# Patient Record
Sex: MALE | Race: WHITE | NOT HISPANIC OR LATINO | Employment: OTHER | URBAN - METROPOLITAN AREA
[De-identification: names, ages, dates, MRNs, and addresses within clinical notes are randomized per-mention and may not be internally consistent; named-entity substitution may affect disease eponyms.]

---

## 2017-01-18 ENCOUNTER — OFFICE VISIT (OUTPATIENT)
Dept: NEUROLOGY | Facility: CLINIC | Age: 42
End: 2017-01-18
Payer: MEDICARE

## 2017-01-18 VITALS
WEIGHT: 234.56 LBS | HEART RATE: 97 BPM | HEIGHT: 72 IN | DIASTOLIC BLOOD PRESSURE: 82 MMHG | SYSTOLIC BLOOD PRESSURE: 142 MMHG | BODY MASS INDEX: 31.77 KG/M2

## 2017-01-18 DIAGNOSIS — F95.2 TOURETTE'S SYNDROME: ICD-10-CM

## 2017-01-18 DIAGNOSIS — F42.2 MIXED OBSESSIONAL THOUGHTS AND ACTS: ICD-10-CM

## 2017-01-18 PROCEDURE — 99213 OFFICE O/P EST LOW 20 MIN: CPT | Mod: PBBFAC | Performed by: PSYCHIATRY & NEUROLOGY

## 2017-01-18 PROCEDURE — 99999 PR PBB SHADOW E&M-EST. PATIENT-LVL III: CPT | Mod: PBBFAC,,, | Performed by: PSYCHIATRY & NEUROLOGY

## 2017-01-18 PROCEDURE — 99204 OFFICE O/P NEW MOD 45 MIN: CPT | Mod: S$GLB,,, | Performed by: PSYCHIATRY & NEUROLOGY

## 2017-01-18 RX ORDER — CHLORDIAZEPOXIDE HYDROCHLORIDE 10 MG/1
10 CAPSULE, GELATIN COATED ORAL 3 TIMES DAILY PRN
Qty: 90 CAPSULE | Refills: 3 | Status: SHIPPED | OUTPATIENT
Start: 2017-01-18 | End: 2017-01-24 | Stop reason: SDUPTHER

## 2017-01-18 RX ORDER — BUPRENORPHINE HYDROCHLORIDE AND NALOXONE HYDROCHLORIDE DIHYDRATE 2; .5 MG/1; MG/1
8 TABLET SUBLINGUAL EVERY 6 HOURS PRN
COMMUNITY

## 2017-01-18 RX ORDER — LEVETIRACETAM 500 MG/1
500 TABLET ORAL 2 TIMES DAILY
COMMUNITY

## 2017-01-18 RX ORDER — TESTOSTERONE CYPIONATE 1000 MG/10ML
200 INJECTION, SOLUTION INTRAMUSCULAR
COMMUNITY

## 2017-01-18 RX ORDER — CLONAZEPAM 1 MG/1
1 TABLET ORAL 2 TIMES DAILY PRN
COMMUNITY

## 2017-01-18 RX ORDER — CLOMIPHENE CITRATE 50 MG/1
50 TABLET ORAL DAILY
COMMUNITY

## 2017-01-18 NOTE — PROGRESS NOTES
Name: Carol Ladd Jr.  MRN: [id number]   CSN: [id number]      Date: 01/18/2017    Referring physician:  Self Referral  No address on file    Chief Complaint / Interval History: Consult      History of Present Illness (HPI):    40 yo male presents for evaluation of tics. Accompanied today by his mother. Onset at 11 yoa, got really bad in 6th grade to the point of cussing. He has OCD and addiction problems. Drinks every morning. He says he found it  EtOH helped tics when he was in his 20s. Went to behavioral modification in 8th grade and was there for three years. Developed program to help his insert more appropriate words or cuss words. He no longer cusses but still has vocal tics. Unfortunately, tics seem to have continued to increase. Started experiencing drugs (marijuana, steroids briefly, pain pills, cocaine occasionally, opiates and alcohol).     He will beat himself from tics. If excited- good or bad, he will have increase tics. His whole body tenses when he has tic. One of his most common tic is when he slams legs (scissor fashion) together and beating punching his upper legs and grabs lower leg muscles and will whole body strain. States it is like he wants to feel pain. He has to feel the certain feeling before he can stop it and then will feel the release but repeats it.     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 Am I ever going to get another girlfriend? Am I ever going to stop this? Am I gong to die?   Is my mom going to die?     Nonmotor/Premotor ROS:  Hyposmia (HENT)?{YES NO:67889}  RBD/sleep issues (Constitutional)?{YES NO:13054}  Depression/anxiety (Psychiatric)?{YES NO:27000}  Fatigue (Constitutional)?{YES NO:11804}  Constipation (GI)?{YES NO:88955}  Urinary issues ()?{YES NO:06853}  Sexual dysfunction ()?{YES NO:24491}  Orthostasis (Cardiovascular)?{YES NO:64296}  Leg swelling (Cardiovascular)? {YES NO:02253}  Falls (Musculoskeletal)?{YES NO:92596}  Cognitive impairment (Neurologic)?{YES  NO:90151}  Psychoses (Psychiatric)?{YES NO:38435}  Pain/Paresthesia (Neurologic)?{YES NO:30749}  Visual changes (Eyes)?{YES NO:03834}  Moles / skin changes (Skin)?{YES NO:38168}  Stridor / SOB (Pulm)?{YES NO:95266}  Bruising (Heme)?{YES NO:18216}    Past Medical History: The patient  has a past medical history of Tourette syndrome.    Social History: The patient  reports that he has never smoked. He does not have any smokeless tobacco history on file.    Family History: Their family history is not on file.    Allergies: Review of patient's allergies indicates no known allergies.     Meds:   Current Outpatient Prescriptions on File Prior to Visit   Medication Sig Dispense Refill    dextroamphetamine-amphetamine 30 mg Tab Take by mouth.      guanfacine (TENEX) 2 MG tablet Take 2 mg by mouth every evening.      [DISCONTINUED] fluoxetine (PROZAC) 40 MG capsule Take 40 mg by mouth once daily.       No current facility-administered medications on file prior to visit.        Exam:  Visit Vitals    BP (!) 142/82    Pulse 97    Ht 6' (1.829 m)    Wt 106.4 kg (234 lb 9.1 oz)    BMI 31.81 kg/m2       Constitutional  Well-developed, well-nourished, appears stated age   Ophthalmoscopic  No papilledema with no hemorrhages or exudates bilaterally   Cardiovascular  Radial pulses 2+ and symmetric, no LE edema bilaterally   Neurological    * Mental status  MOCA =      - Orientation  Oriented to person, place, time, and situation     - Memory   Intact recent and remote     - Attention/concentration  Attentive, vigilant during exam     - Language  Naming & repetition intact, +2-step commands     - Fund of knowledge  Aware of current events     - Executive  Well-organized thoughts     - Other     * Cranial nerves       - CN II  PERRL, visual fields full to confrontation     - CN III, IV, VI  Extraocular movements full, normal pursuits and saccades     - CN V  Sensation V1 - V3 intact     - CN VII  Face strong and symmetric  bilaterally     - CN VIII  Hearing intact bilaterally     - CN IX, X  Palate raises midline and symmetric     - CN XI  SCM and trapezius 5/5 bilaterally     - CN XII  Tongue midline   * Motor  Muscle bulk normal, strength 5/5 throughout   * Sensory   Intact to temperature and vibration throughout   * Coordination  No dysmetria with finger-to-nose or heel-to-shin   * Gait  See below.   * Deep tendon reflexes  2+ and symmetric throughout   Babinski downgoing bilaterally   * Specialized movement exam  No hypophonic speech.    No facial masking.   No cogwheel rigidity.     No bradykinesia.   No tremor with rest, posture, kinesis, or intention.    No other dystonia, chorea, athetosis, myoclonus, or tics.   No motor impersistence.   Normal-based gait.   No shortened stride length.   No abnormal arm swing.     No postural instability.      Laboratory/Radiological:  - Results:No results found for any previous visit.    - Independent review of images:    Diagnoses:          No diagnosis found.    Medical Decision Making:    No Follow-up on file.    I spent *** minutes face-to-face with the patient with >50% of the time spent with counseling and education regarding:  - results of data, diagnosis, and recommendations stated above  - the prognosis of a ***  - risks and benefits of ***  - importance of diet and exercise    Charley Bull, FLOYD, NP-C  Division of Movement and Memory Disorders  [facility]  [phone]

## 2017-01-18 NOTE — MR AVS SNAPSHOT
[facility]  [address]  [facility] [address]  Phone: [phone]  Fax: [phone]                  Carol Ladd Jr.   2017 3:40 PM   Office Visit    Description:  Male : 1975   Provider:  Tarah Hawley MD   Department:  [facility]           Reason for Visit     Consult                To Do List           Goals (5 Years of Data)     None       These Medications        Disp Refills Start End    chlordiazepoxide (LIBRIUM) 10 MG capsule 90 capsule 3 2017     Take 1 capsule (10 mg total) by mouth 3 (three) times daily as needed (tics). - Oral    Pharmacy: [facility] [address] [place] [address]  #: [phone]         [facility] On Call     [facility] On Call Nurse Care Line -  Assistance  Registered nurses in the [facility] On Call Center provide clinical advisement, health education, appointment booking, and other advisory services.  Call for this free service at [phone].             Medications           Message regarding Medications     Verify the changes and/or additions to your medication regime listed below are the same as discussed with your clinician today.  If any of these changes or additions are incorrect, please notify your healthcare provider.        START taking these NEW medications        Refills    chlordiazepoxide (LIBRIUM) 10 MG capsule 3    Sig: Take 1 capsule (10 mg total) by mouth 3 (three) times daily as needed (tics).    Class: Print    Route: Oral      STOP taking these medications     fluoxetine (PROZAC) 40 MG capsule Take 40 mg by mouth once daily.           Verify that the below list of medications is an accurate representation of the medications you are currently taking.  If none reported, the list may be blank. If incorrect, please contact your healthcare provider. Carry this list with you in case of emergency.           Current Medications     buprenorphine-naloxone 2-0.5 mg (SUBOXONE) 2-0.5 mg Subl  Place 8 mg under the tongue every 6 (six) hours as needed.    clomiPHENE (CLOMID) 50 mg tablet Take 50 mg by mouth once daily.    clonazePAM (KLONOPIN) 1 MG tablet Take 1 mg by mouth 2 (two) times daily as needed for Anxiety.    dextroamphetamine-amphetamine 30 mg Tab Take by mouth.    guanfacine (TENEX) 2 MG tablet Take 2 mg by mouth every evening.    levetiracetam (KEPPRA) 500 MG Tab Take 500 mg by mouth 2 (two) times daily.    testosterone cypionate (DEPOTESTOTERONE CYPIONATE) 100 mg/mL injection Inject 200 mg into the muscle every 14 (fourteen) days.    chlordiazepoxide (LIBRIUM) 10 MG capsule Take 1 capsule (10 mg total) by mouth 3 (three) times daily as needed (tics).           Clinical Reference Information           Vital Signs - Last Recorded  Most recent update: 1/18/2017  4:42 PM by Mary Butt MA    BP Pulse Ht Wt BMI    (!) 142/82 97 6' (1.829 m) 106.4 kg (234 lb 9.1 oz) 31.81 kg/m2      Blood Pressure          Most Recent Value    BP  (!)  142/82      Allergies as of 1/18/2017     No Known Allergies      Immunizations Administered on Date of Encounter - 1/18/2017     None      [facility] Sign-Up     Activating your [facility] account is as easy as 1-2-3!     1) Visit [email or web address], select Sign Up Now, enter this activation code and your date of birth, then select Next.  [email or web address]  Expires: 3/4/2017  3:33 PM      2) Create a username and password to use when you visit [facility] in the future and select a security question in case you lose your password and select Next.    3) Enter your e-mail address and click Sign Up!    Additional Information  If you have questions, please e-mail [email or web address] or call [phone] to talk to our [facility] staff. Remember, [facility] is NOT to be used for urgent needs. For medical emergencies, dial 911.

## 2017-01-18 NOTE — PROGRESS NOTES
Carol CLEMENTE Chief Complaints during this visit:  New Patient visit for  tourette's    Self Referral  No address on file    Primary Care Physician  Raymundo Cabrera MD  [address]  [facility] [place] [address]          History of present illness:   40 yo male presents for evaluation of tics. Accompanied today by his mother. Main reason why he sought us out was for possible DBS.    Onset at 11 yoa, got really bad in 6th grade to the point of cussing. He has OCD and addiction problems. Drinks every morning. He says he found it  EtOH helped tics when he was in his 20s. Went to behavioral modification in 8th grade and was there for three years. Developed program to help his insert more appropriate words or cuss words. He no longer cusses but still has vocal tics. Unfortunately, tics seem to have continued to increase. Started experiencing drugs (marijuana, steroids briefly, pain pills, cocaine occasionally, opiates and alcohol).     His tics wax/wane and he has gone months doing well.  H recently broke up with girlfriend and this may have retriggered a flare.  He will beat himself from tics. If excited- good or bad, he will have increase tics. His whole body tenses when he has tic. One of his most common tic is when he slams legs (scissor fashion) together and beating punching his upper legs and grabs lower leg muscles and will whole body strain. States it is like he wants to feel pain. He has to feel the certain feeling before he can stop it and then will feel the release but repeats it.     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 Am I ever going to get another girlfriend? Am I ever going to stop this? Am I gong to die?   Is my mom going to die?     Off all the meds he has tried, etoh is only thing that works.  2 shots of whiskey will calm him down.  Klonopin had some effect, but seems to acclimates.    OCD is severe, washes hands 100 times per day.  Perseverating thoughts.    Lives with mother now, since  "recent break-up.    Was admitted at [facility] in [place] for detox.  During that week stay, he had NO tics.  He had severe OCD, but no tics.  He think it was because of the "detox" pill.    On a program for the suboxone.  Dr. Emi Brown ([place])  Psychiatrist:  Oh doe MD ([facility])      II.  Review of systems  As in HPI,  otherwise, balance 10 systems reviewed and are negative.    III.  Past Medical History   Diagnosis Date    Tourette syndrome      Family History   Problem Relation Age of Onset    OCD Mother     Tics Father     OCD Sister     Tics Paternal Uncle     Tics Cousin     OCD Daughter      Social History     Social History    Marital status: Single     Spouse name: N/A    Number of children: N/A    Years of education: N/A     Social History Main Topics    Smoking status: Never Smoker    Smokeless tobacco: None    Alcohol use None    Drug use: None    Sexual activity: Not Asked     Other Topics Concern    None     Social History Narrative         Current Outpatient Prescriptions on File Prior to Visit   Medication Sig Dispense Refill    dextroamphetamine-amphetamine 30 mg Tab Take by mouth.      guanfacine (TENEX) 2 MG tablet Take 2 mg by mouth every evening.      [DISCONTINUED] fluoxetine (PROZAC) 40 MG capsule Take 40 mg by mouth once daily.       No current facility-administered medications on file prior to visit.        PRIOR problem-specific medications tried:  Haldol (fever/chills); prozac; orap; celexa; pristique; lexapro; effexor; adderall; klonopin; valium; risperdal; ativan;     Review of patient's allergies indicates:  No Known Allergies    IV. Physical Exam    Vitals:    01/18/17 1641   BP: (!) 142/82   Pulse: 97   Weight: 106.4 kg (234 lb 9.1 oz)   Height: 6' (1.829 m)     General appearance: Well nourished, well developed, no acute distress.         Cardiovascular:  pedal pulses 2, no edema or cyanosis, heart regular rate and rhythym, no carotid bruits. "         -------------------------------------------------------------  Facial Expression: normal       Affect: full       Orientation to time & place:  Oriented to time, place, person and situation       Attention & concentration:  Normal attention span and concentration       Memory:  Recent and remote memory intact  Language: Spontaneous, fluent; able to repeat and name objects        Fund of knowledge:  Aware of current events        Speech:  normal (not dysarthric)  -------------------------------------------------------  Cranial nerves: normal visual acuity, visual fields full, optic discs not visualized, pupils equal round and reactive, extraocular movements intact,       facial sensation intact, face symmetrical, hearing intact to whisper, palate raises midline, shoulder shrug strength normal, tongue protrudes midline.        -------------------------------------------------------  Musculoskeletal  Muscle tone: all 4 extremities normal        Muscle Bulk: all 4 extremities normal        Muscle strength:  5/5 in all 4 extremities        --------------------------------------------------------------  Cerebellar and Coordination  Gait:  normal        Finger-nose: no dysmetria       Rapid Alternating Movements (pronation/supination):  R normal; L normal  --------------------------------------------------------------  MOVEMENT DISORDERS FOCUSED EXAM  Abnormality of movement (bradykinesia, hyperkinesia) present? Yes, intermittent large amplitude movements of limbs, persisting and generalized hypertonicity throughout exam.    Tremor present?   No   Posture:  normal  Postural stability:  no Rhomberg    V.  Laboratory/ Radiological Data:   No results found for: ALT, AST, GGT, ALKPHOS, BILITOT   No results found for: TSH            VI. Medical Decision Making  Diagnosis:   Tourette's syndrome                   Assessment:    1.  tourettes with malignant tics        2.  DBS candidate       3.        4.            Treatment plan:  1.  Trial of librium          2.  Obtain outside records from [place]           3.  Did not discuss DBS today fully, but will at next visit.           4.             Tests ordered during this visit:   No orders of the defined types were placed in this encounter.              I appreciate the opportunity to participate in the care of this patient and will communicate my assessment and plan back to the referring physician via copy of this note.                             Return in about 3 months (around 4/18/2017), or TS.

## 2017-01-23 ENCOUNTER — PATIENT MESSAGE (OUTPATIENT)
Dept: NEUROLOGY | Facility: CLINIC | Age: 42
End: 2017-01-23

## 2017-01-24 ENCOUNTER — PATIENT MESSAGE (OUTPATIENT)
Dept: NEUROLOGY | Facility: CLINIC | Age: 42
End: 2017-01-24

## 2017-01-24 PROBLEM — F95.2 TOURETTE'S SYNDROME: Status: ACTIVE | Noted: 2017-01-24

## 2017-01-24 PROBLEM — F42.2 MIXED OBSESSIONAL THOUGHTS AND ACTS: Status: ACTIVE | Noted: 2017-01-24

## 2017-01-24 RX ORDER — CHLORDIAZEPOXIDE HYDROCHLORIDE 10 MG/1
20 CAPSULE, GELATIN COATED ORAL 3 TIMES DAILY PRN
Qty: 90 CAPSULE | Refills: 3
Start: 2017-01-24

## 2017-01-26 ENCOUNTER — PATIENT MESSAGE (OUTPATIENT)
Dept: NEUROLOGY | Facility: CLINIC | Age: 42
End: 2017-01-26

## 2017-01-27 ENCOUNTER — PATIENT MESSAGE (OUTPATIENT)
Dept: NEUROLOGY | Facility: CLINIC | Age: 42
End: 2017-01-27

## 2017-01-28 ENCOUNTER — PATIENT MESSAGE (OUTPATIENT)
Dept: NEUROLOGY | Facility: CLINIC | Age: 42
End: 2017-01-28

## 2017-01-30 ENCOUNTER — PATIENT MESSAGE (OUTPATIENT)
Dept: NEUROLOGY | Facility: CLINIC | Age: 42
End: 2017-01-30

## 2017-01-31 NOTE — TELEPHONE ENCOUNTER
i'm unable to access [facility] now that the encounter is closed, but will fax them a request.    [facility]. THAT FAX NUMBER IS [phone] ATTENTION: MRS CRANE

## 2017-02-01 ENCOUNTER — PATIENT MESSAGE (OUTPATIENT)
Dept: NEUROLOGY | Facility: CLINIC | Age: 42
End: 2017-02-01

## 2017-02-02 ENCOUNTER — PATIENT MESSAGE (OUTPATIENT)
Dept: NEUROLOGY | Facility: CLINIC | Age: 42
End: 2017-02-02

## 2017-02-02 ENCOUNTER — TELEPHONE (OUTPATIENT)
Dept: NEUROLOGY | Facility: CLINIC | Age: 42
End: 2017-02-02

## 2017-02-02 NOTE — TELEPHONE ENCOUNTER
Called yesterday and today and left a message for Julee letting him know that we did get his records from [facility].

## 2017-02-08 ENCOUNTER — PATIENT MESSAGE (OUTPATIENT)
Dept: NEUROLOGY | Facility: CLINIC | Age: 42
End: 2017-02-08

## 2017-02-10 ENCOUNTER — PATIENT MESSAGE (OUTPATIENT)
Dept: NEUROLOGY | Facility: CLINIC | Age: 42
End: 2017-02-10

## 2017-02-10 DIAGNOSIS — F95.2 TOURETTE'S SYNDROME: Primary | ICD-10-CM

## 2017-02-13 ENCOUNTER — PATIENT MESSAGE (OUTPATIENT)
Dept: NEUROLOGY | Facility: CLINIC | Age: 42
End: 2017-02-13

## 2017-02-13 RX ORDER — CLORAZEPATE DIPOTASSIUM 15 MG/1
15 TABLET ORAL 3 TIMES DAILY
Qty: 90 TABLET | Refills: 4 | Status: SHIPPED | OUTPATIENT
Start: 2017-02-13 | End: 2017-02-16 | Stop reason: SDUPTHER

## 2017-02-16 ENCOUNTER — PATIENT MESSAGE (OUTPATIENT)
Dept: NEUROLOGY | Facility: CLINIC | Age: 42
End: 2017-02-16

## 2017-02-16 DIAGNOSIS — F95.2 TOURETTE'S SYNDROME: ICD-10-CM

## 2017-02-17 RX ORDER — CLORAZEPATE DIPOTASSIUM 15 MG/1
15 TABLET ORAL 3 TIMES DAILY
Qty: 90 TABLET | Refills: 4 | Status: SHIPPED | OUTPATIENT
Start: 2017-02-17 | End: 2023-03-21

## 2017-02-18 ENCOUNTER — PATIENT MESSAGE (OUTPATIENT)
Dept: NEUROLOGY | Facility: CLINIC | Age: 42
End: 2017-02-18

## 2017-02-20 ENCOUNTER — PATIENT MESSAGE (OUTPATIENT)
Dept: NEUROLOGY | Facility: CLINIC | Age: 42
End: 2017-02-20

## 2017-02-26 ENCOUNTER — PATIENT MESSAGE (OUTPATIENT)
Dept: NEUROLOGY | Facility: CLINIC | Age: 42
End: 2017-02-26

## 2017-02-27 ENCOUNTER — TELEPHONE (OUTPATIENT)
Dept: NEUROLOGY | Facility: CLINIC | Age: 42
End: 2017-02-27

## 2017-02-27 NOTE — TELEPHONE ENCOUNTER
----- Message from Nicole Eli sent at 2/27/2017  8:33 AM CST -----  Contact: Vaibhav Bonilla-   Need to speak with someone regarding his Dx. She need the actual letter, regarding is Dx. She states she need the letter mailed to her or attached in a letter via e-mail.     She states she may take a full letter with an attachment, with physician signature. Or you may mail it she states.     E-mail: [email or web address]  Phone: [phone] [address] [place]  [address]

## 2017-04-08 ENCOUNTER — PATIENT MESSAGE (OUTPATIENT)
Dept: NEUROLOGY | Facility: CLINIC | Age: 42
End: 2017-04-08

## 2017-04-14 ENCOUNTER — PATIENT MESSAGE (OUTPATIENT)
Dept: NEUROLOGY | Facility: CLINIC | Age: 42
End: 2017-04-14

## 2017-04-18 ENCOUNTER — PATIENT MESSAGE (OUTPATIENT)
Dept: NEUROLOGY | Facility: CLINIC | Age: 42
End: 2017-04-18

## 2020-08-26 ENCOUNTER — TELEPHONE (OUTPATIENT)
Dept: NEUROLOGY | Facility: CLINIC | Age: 45
End: 2020-08-26

## 2020-08-26 NOTE — TELEPHONE ENCOUNTER
----- Message from Efra Amaral sent at 8/26/2020 12:04 PM CDT -----  Contact: Pt @[phone]  Patient calling to schedule a f/u appt, pls call

## 2020-08-31 ENCOUNTER — TELEPHONE (OUTPATIENT)
Dept: NEUROLOGY | Facility: CLINIC | Age: 45
End: 2020-08-31

## 2020-08-31 NOTE — TELEPHONE ENCOUNTER
Called and left a message for  regarding an appt with . I stated that  is out of the office ill and her return is unknown

## 2020-08-31 NOTE — TELEPHONE ENCOUNTER
----- Message from Efra Amaral sent at 8/31/2020 10:55 AM CDT -----  Contact: Pt @[phone]  Patient calling to discuss his options to f/u care ,mandie call

## 2020-08-31 NOTE — TELEPHONE ENCOUNTER
----- Message from Edith Fuentes MA sent at 8/28/2020  4:03 PM CDT -----  Contact: Pt @[phone]    ----- Message -----  From: Efra Amaral  Sent: 8/28/2020  11:05 AM CDT  To: Marleen COLLADO Staff    Patient calling to schedule a f/u appt at the [facility], [facility] call

## 2020-08-31 NOTE — TELEPHONE ENCOUNTER
----- Message from Bebeto Jones sent at 8/31/2020 11:42 AM CDT -----  Contact: pt @ [phone] and portal  Pt calling to schedule w/ movement for tourettes. Last seen by Dr. Hawley in 2017. Pls call and send mssg on portal.

## 2023-03-21 ENCOUNTER — HOSPITAL ENCOUNTER (INPATIENT)
Facility: HOSPITAL | Age: 48
LOS: 20 days | Discharge: HOME | DRG: 896 | End: 2023-04-10
Attending: EMERGENCY MEDICINE | Admitting: INTERNAL MEDICINE
Payer: MEDICARE

## 2023-03-21 DIAGNOSIS — F95.2 TOURETTE SYNDROME: ICD-10-CM

## 2023-03-21 DIAGNOSIS — I21.4 NSTEMI (NON-ST ELEVATED MYOCARDIAL INFARCTION): ICD-10-CM

## 2023-03-21 DIAGNOSIS — G92.9 ENCEPHALOPATHY, TOXIC: ICD-10-CM

## 2023-03-21 DIAGNOSIS — N17.9 ACUTE KIDNEY INJURY: ICD-10-CM

## 2023-03-21 DIAGNOSIS — R79.89 ELEVATED TROPONIN: ICD-10-CM

## 2023-03-21 DIAGNOSIS — R79.89 ELEVATED TROPONIN I LEVEL: ICD-10-CM

## 2023-03-21 DIAGNOSIS — F10.939 ALCOHOL WITHDRAWAL SYNDROME WITH COMPLICATION: Primary | ICD-10-CM

## 2023-03-21 PROBLEM — E87.20 METABOLIC ACIDOSIS: Status: ACTIVE | Noted: 2023-03-21

## 2023-03-21 PROBLEM — M62.82 RHABDOMYOLYSIS: Status: ACTIVE | Noted: 2023-03-21

## 2023-03-21 PROBLEM — R41.82 AMS (ALTERED MENTAL STATUS): Status: ACTIVE | Noted: 2023-03-21

## 2023-03-21 PROBLEM — I10 HTN (HYPERTENSION): Status: ACTIVE | Noted: 2023-03-21

## 2023-03-21 PROBLEM — F32.A DEPRESSION: Status: ACTIVE | Noted: 2023-03-21

## 2023-03-21 LAB
ALBUMIN SERPL BCP-MCNC: 3.9 G/DL (ref 3.5–5)
ALBUMIN/GLOB SERPL: 1.3 {RATIO}
ALP SERPL-CCNC: 60 U/L (ref 45–115)
ALT SERPL W P-5'-P-CCNC: 68 U/L (ref 16–61)
AMMONIA PLAS-SCNC: 29 ΜMOL/L (ref 11–32)
ANION GAP SERPL CALCULATED.3IONS-SCNC: 30 MMOL/L (ref 7–16)
ANISOCYTOSIS BLD QL SMEAR: NORMAL
APTT PPP: 26.3 SECONDS (ref 25.2–37.3)
AST SERPL W P-5'-P-CCNC: 109 U/L (ref 15–37)
BACTERIA #/AREA URNS HPF: ABNORMAL /HPF
BASOPHILS # BLD AUTO: 0.04 K/UL (ref 0–0.2)
BASOPHILS # BLD AUTO: 0.09 K/UL (ref 0–0.2)
BASOPHILS NFR BLD AUTO: 0.4 % (ref 0–1)
BASOPHILS NFR BLD AUTO: 0.6 % (ref 0–1)
BILIRUB SERPL-MCNC: 0.8 MG/DL (ref ?–1.2)
BILIRUB UR QL STRIP: NEGATIVE
BUN SERPL-MCNC: 38 MG/DL (ref 7–18)
BUN/CREAT SERPL: 9 (ref 6–20)
CALCIUM SERPL-MCNC: 8.8 MG/DL (ref 8.5–10.1)
CHLORIDE SERPL-SCNC: 98 MMOL/L (ref 98–107)
CK SERPL-CCNC: 3333 U/L (ref 39–308)
CLARITY UR: CLEAR
CO2 SERPL-SCNC: 18 MMOL/L (ref 21–32)
COLOR UR: YELLOW
CREAT SERPL-MCNC: 4.37 MG/DL (ref 0.7–1.3)
DIFFERENTIAL METHOD BLD: ABNORMAL
DIFFERENTIAL METHOD BLD: ABNORMAL
EGFR (NO RACE VARIABLE) (RUSH/TITUS): 16 ML/MIN/1.73M²
EOSINOPHIL # BLD AUTO: 0.03 K/UL (ref 0–0.5)
EOSINOPHIL # BLD AUTO: 0.1 K/UL (ref 0–0.5)
EOSINOPHIL NFR BLD AUTO: 0.3 % (ref 1–4)
EOSINOPHIL NFR BLD AUTO: 0.6 % (ref 1–4)
ERYTHROCYTE [DISTWIDTH] IN BLOOD BY AUTOMATED COUNT: 15 % (ref 11.5–14.5)
ERYTHROCYTE [DISTWIDTH] IN BLOOD BY AUTOMATED COUNT: 15 % (ref 11.5–14.5)
FINE GRAN CASTS #/AREA URNS LPF: ABNORMAL /LPF
GLOBULIN SER-MCNC: 3.1 G/DL (ref 2–4)
GLUCOSE SERPL-MCNC: 137 MG/DL (ref 74–106)
GLUCOSE UR STRIP-MCNC: 300 MG/DL
HCO3 UR-SCNC: 22.1 MMOL/L (ref 21–28)
HCT VFR BLD AUTO: 29.9 % (ref 40–54)
HCT VFR BLD AUTO: 36.6 % (ref 40–54)
HCT VFR BLD CALC: 33 % (ref 35–51)
HGB BLD-MCNC: 10.2 G/DL (ref 13.5–18)
HGB BLD-MCNC: 12.1 G/DL (ref 13.5–18)
IMM GRANULOCYTES # BLD AUTO: 0.05 K/UL (ref 0–0.04)
IMM GRANULOCYTES # BLD AUTO: 0.09 K/UL (ref 0–0.04)
IMM GRANULOCYTES NFR BLD: 0.5 % (ref 0–0.4)
IMM GRANULOCYTES NFR BLD: 0.6 % (ref 0–0.4)
INR BLD: 1.09
KETONES UR STRIP-SCNC: ABNORMAL MG/DL
LACTATE SERPL-SCNC: 0.8 MMOL/L (ref 0.4–2)
LACTATE SERPL-SCNC: 7.8 MMOL/L (ref 0.4–2)
LDH SERPL L TO P-CCNC: 10 MMOL/L (ref 0.3–1.2)
LEUKOCYTE ESTERASE UR QL STRIP: NEGATIVE
LYMPHOCYTES # BLD AUTO: 0.98 K/UL (ref 1–4.8)
LYMPHOCYTES # BLD AUTO: 2.29 K/UL (ref 1–4.8)
LYMPHOCYTES NFR BLD AUTO: 14.3 % (ref 27–41)
LYMPHOCYTES NFR BLD AUTO: 9.6 % (ref 27–41)
MACROCYTES BLD QL SMEAR: NORMAL
MAGNESIUM SERPL-MCNC: 1.3 MG/DL (ref 1.7–2.3)
MCH RBC QN AUTO: 35.3 PG (ref 27–31)
MCH RBC QN AUTO: 35.3 PG (ref 27–31)
MCHC RBC AUTO-ENTMCNC: 33.1 G/DL (ref 32–36)
MCHC RBC AUTO-ENTMCNC: 34.1 G/DL (ref 32–36)
MCV RBC AUTO: 103.5 FL (ref 80–96)
MCV RBC AUTO: 106.7 FL (ref 80–96)
MONOCYTES # BLD AUTO: 0.68 K/UL (ref 0–0.8)
MONOCYTES # BLD AUTO: 1.04 K/UL (ref 0–0.8)
MONOCYTES NFR BLD AUTO: 6.5 % (ref 2–6)
MONOCYTES NFR BLD AUTO: 6.6 % (ref 2–6)
MPC BLD CALC-MCNC: 9.6 FL (ref 9.4–12.4)
MPC BLD CALC-MCNC: 9.7 FL (ref 9.4–12.4)
MYOGLOBIN SERPL-MCNC: 6355 NG/ML (ref 16–116)
NEUTROPHILS # BLD AUTO: 12.45 K/UL (ref 1.8–7.7)
NEUTROPHILS # BLD AUTO: 8.47 K/UL (ref 1.8–7.7)
NEUTROPHILS NFR BLD AUTO: 77.4 % (ref 53–65)
NEUTROPHILS NFR BLD AUTO: 82.6 % (ref 53–65)
NITRITE UR QL STRIP: NEGATIVE
NRBC # BLD AUTO: 0 X10E3/UL
NRBC # BLD AUTO: 0.02 X10E3/UL
NRBC, AUTO (.00): 0 %
NRBC, AUTO (.00): 0.1 %
PAPPENHEIMER BOD BLD QL SMEAR: NORMAL
PCO2 BLDA: 47 MMHG (ref 35–48)
PH SMN: 7.28 [PH] (ref 7.35–7.45)
PH UR STRIP: 5.5 PH UNITS
PLATELET # BLD AUTO: 140 K/UL (ref 150–400)
PLATELET # BLD AUTO: 97 K/UL (ref 150–400)
PLATELET MORPHOLOGY: NORMAL
PO2 BLDA: 136 MMHG (ref 83–108)
POC BASE EXCESS: -4.6 MMOL/L (ref -2–3)
POC CO2: 23.5 MMOL/L
POC IONIZED CALCIUM: 0.97 MMOL/L (ref 1.15–1.35)
POC SATURATED O2: 99 % (ref 95–98)
POCT GLUCOSE: 106 MG/DL (ref 60–95)
POLYCHROMASIA BLD QL SMEAR: NORMAL
POTASSIUM BLD-SCNC: 3.8 MMOL/L (ref 3.4–4.5)
POTASSIUM SERPL-SCNC: 3.9 MMOL/L (ref 3.5–5.1)
PROT SERPL-MCNC: 7 G/DL (ref 6.4–8.2)
PROT UR QL STRIP: 70
PROTHROMBIN TIME: 13.7 SECONDS (ref 11.7–14.7)
RBC # BLD AUTO: 2.89 M/UL (ref 4.6–6.2)
RBC # BLD AUTO: 3.43 M/UL (ref 4.6–6.2)
RBC # UR STRIP: ABNORMAL /UL
RBC #/AREA URNS HPF: ABNORMAL /HPF
SARS-COV-2 RDRP RESP QL NAA+PROBE: NEGATIVE
SODIUM BLD-SCNC: 137 MMOL/L (ref 136–145)
SODIUM SERPL-SCNC: 142 MMOL/L (ref 136–145)
SP GR UR STRIP: 1.02
SQUAMOUS #/AREA URNS LPF: ABNORMAL /LPF
TROPONIN I SERPL HS-MCNC: 573.5 PG/ML
TROPONIN I SERPL HS-MCNC: 808.8 PG/ML
UROBILINOGEN UR STRIP-ACNC: NORMAL MG/DL
WBC # BLD AUTO: 10.25 K/UL (ref 4.5–11)
WBC # BLD AUTO: 16.06 K/UL (ref 4.5–11)
WBC #/AREA URNS HPF: ABNORMAL /HPF
YEAST #/AREA URNS HPF: ABNORMAL /HPF

## 2023-03-21 PROCEDURE — 99285 EMERGENCY DEPT VISIT HI MDM: CPT | Mod: 25

## 2023-03-21 PROCEDURE — 80061 LIPID PANEL: CPT | Performed by: HOSPITALIST

## 2023-03-21 PROCEDURE — 27000190 HC CPAP FULL FACE MASK W/VALVE

## 2023-03-21 PROCEDURE — 84132 ASSAY OF SERUM POTASSIUM: CPT

## 2023-03-21 PROCEDURE — 27000221 HC OXYGEN, UP TO 24 HOURS

## 2023-03-21 PROCEDURE — 85610 PROTHROMBIN TIME: CPT | Performed by: HOSPITALIST

## 2023-03-21 PROCEDURE — 99223 1ST HOSP IP/OBS HIGH 75: CPT | Mod: ,,, | Performed by: NURSE PRACTITIONER

## 2023-03-21 PROCEDURE — 80074 ACUTE HEPATITIS PANEL: CPT | Performed by: HOSPITALIST

## 2023-03-21 PROCEDURE — 84484 ASSAY OF TROPONIN QUANT: CPT | Performed by: HOSPITALIST

## 2023-03-21 PROCEDURE — 99285 EMERGENCY DEPT VISIT HI MDM: CPT | Mod: CS,,, | Performed by: EMERGENCY MEDICINE

## 2023-03-21 PROCEDURE — 63600175 PHARM REV CODE 636 W HCPCS: Performed by: INTERNAL MEDICINE

## 2023-03-21 PROCEDURE — 20000000 HC ICU ROOM

## 2023-03-21 PROCEDURE — 83874 ASSAY OF MYOGLOBIN: CPT | Performed by: EMERGENCY MEDICINE

## 2023-03-21 PROCEDURE — 83605 ASSAY OF LACTIC ACID: CPT

## 2023-03-21 PROCEDURE — 82550 ASSAY OF CK (CPK): CPT | Performed by: EMERGENCY MEDICINE

## 2023-03-21 PROCEDURE — 96375 TX/PRO/DX INJ NEW DRUG ADDON: CPT

## 2023-03-21 PROCEDURE — 82803 BLOOD GASES ANY COMBINATION: CPT

## 2023-03-21 PROCEDURE — 83735 ASSAY OF MAGNESIUM: CPT | Performed by: HOSPITALIST

## 2023-03-21 PROCEDURE — 25000003 PHARM REV CODE 250: Performed by: NURSE PRACTITIONER

## 2023-03-21 PROCEDURE — 85730 THROMBOPLASTIN TIME PARTIAL: CPT | Performed by: HOSPITALIST

## 2023-03-21 PROCEDURE — 87635 SARS-COV-2 COVID-19 AMP PRB: CPT | Performed by: NURSE PRACTITIONER

## 2023-03-21 PROCEDURE — 94761 N-INVAS EAR/PLS OXIMETRY MLT: CPT

## 2023-03-21 PROCEDURE — 96376 TX/PRO/DX INJ SAME DRUG ADON: CPT

## 2023-03-21 PROCEDURE — 81001 URINALYSIS AUTO W/SCOPE: CPT | Performed by: EMERGENCY MEDICINE

## 2023-03-21 PROCEDURE — 63600175 PHARM REV CODE 636 W HCPCS: Performed by: NURSE PRACTITIONER

## 2023-03-21 PROCEDURE — 96361 HYDRATE IV INFUSION ADD-ON: CPT

## 2023-03-21 PROCEDURE — 80053 COMPREHEN METABOLIC PANEL: CPT | Performed by: EMERGENCY MEDICINE

## 2023-03-21 PROCEDURE — 25000003 PHARM REV CODE 250: Performed by: HOSPITALIST

## 2023-03-21 PROCEDURE — 84295 ASSAY OF SERUM SODIUM: CPT

## 2023-03-21 PROCEDURE — 25000003 PHARM REV CODE 250: Performed by: INTERNAL MEDICINE

## 2023-03-21 PROCEDURE — 25000003 PHARM REV CODE 250

## 2023-03-21 PROCEDURE — 85025 COMPLETE CBC W/AUTO DIFF WBC: CPT | Performed by: HOSPITALIST

## 2023-03-21 PROCEDURE — 87040 BLOOD CULTURE FOR BACTERIA: CPT | Performed by: INTERNAL MEDICINE

## 2023-03-21 PROCEDURE — 85025 COMPLETE CBC W/AUTO DIFF WBC: CPT | Performed by: EMERGENCY MEDICINE

## 2023-03-21 PROCEDURE — 99285 PR EMERGENCY DEPT VISIT,LEVEL V: ICD-10-PCS | Mod: CS,,, | Performed by: EMERGENCY MEDICINE

## 2023-03-21 PROCEDURE — 82140 ASSAY OF AMMONIA: CPT | Performed by: HOSPITALIST

## 2023-03-21 PROCEDURE — 63600175 PHARM REV CODE 636 W HCPCS: Performed by: EMERGENCY MEDICINE

## 2023-03-21 PROCEDURE — 85014 HEMATOCRIT: CPT

## 2023-03-21 PROCEDURE — 82330 ASSAY OF CALCIUM: CPT

## 2023-03-21 PROCEDURE — 83605 ASSAY OF LACTIC ACID: CPT | Performed by: EMERGENCY MEDICINE

## 2023-03-21 PROCEDURE — 84484 ASSAY OF TROPONIN QUANT: CPT | Performed by: NURSE PRACTITIONER

## 2023-03-21 PROCEDURE — 82947 ASSAY GLUCOSE BLOOD QUANT: CPT

## 2023-03-21 PROCEDURE — 99900035 HC TECH TIME PER 15 MIN (STAT)

## 2023-03-21 PROCEDURE — 25000003 PHARM REV CODE 250: Performed by: EMERGENCY MEDICINE

## 2023-03-21 PROCEDURE — 99223 PR INITIAL HOSPITAL CARE,LEVL III: ICD-10-PCS | Mod: ,,, | Performed by: NURSE PRACTITIONER

## 2023-03-21 PROCEDURE — 82746 ASSAY OF FOLIC ACID SERUM: CPT | Performed by: HOSPITALIST

## 2023-03-21 PROCEDURE — 87086 URINE CULTURE/COLONY COUNT: CPT | Performed by: EMERGENCY MEDICINE

## 2023-03-21 PROCEDURE — 63600175 PHARM REV CODE 636 W HCPCS: Performed by: HOSPITALIST

## 2023-03-21 PROCEDURE — 94660 CPAP INITIATION&MGMT: CPT

## 2023-03-21 PROCEDURE — 96365 THER/PROPH/DIAG IV INF INIT: CPT

## 2023-03-21 RX ORDER — HYDROCHLOROTHIAZIDE 25 MG/1
25 TABLET ORAL DAILY
Status: ON HOLD | COMMUNITY
End: 2023-04-07 | Stop reason: HOSPADM

## 2023-03-21 RX ORDER — DOXEPIN HYDROCHLORIDE 25 MG/1
25 CAPSULE ORAL NIGHTLY
COMMUNITY

## 2023-03-21 RX ORDER — ASPIRIN 81 MG/1
81 TABLET ORAL DAILY
Status: DISCONTINUED | OUTPATIENT
Start: 2023-03-21 | End: 2023-04-10 | Stop reason: HOSPADM

## 2023-03-21 RX ORDER — NOREPINEPHRINE BITARTRATE/D5W 4MG/250ML
0-3 PLASTIC BAG, INJECTION (ML) INTRAVENOUS CONTINUOUS
Status: DISCONTINUED | OUTPATIENT
Start: 2023-03-21 | End: 2023-03-23

## 2023-03-21 RX ORDER — LORAZEPAM 2 MG/ML
2 INJECTION INTRAMUSCULAR
Status: DISCONTINUED | OUTPATIENT
Start: 2023-03-21 | End: 2023-04-01

## 2023-03-21 RX ORDER — MULTIVITAMIN
1 TABLET ORAL DAILY
COMMUNITY

## 2023-03-21 RX ORDER — LISINOPRIL 20 MG/1
20 TABLET ORAL DAILY
Status: ON HOLD | COMMUNITY
End: 2023-04-07 | Stop reason: HOSPADM

## 2023-03-21 RX ORDER — SODIUM CHLORIDE 9 MG/ML
INJECTION, SOLUTION INTRAVENOUS
Status: COMPLETED
Start: 2023-03-21 | End: 2023-03-21

## 2023-03-21 RX ORDER — MUPIROCIN 20 MG/G
OINTMENT TOPICAL 2 TIMES DAILY
Status: COMPLETED | OUTPATIENT
Start: 2023-03-21 | End: 2023-03-26

## 2023-03-21 RX ORDER — FLUOXETINE HYDROCHLORIDE 20 MG/1
20 CAPSULE ORAL DAILY
COMMUNITY

## 2023-03-21 RX ORDER — METOPROLOL TARTRATE 1 MG/ML
5 INJECTION, SOLUTION INTRAVENOUS
Status: COMPLETED | OUTPATIENT
Start: 2023-03-21 | End: 2023-03-21

## 2023-03-21 RX ORDER — LOSARTAN POTASSIUM 100 MG/1
100 TABLET ORAL DAILY
Status: ON HOLD | COMMUNITY
End: 2023-04-07 | Stop reason: HOSPADM

## 2023-03-21 RX ORDER — NOREPINEPHRINE BITARTRATE/D5W 4MG/250ML
0-3 PLASTIC BAG, INJECTION (ML) INTRAVENOUS CONTINUOUS
Status: DISCONTINUED | OUTPATIENT
Start: 2023-03-21 | End: 2023-03-21

## 2023-03-21 RX ORDER — PIMOZIDE 1 MG/1
1 TABLET ORAL 2 TIMES DAILY
COMMUNITY

## 2023-03-21 RX ORDER — OMEPRAZOLE 40 MG/1
40 CAPSULE, DELAYED RELEASE ORAL DAILY
COMMUNITY

## 2023-03-21 RX ORDER — HEPARIN SODIUM 5000 [USP'U]/ML
5000 INJECTION, SOLUTION INTRAVENOUS; SUBCUTANEOUS EVERY 8 HOURS
Status: DISCONTINUED | OUTPATIENT
Start: 2023-03-21 | End: 2023-03-21

## 2023-03-21 RX ORDER — DIPHENHYDRAMINE HYDROCHLORIDE 50 MG/ML
50 INJECTION INTRAMUSCULAR; INTRAVENOUS ONCE
Status: COMPLETED | OUTPATIENT
Start: 2023-03-22 | End: 2023-03-21

## 2023-03-21 RX ORDER — LANOLIN ALCOHOL/MO/W.PET/CERES
100 CREAM (GRAM) TOPICAL DAILY
COMMUNITY

## 2023-03-21 RX ORDER — LORAZEPAM 2 MG/ML
2 INJECTION INTRAMUSCULAR
Status: DISCONTINUED | OUTPATIENT
Start: 2023-03-21 | End: 2023-03-21

## 2023-03-21 RX ORDER — DOXEPIN HYDROCHLORIDE 25 MG/1
25 CAPSULE ORAL NIGHTLY
Status: DISCONTINUED | OUTPATIENT
Start: 2023-03-21 | End: 2023-03-24

## 2023-03-21 RX ORDER — HEPARIN SODIUM,PORCINE/D5W 25000/250
0-40 INTRAVENOUS SOLUTION INTRAVENOUS CONTINUOUS
Status: DISCONTINUED | OUTPATIENT
Start: 2023-03-21 | End: 2023-03-24

## 2023-03-21 RX ORDER — LEVETIRACETAM 500 MG/1
500 TABLET ORAL 2 TIMES DAILY
Status: DISCONTINUED | OUTPATIENT
Start: 2023-03-21 | End: 2023-03-29

## 2023-03-21 RX ADMIN — CEFTRIAXONE SODIUM 1 G: 1 INJECTION, POWDER, FOR SOLUTION INTRAMUSCULAR; INTRAVENOUS at 03:03

## 2023-03-21 RX ADMIN — MUPIROCIN: 20 OINTMENT TOPICAL at 08:03

## 2023-03-21 RX ADMIN — METOPROLOL TARTRATE 5 MG: 1 INJECTION, SOLUTION INTRAVENOUS at 01:03

## 2023-03-21 RX ADMIN — LORAZEPAM 2 MG: 2 INJECTION INTRAMUSCULAR; INTRAVENOUS at 11:03

## 2023-03-21 RX ADMIN — LEVETIRACETAM 500 MG: 500 TABLET, FILM COATED ORAL at 08:03

## 2023-03-21 RX ADMIN — LORAZEPAM 2 MG: 2 INJECTION INTRAMUSCULAR; INTRAVENOUS at 01:03

## 2023-03-21 RX ADMIN — SODIUM BICARBONATE: 84 INJECTION, SOLUTION INTRAVENOUS at 02:03

## 2023-03-21 RX ADMIN — LORAZEPAM 2 MG: 2 INJECTION INTRAMUSCULAR; INTRAVENOUS at 12:03

## 2023-03-21 RX ADMIN — LORAZEPAM 2 MG: 2 INJECTION INTRAMUSCULAR; INTRAVENOUS at 09:03

## 2023-03-21 RX ADMIN — HEPARIN SODIUM 12 UNITS/KG/HR: 10000 INJECTION, SOLUTION INTRAVENOUS at 08:03

## 2023-03-21 RX ADMIN — DIPHENHYDRAMINE HYDROCHLORIDE 50 MG: 50 INJECTION, SOLUTION INTRAMUSCULAR; INTRAVENOUS at 11:03

## 2023-03-21 RX ADMIN — DOXEPIN HYDROCHLORIDE 25 MG: 25 CAPSULE ORAL at 09:03

## 2023-03-21 RX ADMIN — NOREPINEPHRINE BITARTRATE 0.02 MCG/KG/MIN: 4 INJECTION, SOLUTION INTRAVENOUS at 02:03

## 2023-03-21 RX ADMIN — ASPIRIN 81 MG: 81 TABLET, DELAYED RELEASE ORAL at 08:03

## 2023-03-21 RX ADMIN — LORAZEPAM 2 MG: 2 INJECTION INTRAMUSCULAR; INTRAVENOUS at 03:03

## 2023-03-21 RX ADMIN — SODIUM BICARBONATE: 84 INJECTION, SOLUTION INTRAVENOUS at 10:03

## 2023-03-21 RX ADMIN — SODIUM CHLORIDE 1000 ML: 9 INJECTION, SOLUTION INTRAVENOUS at 01:03

## 2023-03-21 RX ADMIN — FOLIC ACID: 5 INJECTION, SOLUTION INTRAMUSCULAR; INTRAVENOUS; SUBCUTANEOUS at 01:03

## 2023-03-21 NOTE — HPI
46 y/o male who was transferred to [facility] for increased agitation from [place]. He was admitted there 48 hours ago for ETOH detox. He drinks over 1/5 of alcohol daily and has for several years to help with his tourette's.  Family states that he helps better than medication.  He has also been on Subaxone for several years as well.  His last drink for 03/19. ETOH level was over 400 at [facility].  On arrival to the ER his HR was 160 and he was diaphoretic.  Lactic acid was 10 on ABG and CK 3,333 and Cr 4.38. He was treated with 3 liters NS. He then became hypotensive and was started on Levophed.   On exam he is lethargic due to receiving 6 mg IV ativan but he states that his muscle spasm/contractions are similar to his tics but much worse.  He was started on several psych medications at [facility] but was not taking any at home.     PMH - HTN, ODD, tourette's, opoid abuse and ETOH abuse.

## 2023-03-21 NOTE — SUBJECTIVE & OBJECTIVE
Past Medical History:   Diagnosis Date    Hypertension     Mixed obsessional thoughts and acts 01/24/2017    Tourette syndrome     Tourette's syndrome 01/24/2017       History reviewed. No pertinent surgical history.    Review of patient's allergies indicates:   Allergen Reactions    Haldol [haloperidol lactate]        Family History       Problem Relation (Age of Onset)    OCD Mother, Sister, Daughter    Tics Father, Paternal Uncle, Cousin          Tobacco Use    Smoking status: Never    Smokeless tobacco: Not on file   Substance and Sexual Activity    Alcohol use: Yes     Comment: reports drinking a 5th of alcohol/day    Drug use: Not Currently    Sexual activity: Not Currently         Review of Systems   Unable to perform ROS: Acuity of condition   Objective:     Vital Signs (Most Recent):  Temp: 98.4 °F (36.9 °C) (03/21/23 1226)  Pulse: (!) 127 (03/21/23 1426)  Resp: (!) 23 (03/21/23 1426)  BP: (!) 87/40 (03/21/23 1426)  SpO2: 96 % (03/21/23 1426)   Vital Signs (24h Range):  Temp:  [98.4 °F (36.9 °C)] 98.4 °F (36.9 °C)  Pulse:  [119-159] 127  Resp:  [15-26] 23  SpO2:  [93 %-99 %] 96 %  BP: ()/() 87/40     Weight: 113.4 kg (250 lb)  Body mass index is 33.91 kg/m².      Intake/Output Summary (Last 24 hours) at 3/21/2023 1605  Last data filed at 3/21/2023 1422  Gross per 24 hour   Intake 2000 ml   Output --   Net 2000 ml       Physical Exam  Vitals reviewed.   Constitutional:       Appearance: He is ill-appearing.   HENT:      Right Ear: External ear normal.      Left Ear: External ear normal.      Mouth/Throat:      Mouth: Mucous membranes are dry.      Pharynx: Oropharynx is clear.   Eyes:      Extraocular Movements: Extraocular movements intact.      Conjunctiva/sclera: Conjunctivae normal.   Cardiovascular:      Rate and Rhythm: Tachycardia present.   Pulmonary:      Effort: Pulmonary effort is normal.      Breath sounds: Normal breath sounds.   Abdominal:      General: Bowel sounds are normal.       Palpations: Abdomen is soft.      Tenderness: There is no abdominal tenderness.   Musculoskeletal:      Cervical back: Normal range of motion and neck supple.   Skin:     General: Skin is warm and dry.      Capillary Refill: Capillary refill takes less than 2 seconds.   Neurological:      Mental Status: He is lethargic.       Vents:       Lines/Drains/Airways       Peripheral Intravenous Line  Duration                  Peripheral IV - Single Lumen 03/21/23 1203 18 G Distal;Posterior;Right Forearm <1 day         Peripheral IV - Single Lumen 03/21/23 1206 Posterior;Right Hand <1 day                    Significant Labs:    CBC/Anemia Profile:  Recent Labs   Lab 03/21/23  1237 03/21/23  1345   WBC 16.06*  --    HGB 12.1*  --    HCT 36.6* 33*   *  --    .7*  --    RDW 15.0*  --         Chemistries:  Recent Labs   Lab 03/21/23  1237      K 3.9   CL 98   CO2 18*   BUN 38*   CREATININE 4.37*   CALCIUM 8.8   ALBUMIN 3.9   PROT 7.0   BILITOT 0.8   ALKPHOS 60   ALT 68*   *       All pertinent labs within the past 24 hours have been reviewed.    Significant Imaging:   I have reviewed all pertinent imaging results/findings within the past 24 hours.

## 2023-03-21 NOTE — ED PROVIDER NOTES
Encounter Date: 3/21/2023    SCRIBE #1 NOTE: I, Esha Bangura, am scribing for, and in the presence of,  David Ram MD. I have scribed the entire note.     History     Chief Complaint   Patient presents with    Tremors    Tachycardia     This is a 48 y/o white male,who presents to the ED via EMS for evaluation. He was sent from [place] due to alcohol withdrawals. He states he normally drinks a 5th day and his last drink was yesterday before entering [place]. EMS states [place] staff told them the pt was diaphoretic. He was having tremors and was tachycardiac as well. He denies any CP or SOB while in the ED. There are no other complaints/pain in the ED at this time. Pt has a known Hx of Tourette's syndrome and takes Klonopin      The history is provided by the EMS personnel and the patient. No  was used.   Review of patient's allergies indicates:   Allergen Reactions    Haldol [haloperidol lactate]      Past Medical History:   Diagnosis Date    Hypertension     Mixed obsessional thoughts and acts 01/24/2017    Tourette syndrome     Tourette's syndrome 01/24/2017     History reviewed. No pertinent surgical history.  Family History   Problem Relation Age of Onset    OCD Mother     Tics Father     OCD Sister     Tics Paternal Uncle     Tics Cousin     OCD Daughter      Social History     Tobacco Use    Smoking status: Never   Substance Use Topics    Alcohol use: Yes     Comment: reports drinking a 5th of alcohol/day    Drug use: Not Currently     Review of Systems   Constitutional:  Positive for diaphoresis.   Respiratory:  Negative for shortness of breath.    Cardiovascular:  Negative for chest pain.        Heart racing.    Neurological:  Positive for tremors.   All other systems reviewed and are negative.    Physical Exam     Initial Vitals   BP Pulse Resp Temp SpO2   03/21/23 1226 03/21/23 1226 03/21/23 1226 03/21/23 1226 03/21/23 1227   (!) 99/28 (!) 153 19 98.4 °F (36.9 °C) 96 %       MAP       --                Physical Exam    Nursing note and vitals reviewed.  Constitutional: He appears well-developed and well-nourished.   HENT:   Head: Normocephalic and atraumatic.   Eyes: Conjunctivae and EOM are normal. Pupils are equal, round, and reactive to light.   Neck: Neck supple.   Normal range of motion.  Cardiovascular:  Regular rhythm, normal heart sounds and intact distal pulses.           Tachycardia.    Pulmonary/Chest: Breath sounds normal.   Abdominal: Abdomen is soft. Bowel sounds are normal.   Musculoskeletal:         General: Normal range of motion.      Cervical back: Normal range of motion and neck supple.     Neurological: He is alert and oriented to person, place, and time. He has normal strength.   Skin: Skin is warm and dry. Capillary refill takes less than 2 seconds.   Pt appears flushed.    Psychiatric: He has a normal mood and affect. Thought content normal.       ED Course   Procedures  Labs Reviewed   COMPREHENSIVE METABOLIC PANEL - Abnormal; Notable for the following components:       Result Value    CO2 18 (*)     Anion Gap 30 (*)     Glucose 137 (*)     BUN 38 (*)     Creatinine 4.37 (*)     ALT 68 (*)      (*)     eGFR 16 (*)     All other components within normal limits   URINALYSIS, REFLEX TO URINE CULTURE - Abnormal; Notable for the following components:    Protein, UA 70 (*)     Glucose,  (*)     Blood, UA Large (*)     All other components within normal limits   CK - Abnormal; Notable for the following components:    CK 3,333 (*)     All other components within normal limits   MYOGLOBIN, SERUM - Abnormal; Notable for the following components:    Myoglobin 6,355 (*)     All other components within normal limits   CBC WITH DIFFERENTIAL - Abnormal; Notable for the following components:    WBC 16.06 (*)     RBC 3.43 (*)     Hemoglobin 12.1 (*)     Hematocrit 36.6 (*)     .7 (*)     MCH 35.3 (*)     RDW 15.0 (*)     Platelet Count 140 (*)      Neutrophils % 77.4 (*)     Lymphocytes % 14.3 (*)     Monocytes % 6.5 (*)     Eosinophils % 0.6 (*)     Immature Granulocytes % 0.6 (*)     nRBC, Auto 0.1 (*)     Neutrophils, Abs 12.45 (*)     Monocytes, Absolute 1.04 (*)     Immature Granulocytes, Absolute 0.09 (*)     nRBC, Absolute 0.02 (*)     All other components within normal limits   LACTIC ACID, PLASMA - Abnormal; Notable for the following components:    Lactic Acid 7.8 (*)     All other components within normal limits   URINALYSIS, MICROSCOPIC - Abnormal; Notable for the following components:    Bacteria, UA Moderate (*)     Fine Granular Casts, UA 0-2 (*)     All other components within normal limits   CBC W/ AUTO DIFFERENTIAL    Narrative:     The following orders were created for panel order CBC auto differential.  Procedure                               Abnormality         Status                     ---------                               -----------         ------                     CBC with Differential[[id number]]        Abnormal            Final result                 Please view results for these tests on the individual orders.   CBC MORPHOLOGY        ECG Results    None       Imaging Results              X-Ray Chest 1 View (Final result)  Result time 03/21/23 19:56:52      Final result by Emmanuel Scott MD (03/21/23 19:56:52)                   Impression:      No acute cardiopulmonary process.  Probable minimal right basilar atelectatic changes as detailed above.    Place of service: [facility]      Electronically signed by: Emmanuel Scott  Date:    03/21/2023  Time:    19:56               Narrative:    EXAMINATION:  XR CHEST 1 VIEW    CLINICAL HISTORY:  sirs;    COMPARISON:  None available    FINDINGS:  The cardiomediastinal silhouette is within normal limits. Lungs are predominantly clear.  There is moderate elevation of the right hemidiaphragm with minimal right basilar opacities suggestive of passive atelectatic changes.   There is no pneumothorax or pleural effusion.    There is no acute osseous or soft tissue abnormality.                                       CT Head Without Contrast (Final result)  Result time 03/21/23 15:13:32      Final result by Konstantin Roman DO (03/21/23 15:13:32)                   Impression:      No convincing imaging evidence of acute intracranial abnormality.    The CT exam was performed using one or more of the following dose    reduction techniques- Automated exposure control, adjustment of the mA    and/or kV according to patient size, and/or use of iterative    reconstructed technique.    Point of Service: [facility]      Electronically signed by: Konstantin Roman  Date:    03/21/2023  Time:    15:13               Narrative:    EXAMINATION:  CT HEAD WITHOUT CONTRAST    CLINICAL HISTORY:  Mental status change, unknown cause;    COMPARISON:  None    TECHNIQUE:  Multiple axial tomographic images of the brain were obtained without the use of intravenous contrast.    FINDINGS:  Midline structures are nondisplaced.  No convincing evidence of acute intracranial hemorrhage.  No convincing evidence of hydrocephalus.  Visualized paranasal sinuses and mastoid air cells are predominantly clear.                                       Medications   etomidate (AMIDATE) 2 mg/mL injection (has no administration in time range)   rocuronium 10 mg/mL injection (has no administration in time range)   0.9%  NaCl infusion ( Intravenous New Bag 4/6/23 0528)   sodium chloride 0.9% 1,000 mL with mvi, (ADULT) no.4 with vit K 3,300 unit- 150 mcg/10 mL 10 mL, thiamine 100 mg, folic acid 1 mg infusion ( Intravenous Rate/Dose Change 3/21/23 1422)   metoprolol injection 5 mg (5 mg Intravenous Given 3/21/23 1311)   sodium chloride 0.9% bolus 1,000 mL 1,000 mL (0 mLs Intravenous Stopped 3/21/23 1419)   sodium chloride 0.9% bolus 1,000 mL 1,000 mL (0 mLs Intravenous Stopped 3/21/23 1422)   sodium chloride 0.9% 1,000 mL with  mvi, (ADULT) no.4 with vit K 3,300 unit- 150 mcg/10 mL 10 mL, thiamine 100 mg, folic acid 1 mg infusion ( Intravenous Stopped 3/24/23 1700)   mupirocin 2 % ointment ( Nasal Given 3/26/23 0929)   heparin 25,000 units in dextrose 5% (100 units/ml) IV bolus from bag INITIAL BOLUS (max bolus 4000 units) (4,000 Units Intravenous Bolus from Bag 3/21/23 2040)   diphenhydrAMINE injection 50 mg (50 mg Intravenous Given 3/21/23 2344)   perflutren lipid microspheres injection 1.5 mL (1.5 mLs Intravenous Given 3/22/23 0828)   LORazepam injection 2 mg (2 mg Intravenous Given 3/22/23 1209)   LORazepam injection 2 mg (2 mg Intravenous Given 3/22/23 1517)   LORazepam injection 2 mg (2 mg Intravenous Given 3/22/23 1915)   etomidate injection (20 mg Intravenous Given 3/22/23 2357)   rocuronium injection (100 mg Intravenous Given 3/22/23 2357)   piperacillin-tazobactam (ZOSYN) 4.5 g in dextrose 5 % in water (D5W) 5 % 100 mL IVPB (MB+) (0 g Intravenous Stopped 4/2/23 1957)   furosemide injection 40 mg (40 mg Intravenous Given 3/28/23 2100)   ziprasidone injection 20 mg (20 mg Intramuscular Given 3/28/23 1910)   ziprasidone injection 20 mg (20 mg Intramuscular Given 3/29/23 0405)   furosemide injection 80 mg (80 mg Intravenous Given 3/29/23 0612)   LORazepam injection 3 mg (3 mg Intravenous Given 3/29/23 0810)   OLANZapine injection 10 mg (10 mg Intramuscular Given 3/29/23 0932)   morphine injection 2 mg (2 mg Intravenous Given 3/29/23 1455)   potassium chloride 10 mEq in 100 mL IVPB (0 mEq Intravenous Stopped 3/30/23 1354)   furosemide injection 80 mg (80 mg Intravenous Given 3/31/23 0654)   potassium bicarbonate disintegrating tablet 25 mEq (25 mEq Oral Given 4/1/23 0838)   aluminum-magnesium hydroxide-simethicone 200-200-20 mg/5 mL suspension 30 mL (30 mLs Oral Given 4/3/23 0408)     And   LIDOcaine HCl 2% oral solution 15 mL (15 mLs Oral Given 4/3/23 0407)   potassium bicarbonate disintegrating tablet 20 mEq (20 mEq Oral Given  4/3/23 1326)   loperamide capsule 2 mg (2 mg Oral Given 4/5/23 2102)   potassium chloride SA CR tablet 40 mEq (40 mEq Oral Given 4/6/23 0852)   influenza (QUADRIVALENT PF) vaccine 0.5 mL (0.5 mLs Intramuscular Given 4/7/23 1743)   ALPRAZolam tablet 0.5 mg (0.5 mg Oral Given 4/7/23 1700)   morphine injection 8 mg (8 mg Intramuscular Given by Other 4/9/23 1924)   diphenhydrAMINE injection 50 mg (50 mg Intravenous Given by Other 4/9/23 1923)     Medical Decision Making:   Initial Assessment:   SENT FROM REHAB CENTER WHERE PATIENT WAS ADMITTED FOR ALCOHOL DEPENDENCE.    Differential Diagnosis:   DDX:  ALCOHOL WITHDRAWAL +/- EXACERBATION OF TOURETTE'S VS OTHER WITHDRAWAL VS METABOLIC ILLNESS  Clinical Tests:   Lab Tests: Ordered and Reviewed  Radiological Study: Ordered and Reviewed  Medical Tests: Ordered and Reviewed  ED Management:  DX:  ALCOHOL WITHDRAWAL SYNDROME + TOURETTE'S EXACERBATION AS A RESULT.  ADMIT.  DETOX.    Other:   I have discussed this case with another health care provider.       <> Summary of the Discussion: 1344: Dr. Ram calls and speaks with Dr. Givens, the on call hospitalist, to discuss the pt's case and possible admissions. Dr. Givens would like for Dr. Navarro to asset the pt.           Attending Attestation:           Physician Attestation for Scribe:  Physician Attestation Statement for Scribe #1: I, David Ram MD, reviewed documentation, as scribed by Esha Bangura in my presence, and it is both accurate and complete.                        Clinical Impression:   Final diagnoses:  [F10.939] Alcohol withdrawal syndrome with complication (Primary)  [N17.9] Acute kidney injury  [F95.2] Tourette syndrome  [I21.4] NSTEMI (non-ST elevated myocardial infarction)  [R77.8] Elevated troponin I level        ED Disposition Condition    Admit Stable                David Ram MD  05/05/23 7125

## 2023-03-21 NOTE — ASSESSMENT & PLAN NOTE
Now hypotensive, started on Levophed after 3 liter NS   - continue to wean as tolerated   - hold all HTN medications

## 2023-03-21 NOTE — ASSESSMENT & PLAN NOTE
Prozac and Doxepin on home medication list but states he was not taking these..   Was also started on those medications along  with tranxene, trazodone and Zyprexa at [place] over the last 2 days  -- Patient is likely starting to have withdraws from alcohol and subaxone; however, the combination of prozac and doxepin and trazadone all increase the risk of serotonin syndrome .. given this concern serotonin syndrome should be in the differential as well     - will continue IVF hydration, hold SSRIs for now and continue benzos - will discuss further with Dr. Navarro

## 2023-03-21 NOTE — ASSESSMENT & PLAN NOTE
Baseline Cr 1.23... Cr 2.81 on 03/15   - likely from rhabdomyolysis  -  Will continue to hydrate and repeat lab in AM   - hold ace/HCTZ

## 2023-03-21 NOTE — ASSESSMENT & PLAN NOTE
CK 3,333   ? Pt has severe muscle contractions/spasms - states it is a little worse than his regular tics   Continue IVF hydration - trend labs

## 2023-03-21 NOTE — ED TRIAGE NOTES
Presents to ED via EMS from [place] for staff c/o tachycardia, tremor and diaphoresis that began today. Patient admitted to [place] last night for alcohol detox, normally drinks a 5th of alcohol daily, last drink last night.

## 2023-03-21 NOTE — PLAN OF CARE
Problem: Infection  Goal: Absence of Infection Signs and Symptoms  Outcome: Ongoing, Progressing     Problem: Adult Inpatient Plan of Care  Goal: Plan of Care Review  Outcome: Ongoing, Progressing  Goal: Patient-Specific Goal (Individualized)  Outcome: Ongoing, Progressing  Goal: Absence of Hospital-Acquired Illness or Injury  Outcome: Ongoing, Progressing  Goal: Optimal Comfort and Wellbeing  Outcome: Ongoing, Progressing  Goal: Readiness for Transition of Care  Outcome: Ongoing, Progressing     Problem: Fluid and Electrolyte Imbalance (Acute Kidney Injury/Impairment)  Goal: Fluid and Electrolyte Balance  Outcome: Ongoing, Progressing     Problem: Oral Intake Inadequate (Acute Kidney Injury/Impairment)  Goal: Optimal Nutrition Intake  Outcome: Ongoing, Progressing     Problem: Renal Function Impairment (Acute Kidney Injury/Impairment)  Goal: Effective Renal Function  Outcome: Ongoing, Progressing     Problem: Skin Injury Risk Increased  Goal: Skin Health and Integrity  Outcome: Ongoing, Progressing

## 2023-03-21 NOTE — ASSESSMENT & PLAN NOTE
Long history of ETOH use  - pt tachycardia, diaphoretic and agitated on exam    At [place] since for detox since 3/19  -- last drink 48 hours ago   [facility]

## 2023-03-21 NOTE — H&P
[facility] - Emergency Department  Pulmonology  H&P    Patient Name: Carol Ladd Jr.  MRN: [id number]  Admission Date: 3/21/2023  Code Status: No Order  Primary Care Provider: Raymundo Cabrera MD   Principal Problem: Alcohol withdrawal syndrome with complication    Subjective:     HPI:  46 y/o male who was transferred to [facility] ER for increased agitation from [place]. He was admitted there 48 hours ago for ETOH detox. He drinks over 1/5 of alcohol daily and has for several years to help with his tourette's.  Family states that he helps better than medication.  He has also been on Subaxone for several years as well.  His last drink for 03/19. ETOH level was over 400 at [place].  On arrival to the ER his HR was 160 and he was diaphoretic.  Lactic acid was 10 on ABG and CK 3,333 and Cr 4.38. He was treated with 3 liters NS. He then became hypotensive and was started on Levophed.   On exam he is lethargic due to receiving 6 mg IV ativan but he states that his muscle spasm/contractions are similar to his tics but much worse.  He was started on several psych medications at [place] but was not taking any at home.     PMH - HTN, ODD, tourette's, opoid abuse and ETOH abuse.         Past Medical History:   Diagnosis Date    Hypertension     Mixed obsessional thoughts and acts 01/24/2017    Tourette syndrome     Tourette's syndrome 01/24/2017       History reviewed. No pertinent surgical history.    Review of patient's allergies indicates:   Allergen Reactions    Haldol [haloperidol lactate]        Family History       Problem Relation (Age of Onset)    OCD Mother, Sister, Daughter    Tics Father, Paternal Uncle, Cousin          Tobacco Use    Smoking status: Never    Smokeless tobacco: Not on file   Substance and Sexual Activity    Alcohol use: Yes     Comment: reports drinking a 5th of alcohol/day    Drug use: Not Currently    Sexual activity: Not Currently         Review of Systems   Unable  to perform ROS: Acuity of condition   Objective:     Vital Signs (Most Recent):  Temp: 98.4 °F (36.9 °C) (03/21/23 1226)  Pulse: (!) 127 (03/21/23 1426)  Resp: (!) 23 (03/21/23 1426)  BP: (!) 87/40 (03/21/23 1426)  SpO2: 96 % (03/21/23 1426)   Vital Signs (24h Range):  Temp:  [98.4 °F (36.9 °C)] 98.4 °F (36.9 °C)  Pulse:  [119-159] 127  Resp:  [15-26] 23  SpO2:  [93 %-99 %] 96 %  BP: ()/() 87/40     Weight: 113.4 kg (250 lb)  Body mass index is 33.91 kg/m².      Intake/Output Summary (Last 24 hours) at 3/21/2023 1605  Last data filed at 3/21/2023 1422  Gross per 24 hour   Intake 2000 ml   Output --   Net 2000 ml       Physical Exam  Vitals reviewed.   Constitutional:       Appearance: He is ill-appearing.   HENT:      Right Ear: External ear normal.      Left Ear: External ear normal.      Mouth/Throat:      Mouth: Mucous membranes are dry.      Pharynx: Oropharynx is clear.   Eyes:      Extraocular Movements: Extraocular movements intact.      Conjunctiva/sclera: Conjunctivae normal.   Cardiovascular:      Rate and Rhythm: Tachycardia present.   Pulmonary:      Effort: Pulmonary effort is normal.      Breath sounds: Normal breath sounds.   Abdominal:      General: Bowel sounds are normal.      Palpations: Abdomen is soft.      Tenderness: There is no abdominal tenderness.   Musculoskeletal:      Cervical back: Normal range of motion and neck supple.   Skin:     General: Skin is warm and dry.      Capillary Refill: Capillary refill takes less than 2 seconds.   Neurological:      Mental Status: He is lethargic.       Vents:       Lines/Drains/Airways       Peripheral Intravenous Line  Duration                  Peripheral IV - Single Lumen 03/21/23 1203 18 G Distal;Posterior;Right Forearm <1 day         Peripheral IV - Single Lumen 03/21/23 1206 Posterior;Right Hand <1 day                    Significant Labs:    CBC/Anemia Profile:  Recent Labs   Lab 03/21/23  1237 03/21/23  1345   WBC 16.06*  --    HGB  12.1*  --    HCT 36.6* 33*   *  --    .7*  --    RDW 15.0*  --         Chemistries:  Recent Labs   Lab 03/21/23  1237      K 3.9   CL 98   CO2 18*   BUN 38*   CREATININE 4.37*   CALCIUM 8.8   ALBUMIN 3.9   PROT 7.0   BILITOT 0.8   ALKPHOS 60   ALT 68*   *       All pertinent labs within the past 24 hours have been reviewed.    Significant Imaging:   I have reviewed all pertinent imaging results/findings within the past 24 hours.    Assessment/Plan:     Neuro  AMS (altered mental status)  Lethargic - treated with  6 mg IV ativan in ER -- CT head negative   - neurochecks     Tourette syndrome  States this is the reason for his drinking - to help control his Tics    Psychiatric  * Alcohol withdrawal syndrome with complication  Long history of ETOH use  - pt tachycardia, diaphoretic and agitated on exam    At [place] since for detox since 3/19  -- last drink 48 hours ago   -- prn ativan for DTs     Depression  Prozac and Doxepin on home medication list but states he was not taking these..   Was also started on those medications along  with tranxene, trazodone and Zyprexa at [place] over the last 2 days  -- Patient is likely starting to have withdraws from alcohol and subaxone; however, the combination of prozac and doxepin and trazadone all increase the risk of serotonin syndrome .. given this concern serotonin syndrome should be in the differential as well     - will continue IVF hydration, hold SSRIs for now and continue benzos - will discuss further with Dr. Navarro          Cardiac/Vascular  HTN (hypertension)  Now hypotensive, started on Levophed after 3 liter NS   - continue to wean as tolerated   - hold all HTN medications     Renal/  Metabolic acidosis  Lactic acid 10- reflex 7.8   - repeat pending   - hydrate - BC x 2 , chest xray, urine culture   - start rocephin and follow cultures     Acute kidney injury  Baseline Cr 1.23... Cr 2.81 on 03/15   - likely from rhabdomyolysis  -   Will continue to hydrate and repeat lab in AM   - hold ace/HCTZ       Orthopedic  Rhabdomyolysis  CK 3,333   ? Pt has severe muscle contractions/spasms - states it is a little worse than his regular tics   Continue IVF hydration - trend labs              Manuela Bhatia, AG-ACNP  Pulmonology  [facility] - Emergency Department

## 2023-03-22 PROBLEM — G47.33 OBSTRUCTIVE SLEEP APNEA: Status: ACTIVE | Noted: 2023-03-22

## 2023-03-22 LAB
ALBUMIN SERPL BCP-MCNC: 3.4 G/DL (ref 3.5–5)
ALBUMIN/GLOB SERPL: 1.1 {RATIO}
ALP SERPL-CCNC: 49 U/L (ref 45–115)
ALT SERPL W P-5'-P-CCNC: 66 U/L (ref 16–61)
ANION GAP SERPL CALCULATED.3IONS-SCNC: 12 MMOL/L (ref 7–16)
AORTIC ROOT ANNULUS: 2.6 CM
AORTIC VALVE CUSP SEPERATION: 1.84 CM
APTT PPP: 39.9 SECONDS (ref 25.2–37.3)
APTT PPP: 45.8 SECONDS (ref 25.2–37.3)
APTT PPP: 52.7 SECONDS (ref 25.2–37.3)
AST SERPL W P-5'-P-CCNC: 182 U/L (ref 15–37)
AV INDEX (PROSTH): 1
AV MEAN GRADIENT: 6 MMHG
AV PEAK GRADIENT: 10 MMHG
AV VALVE AREA: 2.54 CM2
AV VELOCITY RATIO: 0.81
BASOPHILS # BLD AUTO: 0.04 K/UL (ref 0–0.2)
BASOPHILS NFR BLD AUTO: 0.4 % (ref 0–1)
BILIRUB SERPL-MCNC: 1 MG/DL (ref ?–1.2)
BSA FOR ECHO PROCEDURE: 2.41 M2
BUN SERPL-MCNC: 38 MG/DL (ref 7–18)
BUN/CREAT SERPL: 12 (ref 6–20)
CALCIUM SERPL-MCNC: 7.9 MG/DL (ref 8.5–10.1)
CHLORIDE SERPL-SCNC: 101 MMOL/L (ref 98–107)
CHOLEST SERPL-MCNC: 188 MG/DL (ref 0–200)
CHOLEST/HDLC SERPL: 3.9 {RATIO}
CK SERPL-CCNC: 9946 U/L (ref 39–308)
CO2 SERPL-SCNC: 35 MMOL/L (ref 21–32)
CREAT SERPL-MCNC: 3.24 MG/DL (ref 0.7–1.3)
CV ECHO LV RWT: 0.47 CM
DIFFERENTIAL METHOD BLD: ABNORMAL
DOP CALC AO PEAK VEL: 1.6 M/S
DOP CALC AO VTI: 19 CM
DOP CALC LVOT AREA: 2.5 CM2
DOP CALC LVOT DIAMETER: 1.8 CM
DOP CALC LVOT PEAK VEL: 1.3 M/S
DOP CALC LVOT STROKE VOLUME: 48.32 CM3
DOP CALCLVOT PEAK VEL VTI: 19 CM
E WAVE DECELERATION TIME: 121 MSEC
ECHO EF ESTIMATED: 65 %
ECHO LV POSTERIOR WALL: 1.08 CM (ref 0.6–1.1)
EGFR (NO RACE VARIABLE) (RUSH/TITUS): 23 ML/MIN/1.73M²
EJECTION FRACTION: 75 %
EOSINOPHIL # BLD AUTO: 0.08 K/UL (ref 0–0.5)
EOSINOPHIL NFR BLD AUTO: 0.7 % (ref 1–4)
ERYTHROCYTE [DISTWIDTH] IN BLOOD BY AUTOMATED COUNT: 15 % (ref 11.5–14.5)
FOLATE SERPL-MCNC: >20 NG/ML (ref 3.1–17.5)
FRACTIONAL SHORTENING: 41 % (ref 28–44)
GLOBULIN SER-MCNC: 3.1 G/DL (ref 2–4)
GLUCOSE SERPL-MCNC: 104 MG/DL (ref 74–106)
HAV IGM SER QL: NORMAL
HBV CORE IGM SER QL: NORMAL
HBV SURFACE AG SERPL QL IA: NORMAL
HCT VFR BLD AUTO: 30.1 % (ref 40–54)
HCV AB SER QL: NORMAL
HDLC SERPL-MCNC: 48 MG/DL (ref 40–60)
HGB BLD-MCNC: 10.2 G/DL (ref 13.5–18)
IMM GRANULOCYTES # BLD AUTO: 0.05 K/UL (ref 0–0.04)
IMM GRANULOCYTES NFR BLD: 0.4 % (ref 0–0.4)
INTERVENTRICULAR SEPTUM: 1.07 CM (ref 0.6–1.1)
IVC OSTIUM: 1.8 CM
LACTATE SERPL-SCNC: 1.3 MMOL/L (ref 0.4–2)
LACTATE SERPL-SCNC: 2.8 MMOL/L (ref 0.4–2)
LACTATE SERPL-SCNC: 3 MMOL/L (ref 0.4–2)
LDLC SERPL CALC-MCNC: 111 MG/DL
LDLC/HDLC SERPL: 2.3 {RATIO}
LEFT ATRIUM SIZE: 3.3 CM
LEFT INTERNAL DIMENSION IN SYSTOLE: 2.73 CM (ref 2.1–4)
LEFT VENTRICLE DIASTOLIC VOLUME INDEX: 42.26 ML/M2
LEFT VENTRICLE DIASTOLIC VOLUME: 99.3 ML
LEFT VENTRICLE MASS INDEX: 76 G/M2
LEFT VENTRICLE SYSTOLIC VOLUME INDEX: 11.8 ML/M2
LEFT VENTRICLE SYSTOLIC VOLUME: 27.8 ML
LEFT VENTRICULAR INTERNAL DIMENSION IN DIASTOLE: 4.64 CM (ref 3.5–6)
LEFT VENTRICULAR MASS: 177.94 G
LVOT MG: 4 MMHG
LYMPHOCYTES # BLD AUTO: 1.34 K/UL (ref 1–4.8)
LYMPHOCYTES NFR BLD AUTO: 12.1 % (ref 27–41)
MCH RBC QN AUTO: 35.1 PG (ref 27–31)
MCHC RBC AUTO-ENTMCNC: 33.9 G/DL (ref 32–36)
MCV RBC AUTO: 103.4 FL (ref 80–96)
MONOCYTES # BLD AUTO: 0.81 K/UL (ref 0–0.8)
MONOCYTES NFR BLD AUTO: 7.3 % (ref 2–6)
MPC BLD CALC-MCNC: 9.6 FL (ref 9.4–12.4)
MV PEAK E VEL: 0.93 M/S
NEUTROPHILS # BLD AUTO: 8.8 K/UL (ref 1.8–7.7)
NEUTROPHILS NFR BLD AUTO: 79.1 % (ref 53–65)
NONHDLC SERPL-MCNC: 140 MG/DL
NRBC # BLD AUTO: 0 X10E3/UL
NRBC, AUTO (.00): 0 %
PISA TR MAX VEL: 2.8 M/S
PLATELET # BLD AUTO: 94 K/UL (ref 150–400)
POTASSIUM SERPL-SCNC: 3.8 MMOL/L (ref 3.5–5.1)
PROT SERPL-MCNC: 6.5 G/DL (ref 6.4–8.2)
RA MAJOR: 4.1 CM
RA PRESSURE: 15 MMHG
RBC # BLD AUTO: 2.91 M/UL (ref 4.6–6.2)
RIGHT VENTRICULAR END-DIASTOLIC DIMENSION: 4.1 CM
SODIUM SERPL-SCNC: 144 MMOL/L (ref 136–145)
TR MAX PG: 31 MMHG
TRICUSPID ANNULAR PLANE SYSTOLIC EXCURSION: 2 CM
TRIGL SERPL-MCNC: 145 MG/DL (ref 35–150)
TROPONIN I SERPL HS-MCNC: 1229.7 PG/ML
TV REST PULMONARY ARTERY PRESSURE: 46 MMHG
VIT B12 SERPL-MCNC: 232 PG/ML (ref 193–986)
VLDLC SERPL-MCNC: 29 MG/DL
WBC # BLD AUTO: 11.12 K/UL (ref 4.5–11)

## 2023-03-22 PROCEDURE — 25500020 PHARM REV CODE 255: Performed by: INTERNAL MEDICINE

## 2023-03-22 PROCEDURE — 25000003 PHARM REV CODE 250: Performed by: NURSE PRACTITIONER

## 2023-03-22 PROCEDURE — 93010 ELECTROCARDIOGRAM REPORT: CPT | Mod: ,,, | Performed by: INTERNAL MEDICINE

## 2023-03-22 PROCEDURE — 85730 THROMBOPLASTIN TIME PARTIAL: CPT | Performed by: NURSE PRACTITIONER

## 2023-03-22 PROCEDURE — 20000000 HC ICU ROOM

## 2023-03-22 PROCEDURE — 63600175 PHARM REV CODE 636 W HCPCS: Performed by: NURSE PRACTITIONER

## 2023-03-22 PROCEDURE — 99233 SBSQ HOSP IP/OBS HIGH 50: CPT | Mod: ,,, | Performed by: INTERNAL MEDICINE

## 2023-03-22 PROCEDURE — 80053 COMPREHEN METABOLIC PANEL: CPT | Performed by: NURSE PRACTITIONER

## 2023-03-22 PROCEDURE — 99900035 HC TECH TIME PER 15 MIN (STAT)

## 2023-03-22 PROCEDURE — 25000003 PHARM REV CODE 250: Performed by: HOSPITALIST

## 2023-03-22 PROCEDURE — 27000221 HC OXYGEN, UP TO 24 HOURS

## 2023-03-22 PROCEDURE — 25000003 PHARM REV CODE 250: Performed by: EMERGENCY MEDICINE

## 2023-03-22 PROCEDURE — 63600175 PHARM REV CODE 636 W HCPCS: Performed by: HOSPITALIST

## 2023-03-22 PROCEDURE — 93010 EKG 12-LEAD: ICD-10-PCS | Mod: ,,, | Performed by: INTERNAL MEDICINE

## 2023-03-22 PROCEDURE — 25000003 PHARM REV CODE 250: Performed by: INTERNAL MEDICINE

## 2023-03-22 PROCEDURE — 83605 ASSAY OF LACTIC ACID: CPT | Performed by: NURSE PRACTITIONER

## 2023-03-22 PROCEDURE — 85730 THROMBOPLASTIN TIME PARTIAL: CPT | Performed by: HOSPITALIST

## 2023-03-22 PROCEDURE — 82550 ASSAY OF CK (CPK): CPT | Performed by: NURSE PRACTITIONER

## 2023-03-22 PROCEDURE — 85025 COMPLETE CBC W/AUTO DIFF WBC: CPT | Performed by: NURSE PRACTITIONER

## 2023-03-22 PROCEDURE — 94761 N-INVAS EAR/PLS OXIMETRY MLT: CPT

## 2023-03-22 PROCEDURE — 94660 CPAP INITIATION&MGMT: CPT

## 2023-03-22 PROCEDURE — 99233 PR SUBSEQUENT HOSPITAL CARE,LEVL III: ICD-10-PCS | Mod: ,,, | Performed by: INTERNAL MEDICINE

## 2023-03-22 PROCEDURE — 93005 ELECTROCARDIOGRAM TRACING: CPT

## 2023-03-22 PROCEDURE — 84484 ASSAY OF TROPONIN QUANT: CPT | Performed by: NURSE PRACTITIONER

## 2023-03-22 RX ORDER — LORAZEPAM 2 MG/ML
2 INJECTION INTRAMUSCULAR ONCE
Status: COMPLETED | OUTPATIENT
Start: 2023-03-22 | End: 2023-03-22

## 2023-03-22 RX ORDER — LORAZEPAM 2 MG/ML
2 INJECTION INTRAMUSCULAR ONCE
Status: DISCONTINUED | OUTPATIENT
Start: 2023-03-22 | End: 2023-03-23

## 2023-03-22 RX ORDER — CLORAZEPATE DIPOTASSIUM 3.75 MG/1
3.75 TABLET ORAL 3 TIMES DAILY
Status: DISCONTINUED | OUTPATIENT
Start: 2023-03-22 | End: 2023-03-23

## 2023-03-22 RX ORDER — ROCURONIUM BROMIDE 10 MG/ML
INJECTION, SOLUTION INTRAVENOUS
Status: DISPENSED
Start: 2023-03-22 | End: 2023-03-23

## 2023-03-22 RX ORDER — QUETIAPINE FUMARATE 100 MG/1
100 TABLET, FILM COATED ORAL NIGHTLY
Status: DISCONTINUED | OUTPATIENT
Start: 2023-03-22 | End: 2023-03-24

## 2023-03-22 RX ORDER — PROPOFOL 10 MG/ML
INJECTION, EMULSION INTRAVENOUS
Status: DISPENSED
Start: 2023-03-22 | End: 2023-03-23

## 2023-03-22 RX ORDER — ETOMIDATE 2 MG/ML
INJECTION INTRAVENOUS
Status: DISPENSED
Start: 2023-03-22 | End: 2023-03-23

## 2023-03-22 RX ORDER — DIPHENHYDRAMINE HYDROCHLORIDE 50 MG/ML
50 INJECTION INTRAMUSCULAR; INTRAVENOUS EVERY 6 HOURS PRN
Status: DISCONTINUED | OUTPATIENT
Start: 2023-03-22 | End: 2023-04-01

## 2023-03-22 RX ADMIN — LEVETIRACETAM 500 MG: 500 TABLET, FILM COATED ORAL at 08:03

## 2023-03-22 RX ADMIN — DEXMEDETOMIDINE HYDROCHLORIDE 0.3 MCG/KG/HR: 100 INJECTION, SOLUTION, CONCENTRATE INTRAVENOUS at 07:03

## 2023-03-22 RX ADMIN — LORAZEPAM 2 MG: 2 INJECTION INTRAMUSCULAR; INTRAVENOUS at 03:03

## 2023-03-22 RX ADMIN — LORAZEPAM 2 MG: 2 INJECTION INTRAMUSCULAR; INTRAVENOUS at 12:03

## 2023-03-22 RX ADMIN — CLORAZEPATE DIPOTASSIUM 3.75 MG: 3.75 TABLET ORAL at 08:03

## 2023-03-22 RX ADMIN — LORAZEPAM 2 MG: 2 INJECTION INTRAMUSCULAR; INTRAVENOUS at 01:03

## 2023-03-22 RX ADMIN — MUPIROCIN: 20 OINTMENT TOPICAL at 08:03

## 2023-03-22 RX ADMIN — LORAZEPAM 2 MG: 2 INJECTION INTRAMUSCULAR; INTRAVENOUS at 06:03

## 2023-03-22 RX ADMIN — LORAZEPAM 2 MG: 2 INJECTION INTRAMUSCULAR; INTRAVENOUS at 07:03

## 2023-03-22 RX ADMIN — FOLIC ACID: 5 INJECTION, SOLUTION INTRAMUSCULAR; INTRAVENOUS; SUBCUTANEOUS at 08:03

## 2023-03-22 RX ADMIN — DIPHENHYDRAMINE HYDROCHLORIDE 50 MG: 50 INJECTION, SOLUTION INTRAMUSCULAR; INTRAVENOUS at 10:03

## 2023-03-22 RX ADMIN — LORAZEPAM 2 MG: 2 INJECTION INTRAMUSCULAR; INTRAVENOUS at 08:03

## 2023-03-22 RX ADMIN — LORAZEPAM 2 MG: 2 INJECTION INTRAMUSCULAR; INTRAVENOUS at 02:03

## 2023-03-22 RX ADMIN — ROCURONIUM BROMIDE 100 MG: 10 INJECTION, SOLUTION INTRAVENOUS at 11:03

## 2023-03-22 RX ADMIN — SODIUM BICARBONATE: 84 INJECTION, SOLUTION INTRAVENOUS at 08:03

## 2023-03-22 RX ADMIN — QUETIAPINE FUMARATE 100 MG: 100 TABLET ORAL at 08:03

## 2023-03-22 RX ADMIN — DEXMEDETOMIDINE HYDROCHLORIDE 0.2 MCG/KG/HR: 100 INJECTION, SOLUTION, CONCENTRATE INTRAVENOUS at 07:03

## 2023-03-22 RX ADMIN — HEPARIN SODIUM 20 UNITS/KG/HR: 10000 INJECTION, SOLUTION INTRAVENOUS at 10:03

## 2023-03-22 RX ADMIN — PERFLUTREN 1.5 ML: 6.52 INJECTION, SUSPENSION INTRAVENOUS at 08:03

## 2023-03-22 RX ADMIN — LORAZEPAM 2 MG: 2 INJECTION INTRAMUSCULAR; INTRAVENOUS at 05:03

## 2023-03-22 RX ADMIN — CEFTRIAXONE SODIUM 1 G: 1 INJECTION, POWDER, FOR SOLUTION INTRAMUSCULAR; INTRAVENOUS at 02:03

## 2023-03-22 RX ADMIN — DOXEPIN HYDROCHLORIDE 25 MG: 25 CAPSULE ORAL at 08:03

## 2023-03-22 RX ADMIN — CLORAZEPATE DIPOTASSIUM 3.75 MG: 3.75 TABLET ORAL at 02:03

## 2023-03-22 RX ADMIN — ETOMIDATE 20 MG: 2 INJECTION, SOLUTION INTRAVENOUS at 11:03

## 2023-03-22 RX ADMIN — ASPIRIN 81 MG: 81 TABLET, DELAYED RELEASE ORAL at 08:03

## 2023-03-22 RX ADMIN — LORAZEPAM 2 MG: 2 INJECTION INTRAMUSCULAR; INTRAVENOUS at 09:03

## 2023-03-22 RX ADMIN — HEPARIN SODIUM 15 UNITS/KG/HR: 10000 INJECTION, SOLUTION INTRAVENOUS at 11:03

## 2023-03-22 RX ADMIN — LORAZEPAM 2 MG: 2 INJECTION INTRAMUSCULAR; INTRAVENOUS at 10:03

## 2023-03-22 RX ADMIN — HEPARIN SODIUM 15 UNITS/KG/HR: 10000 INJECTION, SOLUTION INTRAVENOUS at 05:03

## 2023-03-22 NOTE — SUBJECTIVE & OBJECTIVE
Interval History:  Patient without complaints      Objective:     Vital Signs (Most Recent):  Temp: 99.5 °F (37.5 °C) (03/22/23 0400)  Pulse: (!) 126 (03/22/23 0400)  Resp: 13 (03/22/23 0400)  BP: (!) 146/68 (03/22/23 0330)  SpO2: 99 % (03/22/23 0400)   Vital Signs (24h Range):  Temp:  [98.4 °F (36.9 °C)-99.5 °F (37.5 °C)] 99.5 °F (37.5 °C)  Pulse:  [] 126  Resp:  [11-26] 13  SpO2:  [86 %-99 %] 99 %  BP: ()/() 146/68     Weight: 114.7 kg (252 lb 13.9 oz)  Body mass index is 34.29 kg/m².      Intake/Output Summary (Last 24 hours) at 3/22/2023 0553  Last data filed at 3/22/2023 0401  Gross per 24 hour   Intake 4799.39 ml   Output 1100 ml   Net 3699.39 ml       Physical Exam  Vitals reviewed.   Constitutional:       Appearance: Normal appearance.      Interventions: He is not intubated.  HENT:      Head: Normocephalic and atraumatic.      Nose: Nose normal.      Mouth/Throat:      Mouth: Mucous membranes are dry.      Pharynx: Oropharynx is clear.   Eyes:      Extraocular Movements: Extraocular movements intact.      Conjunctiva/sclera: Conjunctivae normal.      Pupils: Pupils are equal, round, and reactive to light.   Cardiovascular:      Rate and Rhythm: Normal rate.      Heart sounds: Normal heart sounds. No murmur heard.  Pulmonary:      Effort: Pulmonary effort is normal. He is not intubated.      Breath sounds: Normal breath sounds.   Abdominal:      General: Abdomen is flat. Bowel sounds are normal.      Palpations: Abdomen is soft.   Musculoskeletal:         General: Normal range of motion.      Cervical back: Normal range of motion and neck supple.      Right lower leg: No edema.      Left lower leg: No edema.   Skin:     General: Skin is warm and dry.      Capillary Refill: Capillary refill takes less than 2 seconds.   Neurological:      General: No focal deficit present.      Mental Status: He is alert and oriented to person, place, and time.   Psychiatric:         Mood and Affect: Mood  normal.         Behavior: Behavior normal.     Review of Systems    Vents:  Oxygen Concentration (%): 40 (03/22/23 0420)    Lines/Drains/Airways       Drain  Duration                  Urethral Catheter 03/21/23 1901 <1 day              Peripheral Intravenous Line  Duration                  Peripheral IV - Single Lumen 03/21/23 1206 Posterior;Right Hand <1 day         Peripheral IV - Single Lumen 03/21/23 1901 18 G Anterior;Left Forearm <1 day                    Significant Labs:    CBC/Anemia Profile:  Recent Labs   Lab 03/21/23  1237 03/21/23 1345 03/21/23 2056 03/21/23 2139 03/22/23 0423   WBC 16.06*  --  10.25  --  11.12*   HGB 12.1*  --  10.2*  --  10.2*   HCT 36.6* 33* 29.9*  --  30.1*   *  --  97*  --  94*   .7*  --  103.5*  --  103.4*   RDW 15.0*  --  15.0*  --  15.0*   FOLATE  --   --   --  >20.0*  --    [id number]  --   --   --  232  --         Chemistries:  Recent Labs   Lab 03/21/23 1237 03/21/23 2056 03/22/23 0423     --  144   K 3.9  --  3.8   CL 98  --  101   CO2 18*  --  35*   BUN 38*  --  38*   CREATININE 4.37*  --  3.24*   CALCIUM 8.8  --  7.9*   ALBUMIN 3.9  --  3.4*   PROT 7.0  --  6.5   BILITOT 0.8  --  1.0   ALKPHOS 60  --  49   ALT 68*  --  66*   *  --  182*   MG  --  1.3*  --        Recent Lab Results  (Last 5 results in the past 24 hours)        03/22/23 0423 03/21/23 2139 03/21/23 2056 03/21/23 2055 03/21/23 2049        Albumin/Globulin Ratio 1.1               Albumin 3.4               Alkaline Phosphatase 49               ALT 66               Ammonia   29             Anion Gap 12               aPTT 39.9         26.3                      Baso # 0.04     0.04           Basophil % 0.4     0.4           BILIRUBIN TOTAL 1.0               BUN 38               BUN/CREAT RATIO 12               Calcium 7.9               Chloride 101               CHOL/HDLC Ratio   3.9             Cholesterol   188  Comment:   <200 mg/dL:  Desirable  200-240  mg/dL: Borderline High  >240 mg/dL:  High             CO2 35               CPK 9,946               Creatinine 3.24               Differential Type Auto     Auto           eGFR 23               Eos # 0.08     0.03           Eosinophil % 0.7     0.3           Folate   >20.0             Globulin, Total 3.1               Glucose 104               HDL   48  Comment:   <40 mg/dL: Low HDL  40-60 mg/dL: Normal  >60 mg/dL: Desirable             Hematocrit 30.1     29.9           Hemoglobin 10.2     10.2           Hep A IgM   Non-Reactive             Hep B C IgM   Non-Reactive             Hepatitis B Surface Ag   Non-Reactive             Hepatitis C Ab   Non-Reactive             Immature Grans (Abs) 0.05     0.05           Immature Granulocytes 0.4     0.5           INR         1.09       Lactate, Cedrick 2.8  Comment: A repeat order for Lactic Acid has been placed for collection in 2 hours.               LDL Calculated   111             LDL/HDL Ratio   2.3             Lymph # 1.34     0.98           Lymph % 12.1     9.6           Magnesium     1.3           MCH 35.1     35.3           MCHC 33.9     34.1           .4     103.5           Mono # 0.81     0.68           Mono % 7.3     6.6           MPV 9.6     9.7           Neutrophils, Abs 8.80     8.47           Neutrophils Relative 79.1     82.6           Non-HDL Cholesterol   140             nRBC 0.0     0.0           NUCLEATED RBC ABSOLUTE 0.00     0.00           Platelets 94     97           Potassium 3.8               PROTEIN TOTAL 6.5               Protime         13.7       RBC 2.91     2.89           RDW 15.0     15.0           Sodium 144               Triglycerides   145  Comment:   Normal:  <150 mg/dL  Borderline High: 150-199 mg/dL  High:   200-499 mg/dL  Very High:  >=500             Troponin I High Sensitivity       808.8         Vitamin B-12   232             VLDL Cholesterol Camilo   29             WBC 11.12     10.25                                  Significant  Imaging:  I have reviewed all pertinent imaging results/findings within the past 24 hours.

## 2023-03-22 NOTE — PLAN OF CARE
[facility] ICU  Initial Discharge Assessment       Primary Care Provider: Raymundo Cabrera MD    Admission Diagnosis: Tourette syndrome [F95.2]  Acute kidney injury [N17.9]  Alcohol withdrawal syndrome with complication [F10.939]    Admission Date: 3/21/2023  Expected Discharge Date:     Discharge Barriers Identified: None    Payor: MEDICARE / Plan: MEDICARE PART A & B / Product Type: Government /     Extended Emergency Contact Information  Primary Emergency Contact: Kayleen Ladd   [place]  Mobile Phone: [phone]  Relation: Mother  Preferred language: English   needed? No    Discharge Plan A: Rehab  Discharge Plan B: Rehab      [facility] ([place]) - [place], [place] - [address]  [address]  [place] [address]  Phone: [phone] Fax: [phone]      Initial Assessment (most recent)       Adult Discharge Assessment - 03/22/23 1329          Discharge Assessment    Assessment Type Discharge Planning Assessment     Confirmed/corrected address, phone number and insurance Yes     Source of Information family     Communicated JEZ with patient/caregiver Date not available/Unable to determine     People in Home parent(s)     Do you expect to return to your current living situation? Yes     Do you have help at home or someone to help you manage your care at home? Yes     Prior to hospitilization cognitive status: Unable to Assess     Equipment Currently Used at Home none     Patient currently being followed by outpatient case management? No     Do you currently have service(s) that help you manage your care at home? No     Do you take prescription medications? Yes     Do you have prescription coverage? Yes     Coverage medicare     Do you have any problems affording any of your prescribed medications? No     How do you get to doctors appointments? car, drives self     Are you on dialysis? No     Discharge Plan A Rehab     Discharge Plan B Rehab     DME Needed Upon  Discharge  none     Discharge Plan discussed with: Parent(s)     Discharge Barriers Identified None        Financial Resource Strain    How hard is it for you to pay for the very basics like food, housing, medical care, and heating? Not hard at all        Housing Stability    In the last 12 months, was there a time when you were not able to pay the mortgage or rent on time? No     In the last 12 months, how many places have you lived? 1     In the last 12 months, was there a time when you did not have a steady place to sleep or slept in a shelter (including now)? No        Transportation Needs    In the past 12 months, has lack of transportation kept you from medical appointments or from getting medications? No     In the past 12 months, has lack of transportation kept you from meetings, work, or from getting things needed for daily living? No        Social Connections    In a typical week, how many times do you talk on the phone with family, friends, or neighbors? Three times a week     How often do you get together with friends or relatives? Three times a week     How often do you attend Jewish or Holiness services? Never     Do you belong to any clubs or organizations such as Jewish groups, unions, fraternal or athletic groups, or school groups? No     How often do you attend meetings of the clubs or organizations you belong to? Never        Alcohol Use    Q1: How often do you have a drink containing alcohol? 4 or more times a week                        Ss spoke with pt's mother isaak and [facility] and plans to return at d/c. Ss to fax referral to [facility] closer to d/c. Prior to [facility] going to [facility] he was living with mother at home. Ss following.

## 2023-03-22 NOTE — PLAN OF CARE
Pt having hallucinations and unable to answer questions at this time. Ss attempted to contact pt's mother isaak with no answer. Ss following.

## 2023-03-22 NOTE — PROGRESS NOTES
[facility] ICU  Pulmonology  Progress Note    Patient Name: Carol Ladd Jr.  MRN: [id number]  Admission Date: 3/21/2023  Hospital Length of Stay: 1 days  Code Status: No Order  Attending Provider: Des Navarro MD  Primary Care Provider: Raymundo Cabrera MD   Principal Problem: Alcohol withdrawal syndrome with complication    Subjective:     Interval History:  Patient without complaints      Objective:     Vital Signs (Most Recent):  Temp: 99.5 °F (37.5 °C) (03/22/23 0400)  Pulse: (!) 126 (03/22/23 0400)  Resp: 13 (03/22/23 0400)  BP: (!) 146/68 (03/22/23 0330)  SpO2: 99 % (03/22/23 0400)   Vital Signs (24h Range):  Temp:  [98.4 °F (36.9 °C)-99.5 °F (37.5 °C)] 99.5 °F (37.5 °C)  Pulse:  [] 126  Resp:  [11-26] 13  SpO2:  [86 %-99 %] 99 %  BP: ()/() 146/68     Weight: 114.7 kg (252 lb 13.9 oz)  Body mass index is 34.29 kg/m².      Intake/Output Summary (Last 24 hours) at 3/22/2023 0553  Last data filed at 3/22/2023 0401  Gross per 24 hour   Intake 4799.39 ml   Output 1100 ml   Net 3699.39 ml       Physical Exam  Vitals reviewed.   Constitutional:       Appearance: Normal appearance.      Interventions: He is not intubated.  HENT:      Head: Normocephalic and atraumatic.      Nose: Nose normal.      Mouth/Throat:      Mouth: Mucous membranes are dry.      Pharynx: Oropharynx is clear.   Eyes:      Extraocular Movements: Extraocular movements intact.      Conjunctiva/sclera: Conjunctivae normal.      Pupils: Pupils are equal, round, and reactive to light.   Cardiovascular:      Rate and Rhythm: Normal rate.      Heart sounds: Normal heart sounds. No murmur heard.  Pulmonary:      Effort: Pulmonary effort is normal. He is not intubated.      Breath sounds: Normal breath sounds.   Abdominal:      General: Abdomen is flat. Bowel sounds are normal.      Palpations: Abdomen is soft.   Musculoskeletal:         General: Normal range of motion.      Cervical back: Normal range of  motion and neck supple.      Right lower leg: No edema.      Left lower leg: No edema.   Skin:     General: Skin is warm and dry.      Capillary Refill: Capillary refill takes less than 2 seconds.   Neurological:      General: No focal deficit present.      Mental Status: He is alert and oriented to person, place, and time.   Psychiatric:         Mood and Affect: Mood normal.         Behavior: Behavior normal.     Review of Systems    Vents:  Oxygen Concentration (%): 40 (03/22/23 0420)    Lines/Drains/Airways       Drain  Duration                  Urethral Catheter 03/21/23 1901 <1 day              Peripheral Intravenous Line  Duration                  Peripheral IV - Single Lumen 03/21/23 1206 Posterior;Right Hand <1 day         Peripheral IV - Single Lumen 03/21/23 1901 18 G Anterior;Left Forearm <1 day                    Significant Labs:    CBC/Anemia Profile:  Recent Labs   Lab 03/21/23 1237 03/21/23 1345 03/21/23 2056 03/21/23 2139 03/22/23 0423   WBC 16.06*  --  10.25  --  11.12*   HGB 12.1*  --  10.2*  --  10.2*   HCT 36.6* 33* 29.9*  --  30.1*   *  --  97*  --  94*   .7*  --  103.5*  --  103.4*   RDW 15.0*  --  15.0*  --  15.0*   FOLATE  --   --   --  >20.0*  --    [id number]  --   --   --  232  --         Chemistries:  Recent Labs   Lab 03/21/23 1237 03/21/23 2056 03/22/23 0423     --  144   K 3.9  --  3.8   CL 98  --  101   CO2 18*  --  35*   BUN 38*  --  38*   CREATININE 4.37*  --  3.24*   CALCIUM 8.8  --  7.9*   ALBUMIN 3.9  --  3.4*   PROT 7.0  --  6.5   BILITOT 0.8  --  1.0   ALKPHOS 60  --  49   ALT 68*  --  66*   *  --  182*   MG  --  1.3*  --        Recent Lab Results  (Last 5 results in the past 24 hours)        03/22/23  0423   03/21/23 2139 03/21/23 2056 03/21/23 2055 03/21/23 2049        Albumin/Globulin Ratio 1.1               Albumin 3.4               Alkaline Phosphatase 49               ALT 66               Ammonia   29             Anion Gap  12               aPTT 39.9         26.3                      Baso # 0.04     0.04           Basophil % 0.4     0.4           BILIRUBIN TOTAL 1.0               BUN 38               BUN/CREAT RATIO 12               Calcium 7.9               Chloride 101               CHOL/HDLC Ratio   3.9             Cholesterol   188  Comment:   <200 mg/dL:  Desirable  200-240 mg/dL: Borderline High  >240 mg/dL:  High             CO2 35               CPK 9,946               Creatinine 3.24               Differential Type Auto     Auto           eGFR 23               Eos # 0.08     0.03           Eosinophil % 0.7     0.3           Folate   >20.0             Globulin, Total 3.1               Glucose 104               HDL   48  Comment:   <40 mg/dL: Low HDL  40-60 mg/dL: Normal  >60 mg/dL: Desirable             Hematocrit 30.1     29.9           Hemoglobin 10.2     10.2           Hep A IgM   Non-Reactive             Hep B C IgM   Non-Reactive             Hepatitis B Surface Ag   Non-Reactive             Hepatitis C Ab   Non-Reactive             Immature Grans (Abs) 0.05     0.05           Immature Granulocytes 0.4     0.5           INR         1.09       Lactate, Cedrick 2.8  Comment: A repeat order for Lactic Acid has been placed for collection in 2 hours.               LDL Calculated   111             LDL/HDL Ratio   2.3             Lymph # 1.34     0.98           Lymph % 12.1     9.6           Magnesium     1.3           MCH 35.1     35.3           MCHC 33.9     34.1           .4     103.5           Mono # 0.81     0.68           Mono % 7.3     6.6           MPV 9.6     9.7           Neutrophils, Abs 8.80     8.47           Neutrophils Relative 79.1     82.6           Non-HDL Cholesterol   140             nRBC 0.0     0.0           NUCLEATED RBC ABSOLUTE 0.00     0.00           Platelets 94     97           Potassium 3.8               PROTEIN TOTAL 6.5               Protime         13.7       RBC 2.91     2.89           RDW  15.0     15.0           Sodium 144               Triglycerides   145  Comment:   Normal:  <150 mg/dL  Borderline High: 150-199 mg/dL  High:   200-499 mg/dL  Very High:  >=500             Troponin I High Sensitivity       808.8         Vitamin B-12   232             VLDL Cholesterol Camilo   29             WBC 11.12     10.25                                  Significant Imaging:  I have reviewed all pertinent imaging results/findings within the past 24 hours.    Assessment/Plan:     Neuro  AMS (altered mental status)  Seems better now he is on BiPAP for obstructive sleep apnea with no issues    Tourette syndrome  States this is the reason for his drinking - to help control his Tics    Psychiatric  * Alcohol withdrawal syndrome with complication  Long history of ETOH use  - pt tachycardia, diaphoretic and agitated on exam    At [place] since for detox since 3/19  Patient less agitated this morning still tachycardic use Ativan as needed    Cardiac/Vascular  HTN (hypertension)  Blood pressure is better this morning will continue to watch carefully    Renal/  Metabolic acidosis  Last lactic acid 2 point a cultures taken acidosis has resolved    Acute kidney injury  Baseline Cr 1.23... Cr 2.81 on 03/15   Urine output  is improving, CPK is increasing      Orthopedic  Rhabdomyolysis  CPK 1000 today continue bicarb infusion    Other  Obstructive sleep apnea  Noted use BiPAP at night                 Des Navarro MD  Pulmonology  [facility]

## 2023-03-22 NOTE — ASSESSMENT & PLAN NOTE
Long history of ETOH use  - pt tachycardia, diaphoretic and agitated on exam    At [place] since for detox since 3/19  Patient less agitated this morning still tachycardic use Ativan as needed

## 2023-03-22 NOTE — PLAN OF CARE
Problem: Skin Injury Risk Increased  Goal: Skin Health and Integrity  Outcome: Ongoing, Progressing  Intervention: Optimize Skin Protection  Flowsheets (Taken 3/22/2023 0441)  Pressure Reduction Techniques:   frequent weight shift encouraged   pressure points protected   weight shift assistance provided  Pressure Reduction Devices:   alternating pressure pump (ADD)   foam padding utilized  Skin Protection:   adhesive use limited   incontinence pads utilized   skin-to-skin areas padded   transparent dressing maintained  Head of Bed (HOB) Positioning: HOB at 20-30 degrees  Intervention: Promote and Optimize Oral Intake  Flowsheets (Taken 3/22/2023 0441)  Oral Nutrition Promotion: rest periods promoted   Care plan updated

## 2023-03-23 PROBLEM — R79.89 ELEVATED TROPONIN LEVEL NOT DUE MYOCARDIAL INFARCTION: Status: ACTIVE | Noted: 2023-03-23

## 2023-03-23 PROBLEM — G92.9 ENCEPHALOPATHY, TOXIC: Status: ACTIVE | Noted: 2023-03-23

## 2023-03-23 LAB
ALBUMIN SERPL BCP-MCNC: 3.1 G/DL (ref 3.5–5)
ALBUMIN/GLOB SERPL: 1 {RATIO}
ALP SERPL-CCNC: 46 U/L (ref 45–115)
ALT SERPL W P-5'-P-CCNC: 73 U/L (ref 16–61)
ANION GAP SERPL CALCULATED.3IONS-SCNC: 15 MMOL/L (ref 7–16)
ANISOCYTOSIS BLD QL SMEAR: ABNORMAL
APTT PPP: 122.9 SECONDS (ref 25.2–37.3)
APTT PPP: 44.4 SECONDS (ref 25.2–37.3)
AST SERPL W P-5'-P-CCNC: 236 U/L (ref 15–37)
BASOPHILS # BLD AUTO: 0.05 K/UL (ref 0–0.2)
BASOPHILS NFR BLD AUTO: 0.5 % (ref 0–1)
BILIRUB SERPL-MCNC: 0.9 MG/DL (ref ?–1.2)
BUN SERPL-MCNC: 33 MG/DL (ref 7–18)
BUN/CREAT SERPL: 14 (ref 6–20)
CALCIUM SERPL-MCNC: 7.6 MG/DL (ref 8.5–10.1)
CHLORIDE SERPL-SCNC: 96 MMOL/L (ref 98–107)
CK SERPL-CCNC: ABNORMAL U/L (ref 39–308)
CO2 SERPL-SCNC: 35 MMOL/L (ref 21–32)
CREAT SERPL-MCNC: 2.4 MG/DL (ref 0.7–1.3)
EGFR (NO RACE VARIABLE) (RUSH/TITUS): 33 ML/MIN/1.73M²
EOSINOPHIL # BLD AUTO: 0.13 K/UL (ref 0–0.5)
EOSINOPHIL NFR BLD AUTO: 1.4 % (ref 1–4)
ERYTHROCYTE [DISTWIDTH] IN BLOOD BY AUTOMATED COUNT: 15.1 % (ref 11.5–14.5)
GLOBULIN SER-MCNC: 3.2 G/DL (ref 2–4)
GLUCOSE SERPL-MCNC: 129 MG/DL (ref 74–106)
GLUCOSE SERPL-MCNC: 142 MG/DL (ref 70–105)
HCO3 UR-SCNC: 38.6 MMOL/L (ref 21–28)
HCT VFR BLD AUTO: 27.2 % (ref 40–54)
HGB BLD-MCNC: 9 G/DL (ref 13.5–18)
IMM GRANULOCYTES # BLD AUTO: 0.06 K/UL (ref 0–0.04)
IMM GRANULOCYTES NFR BLD: 0.6 % (ref 0–0.4)
LYMPHOCYTES # BLD AUTO: 1.34 K/UL (ref 1–4.8)
LYMPHOCYTES NFR BLD AUTO: 14.1 % (ref 27–41)
MACROCYTES BLD QL SMEAR: ABNORMAL
MCH RBC QN AUTO: 35.4 PG (ref 27–31)
MCHC RBC AUTO-ENTMCNC: 33.1 G/DL (ref 32–36)
MCV RBC AUTO: 107.1 FL (ref 80–96)
MONOCYTES # BLD AUTO: 0.68 K/UL (ref 0–0.8)
MONOCYTES NFR BLD AUTO: 7.2 % (ref 2–6)
MPC BLD CALC-MCNC: 10.4 FL (ref 9.4–12.4)
NEUTROPHILS # BLD AUTO: 7.23 K/UL (ref 1.8–7.7)
NEUTROPHILS NFR BLD AUTO: 76.2 % (ref 53–65)
NRBC # BLD AUTO: 0 X10E3/UL
NRBC, AUTO (.00): 0 %
PCO2 BLDA: 53 MMHG (ref 35–48)
PH SMN: 7.47 [PH] (ref 7.35–7.45)
PLATELET # BLD AUTO: 88 K/UL (ref 150–400)
PLATELET MORPHOLOGY: ABNORMAL
PO2 BLDA: 85 MMHG (ref 83–108)
POC BASE EXCESS: 12.9 MMOL/L (ref -2–3)
POC SATURATED O2: 97 % (ref 95–98)
POLYCHROMASIA BLD QL SMEAR: ABNORMAL
POTASSIUM SERPL-SCNC: 3.9 MMOL/L (ref 3.5–5.1)
PROT SERPL-MCNC: 6.3 G/DL (ref 6.4–8.2)
RBC # BLD AUTO: 2.54 M/UL (ref 4.6–6.2)
SODIUM SERPL-SCNC: 142 MMOL/L (ref 136–145)
STOMATOCYTES BLD QL SMEAR: ABNORMAL
TROPONIN I SERPL HS-MCNC: 679.4 PG/ML
UA COMPLETE W REFLEX CULTURE PNL UR: NO GROWTH
WBC # BLD AUTO: 9.49 K/UL (ref 4.5–11)

## 2023-03-23 PROCEDURE — 31500 INSERT EMERGENCY AIRWAY: CPT | Mod: ,,, | Performed by: HOSPITALIST

## 2023-03-23 PROCEDURE — 85730 THROMBOPLASTIN TIME PARTIAL: CPT | Performed by: INTERNAL MEDICINE

## 2023-03-23 PROCEDURE — 63600175 PHARM REV CODE 636 W HCPCS: Performed by: HOSPITALIST

## 2023-03-23 PROCEDURE — 99233 PR SUBSEQUENT HOSPITAL CARE,LEVL III: ICD-10-PCS | Mod: ,,, | Performed by: INTERNAL MEDICINE

## 2023-03-23 PROCEDURE — 82962 GLUCOSE BLOOD TEST: CPT

## 2023-03-23 PROCEDURE — 99900035 HC TECH TIME PER 15 MIN (STAT)

## 2023-03-23 PROCEDURE — 80053 COMPREHEN METABOLIC PANEL: CPT | Performed by: NURSE PRACTITIONER

## 2023-03-23 PROCEDURE — 84484 ASSAY OF TROPONIN QUANT: CPT | Performed by: NURSE PRACTITIONER

## 2023-03-23 PROCEDURE — 63600175 PHARM REV CODE 636 W HCPCS: Performed by: NURSE PRACTITIONER

## 2023-03-23 PROCEDURE — 82550 ASSAY OF CK (CPK): CPT | Performed by: NURSE PRACTITIONER

## 2023-03-23 PROCEDURE — 20000000 HC ICU ROOM

## 2023-03-23 PROCEDURE — 31500 PR INSERT, EMERGENCY ENDOTRACH AIRWAY: ICD-10-PCS | Mod: ,,, | Performed by: HOSPITALIST

## 2023-03-23 PROCEDURE — 82803 BLOOD GASES ANY COMBINATION: CPT

## 2023-03-23 PROCEDURE — 94761 N-INVAS EAR/PLS OXIMETRY MLT: CPT

## 2023-03-23 PROCEDURE — 51702 INSERT TEMP BLADDER CATH: CPT

## 2023-03-23 PROCEDURE — 36600 WITHDRAWAL OF ARTERIAL BLOOD: CPT

## 2023-03-23 PROCEDURE — 27000221 HC OXYGEN, UP TO 24 HOURS

## 2023-03-23 PROCEDURE — 85025 COMPLETE CBC W/AUTO DIFF WBC: CPT | Performed by: NURSE PRACTITIONER

## 2023-03-23 PROCEDURE — 25000003 PHARM REV CODE 250: Performed by: NURSE PRACTITIONER

## 2023-03-23 PROCEDURE — 94002 VENT MGMT INPAT INIT DAY: CPT

## 2023-03-23 PROCEDURE — 99900026 HC AIRWAY MAINTENANCE (STAT)

## 2023-03-23 PROCEDURE — 25000003 PHARM REV CODE 250: Performed by: EMERGENCY MEDICINE

## 2023-03-23 PROCEDURE — 25000003 PHARM REV CODE 250: Performed by: INTERNAL MEDICINE

## 2023-03-23 PROCEDURE — 25000003 PHARM REV CODE 250: Performed by: HOSPITALIST

## 2023-03-23 PROCEDURE — 31500 INSERT EMERGENCY AIRWAY: CPT

## 2023-03-23 PROCEDURE — 99233 SBSQ HOSP IP/OBS HIGH 50: CPT | Mod: ,,, | Performed by: INTERNAL MEDICINE

## 2023-03-23 RX ORDER — ROCURONIUM BROMIDE 10 MG/ML
100 INJECTION, SOLUTION INTRAVENOUS ONCE
Status: DISCONTINUED | OUTPATIENT
Start: 2023-03-23 | End: 2023-03-23

## 2023-03-23 RX ORDER — PROPOFOL 10 MG/ML
0-50 INJECTION, EMULSION INTRAVENOUS CONTINUOUS
Status: DISCONTINUED | OUTPATIENT
Start: 2023-03-23 | End: 2023-03-29

## 2023-03-23 RX ORDER — FENTANYL CITRAT/DEXTROSE 5%/PF 100 MCG/10
0-250 PATIENT CONTROLLED ANALGESIA SYRINGE INTRAVENOUS CONTINUOUS
Status: DISCONTINUED | OUTPATIENT
Start: 2023-03-23 | End: 2023-03-28

## 2023-03-23 RX ORDER — ETOMIDATE 2 MG/ML
INJECTION INTRAVENOUS CODE/TRAUMA/SEDATION MEDICATION
Status: COMPLETED | OUTPATIENT
Start: 2023-03-22 | End: 2023-03-22

## 2023-03-23 RX ORDER — NOREPINEPHRINE BITARTRATE/D5W 4MG/250ML
0-3 PLASTIC BAG, INJECTION (ML) INTRAVENOUS CONTINUOUS
Status: DISCONTINUED | OUTPATIENT
Start: 2023-03-23 | End: 2023-03-29

## 2023-03-23 RX ORDER — ROCURONIUM BROMIDE 10 MG/ML
INJECTION, SOLUTION INTRAVENOUS CODE/TRAUMA/SEDATION MEDICATION
Status: COMPLETED | OUTPATIENT
Start: 2023-03-22 | End: 2023-03-22

## 2023-03-23 RX ORDER — ETOMIDATE 2 MG/ML
20 INJECTION INTRAVENOUS ONCE
Status: DISCONTINUED | OUTPATIENT
Start: 2023-03-23 | End: 2023-03-23

## 2023-03-23 RX ADMIN — PROPOFOL 45 MCG/KG/MIN: 10 INJECTION, EMULSION INTRAVENOUS at 10:03

## 2023-03-23 RX ADMIN — PROPOFOL 20 MCG/KG/MIN: 10 INJECTION, EMULSION INTRAVENOUS at 05:03

## 2023-03-23 RX ADMIN — MUPIROCIN: 20 OINTMENT TOPICAL at 09:03

## 2023-03-23 RX ADMIN — PROPOFOL 45 MCG/KG/MIN: 10 INJECTION, EMULSION INTRAVENOUS at 07:03

## 2023-03-23 RX ADMIN — QUETIAPINE FUMARATE 100 MG: 100 TABLET ORAL at 08:03

## 2023-03-23 RX ADMIN — DEXMEDETOMIDINE HYDROCHLORIDE 1.4 MCG/KG/HR: 100 INJECTION, SOLUTION, CONCENTRATE INTRAVENOUS at 02:03

## 2023-03-23 RX ADMIN — LEVETIRACETAM 500 MG: 500 TABLET, FILM COATED ORAL at 08:03

## 2023-03-23 RX ADMIN — PROPOFOL 45 MCG/KG/MIN: 10 INJECTION, EMULSION INTRAVENOUS at 12:03

## 2023-03-23 RX ADMIN — DEXMEDETOMIDINE HYDROCHLORIDE 1.4 MCG/KG/HR: 100 INJECTION, SOLUTION, CONCENTRATE INTRAVENOUS at 05:03

## 2023-03-23 RX ADMIN — ASPIRIN 81 MG: 81 TABLET, DELAYED RELEASE ORAL at 10:03

## 2023-03-23 RX ADMIN — PROPOFOL 5 MCG/KG/MIN: 10 INJECTION, EMULSION INTRAVENOUS at 12:03

## 2023-03-23 RX ADMIN — PROPOFOL 45 MCG/KG/MIN: 10 INJECTION, EMULSION INTRAVENOUS at 09:03

## 2023-03-23 RX ADMIN — MUPIROCIN: 20 OINTMENT TOPICAL at 10:03

## 2023-03-23 RX ADMIN — DOXEPIN HYDROCHLORIDE 25 MG: 25 CAPSULE ORAL at 08:03

## 2023-03-23 RX ADMIN — DEXMEDETOMIDINE HYDROCHLORIDE 1.4 MCG/KG/HR: 100 INJECTION, SOLUTION, CONCENTRATE INTRAVENOUS at 08:03

## 2023-03-23 RX ADMIN — NOREPINEPHRINE BITARTRATE 0.02 MCG/KG/MIN: 4 INJECTION, SOLUTION INTRAVENOUS at 06:03

## 2023-03-23 RX ADMIN — SODIUM BICARBONATE: 84 INJECTION, SOLUTION INTRAVENOUS at 05:03

## 2023-03-23 RX ADMIN — LEVETIRACETAM 500 MG: 500 TABLET, FILM COATED ORAL at 10:03

## 2023-03-23 RX ADMIN — FENTANYL CITRATE 25 MCG/HR: 50 INJECTION, SOLUTION INTRAMUSCULAR; INTRAVENOUS at 12:03

## 2023-03-23 RX ADMIN — DEXMEDETOMIDINE HYDROCHLORIDE 1.4 MCG/KG/HR: 100 INJECTION, SOLUTION, CONCENTRATE INTRAVENOUS at 10:03

## 2023-03-23 RX ADMIN — DEXMEDETOMIDINE HYDROCHLORIDE 1.4 MCG/KG/HR: 100 INJECTION, SOLUTION, CONCENTRATE INTRAVENOUS at 11:03

## 2023-03-23 RX ADMIN — CEFTRIAXONE SODIUM 1 G: 1 INJECTION, POWDER, FOR SOLUTION INTRAMUSCULAR; INTRAVENOUS at 02:03

## 2023-03-23 RX ADMIN — DEXMEDETOMIDINE HYDROCHLORIDE 1.4 MCG/KG/HR: 100 INJECTION, SOLUTION, CONCENTRATE INTRAVENOUS at 07:03

## 2023-03-23 RX ADMIN — NOREPINEPHRINE BITARTRATE 0.04 MCG/KG/MIN: 4 INJECTION, SOLUTION INTRAVENOUS at 07:03

## 2023-03-23 RX ADMIN — SODIUM BICARBONATE: 84 INJECTION, SOLUTION INTRAVENOUS at 10:03

## 2023-03-23 RX ADMIN — SODIUM BICARBONATE: 84 INJECTION, SOLUTION INTRAVENOUS at 12:03

## 2023-03-23 RX ADMIN — PROPOFOL 45 MCG/KG/MIN: 10 INJECTION, EMULSION INTRAVENOUS at 04:03

## 2023-03-23 RX ADMIN — HEPARIN SODIUM 23 UNITS/KG/HR: 10000 INJECTION, SOLUTION INTRAVENOUS at 02:03

## 2023-03-23 RX ADMIN — FOLIC ACID: 5 INJECTION, SOLUTION INTRAMUSCULAR; INTRAVENOUS; SUBCUTANEOUS at 10:03

## 2023-03-23 NOTE — SUBJECTIVE & OBJECTIVE
Interval History:  Patient without complaints sedated      Objective:     Vital Signs (Most Recent):  Temp: 100.2 °F (37.9 °C) (03/23/23 0308)  Pulse: 82 (03/23/23 0530)  Resp: 14 (03/23/23 0530)  BP: (!) 91/44 (03/23/23 0530)  SpO2: 97 % (03/23/23 0530)   Vital Signs (24h Range):  Temp:  [98.3 °F (36.8 °C)-100.2 °F (37.9 °C)] 100.2 °F (37.9 °C)  Pulse:  [] 82  Resp:  [10-40] 14  SpO2:  [84 %-100 %] 97 %  BP: ()/() 91/44     Weight: 108.1 kg (238 lb 5.1 oz)  Body mass index is 32.32 kg/m².      Intake/Output Summary (Last 24 hours) at 3/23/2023 0558  Last data filed at 3/23/2023 0300  Gross per 24 hour   Intake 4636.5 ml   Output 1500 ml   Net 3136.5 ml       Physical Exam  Vitals reviewed.   Constitutional:       Appearance: Normal appearance.      Interventions: He is not intubated.  HENT:      Head: Normocephalic and atraumatic.      Nose: Nose normal.      Mouth/Throat:      Mouth: Mucous membranes are dry.      Pharynx: Oropharynx is clear.   Eyes:      Extraocular Movements: Extraocular movements intact.      Conjunctiva/sclera: Conjunctivae normal.      Pupils: Pupils are equal, round, and reactive to light.   Cardiovascular:      Rate and Rhythm: Normal rate.      Heart sounds: Normal heart sounds. No murmur heard.  Pulmonary:      Effort: Pulmonary effort is normal. He is not intubated.      Breath sounds: Normal breath sounds.   Abdominal:      General: Abdomen is flat. Bowel sounds are normal.      Palpations: Abdomen is soft.   Musculoskeletal:         General: Normal range of motion.      Cervical back: Normal range of motion and neck supple.      Right lower leg: No edema.      Left lower leg: No edema.   Skin:     General: Skin is warm and dry.      Capillary Refill: Capillary refill takes less than 2 seconds.   Neurological:      General: No focal deficit present.      Mental Status: He is alert and oriented to person, place, and time.   Psychiatric:         Mood and Affect: Mood  normal.         Behavior: Behavior normal.     Review of Systems    Vents:  Vent Mode: A/C (03/23/23 0000)  Ventilator Initiated: Yes (03/23/23 0000)  Set Rate: 500 BPM (03/23/23 0000)  Vt Set: 5 mL (03/23/23 0000)  Oxygen Concentration (%): 40 (03/23/23 0000)  Peak Airway Pressure: 21 cmH20 (03/23/23 0000)  Total Ve: 5.9 L/m (03/23/23 0000)  F/VT Ratio<105 (RSBI): (!) 33.33 (03/23/23 0000)    Lines/Drains/Airways       Drain  Duration                  NG/OG Tube 03/23/23 0014 [place] sump Right nostril <1 day              Airway  Duration                  Airway - Non-Surgical 03/22/23 2358 Endotracheal Tube <1 day              Peripheral Intravenous Line  Duration                  Peripheral IV - Single Lumen 03/23/23 0015 20 G Left;Posterior Forearm <1 day                    Significant Labs:    CBC/Anemia Profile:  Recent Labs   Lab 03/21/23  1237 03/21/23  1345 03/21/23 2056 03/21/23 2139 03/22/23  0423   WBC 16.06*  --  10.25  --  11.12*   HGB 12.1*  --  10.2*  --  10.2*   HCT 36.6* 33* 29.9*  --  30.1*   *  --  97*  --  94*   .7*  --  103.5*  --  103.4*   RDW 15.0*  --  15.0*  --  15.0*   FOLATE  --   --   --  >20.0*  --    [id number]  --   --   --  232  --         Chemistries:  Recent Labs   Lab 03/21/23  1237 03/21/23 2056 03/22/23  0423     --  144   K 3.9  --  3.8   CL 98  --  101   CO2 18*  --  35*   BUN 38*  --  38*   CREATININE 4.37*  --  3.24*   CALCIUM 8.8  --  7.9*   ALBUMIN 3.9  --  3.4*   PROT 7.0  --  6.5   BILITOT 0.8  --  1.0   ALKPHOS 60  --  49   ALT 68*  --  66*   *  --  182*   MG  --  1.3*  --        Recent Lab Results  (Last 5 results in the past 24 hours)        03/23/23  0214   03/23/23  0052   03/22/23  1713   03/22/23  1107   03/22/23  1046        aPTT   44.4   45.8     52.7       Lactate, Cedrick               POC Base Excess 12.9               POC HCO3 38.6               POC PCO2 53               POC PH 7.47               POC PO2 85               POC SATURATED  O2 97               Troponin I High Sensitivity       1,229.7                                Significant Imaging:  I have reviewed all pertinent imaging results/findings within the past 24 hours.

## 2023-03-23 NOTE — NURSING
0650: pt very restless and agitated will titrate diprivan back up until calm.  0715 resting quietly at present. No urine noted from condom cath.  0905: ptt reported back as122.9 heparin infusion on hold for 1 hour and rate decreased to 20unit/hr.  1000 spoke with MICHAEL Martino ok to reinsert nelson. This was done using sterile technique without difficulty. 750cc of dark, reddish urine obtained. Pt tolerated well  1005 dr moseley here. Ordered ekg and states that it is ok to stop heparin infusion. Noted swelling of left lateral thigh area, bruising noted. Showed to NP and she states just d/c heparin and will watch area.  1100 removed gauze wrap and 4x4's from left foot. Great toe nail came off with dressing removal. Foot cleaned. Only place bleeding if from toe nail bed. No other cuts noted. 2nd toe does having a lot of bruising noted and there is some maceration  between toes. Redressed with 4x4's and toe wrapped with elastic bandage for compression to control bleeding.  1500 continues to rest quietly. No leakage from toe dressing.   1800: continue to monitor. No changes in assessment. Will report to oncoming shift

## 2023-03-23 NOTE — ASSESSMENT & PLAN NOTE
Prozac and Doxepin on home medication list but states he was not taking these..   Was also started on those medications along  with tranxene, trazodone and Zyprexa at [place] over the last 2 days  -- Patient is likely starting to have withdraws from alcohol and subaxone; however, the combination of prozac and doxepin and trazadone all increase the risk of serotonin syndrome .. given this concern serotonin syndrome should be in the differential as well     Will watch

## 2023-03-23 NOTE — ASSESSMENT & PLAN NOTE
- Patient seen and evaluated by Dr. Avelar  - Troponin 573, 808, 1229, 679; EKG ST rate 110, no acute ischemic changes  - Echo 3/22/2023 with hyperdynamic systolic function, LVEF 75%. normal LV diastolic function, mild RV & RA enlargement, mild TR, pulm HTN, PASP 64mmHg  - Non-MI troponin elevation in setting of severe rhabdomyolysis, continue medical management   - No further cardiac workup needed at this time.  - Follow up in clinic with cardiology for outpatient ischemic evaluation  - Cardiology will sign off at this time, please call if any further assistance is needed

## 2023-03-23 NOTE — HPI
48 y/o male with PMH of HTN, ODD, tourett's syndrome, and opioid/alcohol abuse who was transferred to [facility] for increased agitation from [place]. He was admitted there 48 hours ago for ETOH detox. He drinks over 1/5 of alcohol daily and has for several years to help with his tourette's.  Family states that it helps better than medication. He has also been on Subaxone for several years as well.  His last drink was 03/19. ETOH level was over 400 at [facility].    On arrival to the ER his HR was 160 and he was diaphoretic.  Lactic acid was 10 on ABG and CK 3,333 and Cr 4.38. He was treated with 3 liters NS. He then became hypotensive and was started on Levophed. On exam he is lethargic due to receiving 6 mg IV ativan but he states that his muscle spasm/contractions are similar to his tics but much worse.  He was started on several psych medications at [facility] but was not taking any at home.     Due to active psychosis and alcohol withdrawal, decision was made to electively intubate overnight. Troponin elevated 873, 808, 1229 and cardiology consulted.     Patient seen today, 3/23/2023, currently intubated and sedated.

## 2023-03-23 NOTE — CONSULTS
[facility]  Adult Nutrition  Consult Note         Reason for Assessment  Reason For Assessment: consult (NG tube feeds)   Nutrition Risk Screen: no indicators present     Consult received for NG tube feeds.   Recommend Jevity 1.5@ 42ml/h with 60ml free water flush. Tube feeds provide 1512cals, 60g pro and 766ml fluid. Protein supplement provides extra 120cals and 30g pro. Current propofol rate at 30.48 provides 805cals.   Total calories 2437, 94g pro, 2206ml fluid.    RD monitoring for tolerance to tube feeds.  Last BM per flow sheets 3/21.      HPI:  46 y/o male who was transferred to [facility] for increased agitation from [place]. He was admitted there 48 hours ago for ETOH detox. He drinks over 1/5 of alcohol daily and has for several years to help with his tourette's.  Family states that he helps better than medication.  He has also been on Subaxone for several years as well.  His last drink for 03/19. ETOH level was over 400 at [place].  On arrival to the ER his HR was 160 and he was diaphoretic.  Lactic acid was 10 on ABG and CK 3,333 and Cr 4.38. He was treated with 3 liters NS. He then became hypotensive and was started on Levophed.   On exam he is lethargic due to receiving 6 mg IV ativan but he states that his muscle spasm/contractions are similar to his tics but much worse.  He was started on several psych medications at [place] but was not taking any at home.      PMH - HTN, ODD, tourette's, opoid abuse and ETOH abuse.       Malnutrition  Is Patient Malnourished: No  Skin Integrity  Carson Risk Assessment  Sensory Perception: 3-->slightly limited  Moisture: 3-->occasionally moist  Activity: 1-->bedfast  Mobility: 2-->very limited  Nutrition: 2-->probably inadequate  Friction and Shear: 2-->potential problem  Carson Score: 13    Nutrition Diagnosis  Inadequate energy intake   related to patient intubated and sedated as evidenced by requiring NG tube feeds for  nutrition    Nutrition Diagnosis Status: Chronic/ continues      Nutrition Risk   high  Comments on nutrition risk: NG feeds   Recent Labs   Lab 03/23/23  0539   *     Comments on Glucose: elevated possibly due to stress response  Nutrition Prescription / Recommendations  Recommendation/Intervention: Recommend Jevity 1.5@ 42ml/h with 60ml free water flush + No carb Prosource BID. Will provide 2430total calories( 1512from [employer] [place]+805 from propofol and 120cals from protein supplement), 94 total g pro with prosource and 2206 total free water.  Goals: weight maintenance, tolerance of tube feeds  Nutrition Goal Status: new  Communication of RD Recs: discussed on rounds  Current Diet Order: NPO  Chewing or Swallowing Difficulty?: Swallowing difficulty  Recommended Diet: Enteral Nutrition  Recommended Oral Supplement: No Oral Supplements  Is Nutrition Support Recommended: Yes   Enteral Nutrition Recommended Order:  Tube feeding via NG/ Dobhoff  Tube feeding formula: Jevity 1.5 Continuous 42 ml/h  Free Water Flush: 60 ml hourly  Modular Supplements:No Modular Supplements needed and ProSource No Carb Liquid Protein 2 times a day  Enteral Nutrition meets needs?: yes  Enteral Nutrition Status: New Order    Is Education Recommended: No  Monitor and Evaluation  % current Intake: New Enteral Nutrition Order with no documentation   % intake to meet estimated needs: Enteral Nutrition   Food and Nutrient Intake: enteral nutrition intake  Food and Nutrient Adminstration: enteral and parenteral nutrition administration  Anthropometric Measurements: weight change, body mass index, weight  Biochemical Data, Medical Tests and Procedures: electrolyte and renal panel, gastrointestinal profile, glucose/endocrine profile, inflammatory profile, lipid profile  Nutrition-Focused Physical Findings: overall appearance     Current Medical Diagnosis and Past Medical History     Past Medical History:   Diagnosis Date    Hypertension      Mixed obsessional thoughts and acts 01/24/2017    Tourette syndrome     Tourette's syndrome 01/24/2017     Nutrition/Diet History  Food Allergies: NKFA  Factors Affecting Nutritional Intake: NPO, on mechanical ventilation  Lab/Procedures/Meds  Recent Labs   Lab 03/23/23  0539      K 3.9   BUN 33*   CREATININE 2.40*   CALCIUM 7.6*   ALBUMIN 3.1*   CL 96*   ALT 73*   *     Last A1c:   Lab Results   Component Value Date    HGBA1C 5.5 12/13/2021     Lab Results   Component Value Date    RBC 2.54 (L) 03/23/2023    HGB 9.0 (L) 03/23/2023    HCT 27.2 (L) 03/23/2023    .1 (H) 03/23/2023    MCH 35.4 (H) 03/23/2023    MCHC 33.1 03/23/2023     Pertinent Labs Reviewed: reviewed  Pertinent Labs Comments: Sodium: 142  Potassium: 3.9  Chloride: 96 (L)  CO2: 35 (H)  Anion Gap: 15  BUN: 33 (H)  Creatinine: 2.40 (H)  BUN/CREAT RATIO: 14  eGFR: 33 (L)  Glucose: 129 (H)  Calcium: 7.6 (L)  Alkaline Phosphatase: 46  PROTEIN TOTAL: 6.3 (L)  Albumin: 3.1 (L)  Albumin/Globulin Ratio: 1.0  BILIRUBIN TOTAL: 0.9  AST: 236 (H)  ALT: 73 (H)  Globulin, Total: 3.2  Pertinent Medications Reviewed: reviewed  Pertinent Medications Comments: rocuronium, amidate, quetiapine, levetiracetam, rocephin  Anthropometrics  Temp: 100 °F (37.8 °C)  Height: 6' (182.9 cm)  Height (inches): 72 in  Weight Method: Bed Scale  Weight: 108.1 kg (238 lb 5.1 oz)  Weight (lb): 238.32 lb  Ideal Body Weight (IBW), Male: 178 lb  % Ideal Body Weight, Male (lb): 142.06 %  BMI (Calculated): 32.3     Estimated/Assessed Needs    Total Ve: 7.8 L/m Temp: 100 °F (37.8 °C)Oral  Weight Used For Calorie Calculations: 87.7 kg (193 lb 5.5 oz) (adjusted weight used)   Energy Need Method: Kcal/kg Energy Calorie Requirements (kcal): 3473-4315  Weight Used For Protein Calculations: 87.7 kg (193 lb 5.5 oz)  Protein Requirements:        RDA Method (mL): 2192     Nutrition by Nursing  Diet/Nutrition Received: regular           Last Bowel Movement: 03/21/23               Nutrition Follow-Up  RD Follow-up?: Yes

## 2023-03-23 NOTE — PROCEDURE NOTE ADDENDUM
CTEP for extreme agitation and instability.  Patient is not speaking coherently.  He is unable to follow any train of thought.  Seven people are in the room and cannot control him even in four point restraints.  He is on a precedex infusion for alcohol withdrawal.  He also has some underlying psychiatric issues but has a reported allergy to haldol.      Patient is tachycardic and extremely flushed and warm.  He is not hyperthermic but is at risk of harming himself and others.  He cannot be redirected and is becoming very violent.  Due to active psychosis and alcohol withdrawal decision was made to electively intubate.      RSI with etomidate and rocuronium was given.  Glide scope was used and vocal cords easily visualized.  No secretions noted.  8.0 ETT placed.  CO2 detector changed color appropriately.  Breath sounds auscultated bilaterally.  CXR shows ETT below the clavicles and above the dony.

## 2023-03-23 NOTE — SUBJECTIVE & OBJECTIVE
Past Medical History:   Diagnosis Date    Hypertension     Mixed obsessional thoughts and acts 01/24/2017    Tourette syndrome     Tourette's syndrome 01/24/2017       History reviewed. No pertinent surgical history.    Review of patient's allergies indicates:   Allergen Reactions    Haldol [haloperidol lactate]        No current facility-administered medications on file prior to encounter.     Current Outpatient Medications on File Prior to Encounter   Medication Sig    losartan (COZAAR) 100 MG tablet Take 100 mg by mouth once daily.    multivitamin (ONE DAILY MULTIVITAMIN) per tablet Take 1 tablet by mouth once daily.    omeprazole (PRILOSEC) 40 MG capsule Take 40 mg by mouth once daily.    pimozide (ORAP) 1 mg Tab Take 1 mg by mouth 2 (two) times a day.    thiamine 100 MG tablet Take 100 mg by mouth once daily.    buprenorphine-naloxone 2-0.5 mg (SUBOXONE) 2-0.5 mg Subl Place 8 mg under the tongue every 6 (six) hours as needed.    chlordiazepoxide (LIBRIUM) 10 MG capsule Take 2 capsules (20 mg total) by mouth 3 (three) times daily as needed (tics).    clomiPHENE (CLOMID) 50 mg tablet Take 50 mg by mouth once daily.    clonazePAM (KLONOPIN) 1 MG tablet Take 1 mg by mouth 2 (two) times daily as needed for Anxiety.    clorazepate (TRANXENE) 15 MG tablet Take 1 tablet (15 mg total) by mouth 3 (three) times daily. (Patient taking differently: Take 15 mg by mouth every 6 (six) hours.)    dextroamphetamine-amphetamine 30 mg Tab Take by mouth.    doxepin (SINEQUAN) 25 MG capsule Take 25 mg by mouth every evening.    FLUoxetine 20 MG capsule Take 20 mg by mouth once daily.    guanfacine (TENEX) 2 MG tablet Take 2 mg by mouth every evening.    hydroCHLOROthiazide (HYDRODIURIL) 25 MG tablet Take 25 mg by mouth once daily.    levetiracetam (KEPPRA) 500 MG Tab Take 500 mg by mouth 2 (two) times daily.    lisinopriL (PRINIVIL,ZESTRIL) 20 MG tablet Take 20 mg by mouth once daily.    testosterone cypionate (DEPOTESTOTERONE  CYPIONATE) 100 mg/mL injection Inject 200 mg into the muscle every 14 (fourteen) days.     Family History       Problem Relation (Age of Onset)    OCD Mother, Sister, Daughter    Tics Father, Paternal Uncle, Cousin          Tobacco Use    Smoking status: Never    Smokeless tobacco: Not on file   Substance and Sexual Activity    Alcohol use: Yes     Comment: reports drinking a 5th of alcohol/day    Drug use: Not Currently    Sexual activity: Not Currently     Review of Systems   Reason unable to perform ROS: intubated and sedated.   Objective:     Vital Signs (Most Recent):  Temp: 99.7 °F (37.6 °C) (03/23/23 1230)  Pulse: 79 (03/23/23 1230)  Resp: 15 (03/23/23 1230)  BP: (!) 85/42 (03/23/23 1230)  SpO2: 96 % (03/23/23 1230)   Vital Signs (24h Range):  Temp:  [98.3 °F (36.8 °C)-100.2 °F (37.9 °C)] 99.7 °F (37.6 °C)  Pulse:  [] 79  Resp:  [11-40] 15  SpO2:  [77 %-100 %] 96 %  BP: ()/() 85/42     Weight: 108.1 kg (238 lb 5.1 oz)  Body mass index is 32.32 kg/m².    SpO2: 96 %         Intake/Output Summary (Last 24 hours) at 3/23/2023 1310  Last data filed at 3/23/2023 1200  Gross per 24 hour   Intake 5020.25 ml   Output 1250 ml   Net 3770.25 ml       Lines/Drains/Airways       Drain  Duration                  NG/OG Tube 03/23/23 0014 Warren sump Right nostril <1 day         Urethral Catheter 03/23/23 1000 Non-latex 16 Fr. <1 day              Airway  Duration                  Airway - Non-Surgical 03/22/23 2358 Endotracheal Tube <1 day              Peripheral Intravenous Line  Duration                  Peripheral IV - Single Lumen 03/23/23 0015 20 G Left;Posterior Forearm <1 day         Peripheral IV - Single Lumen 03/23/23 0305 20 G Posterior;Right Hand <1 day         Peripheral IV - Single Lumen 03/23/23 0330 20 G Anterior;Left Forearm <1 day                    Physical Exam  Vitals reviewed.   Constitutional:       Appearance: He is obese. He is ill-appearing.      Interventions: He is sedated and  intubated.   HENT:      Mouth/Throat:      Mouth: Mucous membranes are dry.   Eyes:      General: No scleral icterus.     Pupils: Pupils are equal, round, and reactive to light.   Cardiovascular:      Rate and Rhythm: Normal rate and regular rhythm.      Pulses: Normal pulses.      Heart sounds: Normal heart sounds.   Pulmonary:      Effort: Pulmonary effort is normal. He is intubated.      Breath sounds: Normal breath sounds. No wheezing, rhonchi or rales.   Abdominal:      General: Bowel sounds are normal.      Palpations: Abdomen is soft.   Musculoskeletal:      Right lower leg: No edema.      Left lower leg: No edema.   Skin:     General: Skin is warm and dry.      Coloration: Skin is not jaundiced or pale.       Significant Labs: ABG:   Recent Labs   Lab 03/21/23  1345 03/23/23  0214   PH 7.28* 7.47*   PCO2 47 53*   HCO3 22.1 38.6*   POCSATURATED 99* 97   , Blood Culture: No results for input(s): LABBLOO in the last 48 hours., BMP:   Recent Labs   Lab 03/21/23 2056 03/22/23 0423 03/23/23  0539   GLU  --  104 129*   NA  --  144 142   K  --  3.8 3.9   CL  --  101 96*   CO2  --  35* 35*   BUN  --  38* 33*   CREATININE  --  3.24* 2.40*   CALCIUM  --  7.9* 7.6*   MG 1.3*  --   --    , CMP   Recent Labs   Lab 03/22/23 0423 03/23/23  0539    142   K 3.8 3.9    96*   CO2 35* 35*    129*   BUN 38* 33*   CREATININE 3.24* 2.40*   CALCIUM 7.9* 7.6*   PROT 6.5 6.3*   ALBUMIN 3.4* 3.1*   BILITOT 1.0 0.9   ALKPHOS 49 46   * 236*   ALT 66* 73*   ANIONGAP 12 15   , CBC   Recent Labs   Lab 03/21/23 2056 03/22/23 0423 03/23/23  0539   WBC 10.25 11.12* 9.49   HGB 10.2* 10.2* 9.0*   HCT 29.9* 30.1* 27.2*   PLT 97* 94* 88*   , INR   Recent Labs   Lab 03/21/23  2049   INR 1.09   , Lipid Panel   Recent Labs   Lab 03/21/23  2139   CHOL 188   HDL 48   LDLCALC 111   TRIG 145   CHOLHDL 3.9   , and Troponin No results for input(s): TROPONINI in the last 48 hours.    Significant Imaging: Echocardiogram:  Transthoracic echo (TTE) complete ([facility] Only):   Results for orders placed or performed during the hospital encounter of 03/21/23   Echo   Result Value Ref Range    BSA 2.41 m2    Right Atrial Pressure (from IVC) 15 mmHg    EF 75 %    Left Ventricular Outflow Tract Mean Gradient 4.00 mmHg    AORTIC VALVE CUSP SEPERATION 1.84 cm    LVIDd 4.64 3.5 - 6.0 cm    IVS 1.07 0.6 - 1.1 cm    Posterior Wall 1.08 0.6 - 1.1 cm    Ao root annulus 2.60 cm    LVIDs 2.73 2.1 - 4.0 cm    FS 41 28 - 44 %    IVC ostium 1.8 cm    LV mass 177.94 g    LA size 3.30 cm    RVDD 4.10 cm    TAPSE 2.00 cm    Left Ventricle Relative Wall Thickness 0.47 cm    AV mean gradient 6 mmHg    AV valve area 2.54 cm2    AV Velocity Ratio 0.81     AV index (prosthetic) 1.00     E wave deceleration time 121.00 msec    LVOT diameter 1.80 cm    LVOT area 2.5 cm2    LVOT peak jason 1.3 m/s    LVOT peak VTI 19.00 cm    Ao peak jason 1.6 m/s    Ao VTI 19.00 cm    LVOT stroke volume 48.32 cm3    AV peak gradient 10 mmHg    TV rest pulmonary artery pressure 46 mmHg    MV Peak E Jason 0.93 m/s    TR Max Jason 2.80 m/s    LV Systolic Volume 27.80 mL    LV Systolic Volume Index 11.8 mL/m2    LV Diastolic Volume 99.30 mL    LV Diastolic Volume Index 42.26 mL/m2    LV Mass Index 76 g/m2    Echo EF Estimated 65 %    RA Major Axis 4.10 cm    Triscuspid Valve Regurgitation Peak Gradient 31 mmHg    Narrative    · The left ventricle is normal in size with hyperdynamic systolic   function.  · The estimated ejection fraction is 75%.  · Normal left ventricular diastolic function.  · Mild right ventricular enlargement.  · Mild right atrial enlargement.  · Mild tricuspid regurgitation.  · Elevated central venous pressure (15 mmHg).  · The estimated PA systolic pressure is 46 mmHg.  · There is pulmonary hypertension.  · Sinus rhythm, 120.  · Trivial pericardial effusion.        , EKG:   Results for orders placed or performed during the hospital encounter of 03/21/23   EKG 12-lead     Collection Time: 03/22/23 11:18 AM    Narrative    Test Reason : R77.8,    Vent. Rate : 110 BPM     Atrial Rate : 110 BPM     P-R Int : 120 ms          QRS Dur : 078 ms      QT Int : 346 ms       P-R-T Axes : 074 051 049 degrees     QTc Int : 468 ms    Sinus tachycardia  Otherwise normal ECG  No previous ECGs available  Confirmed by Ramon MARQUEZ, Des PEÑA ([id number]) on 3/23/2023 4:20:01 AM    Referred By: [email or web address]   SELF           Confirmed By:Des Navarro MD      , and X-Ray: CXR: X-Ray Chest 1 View (CXR):   Results for orders placed or performed during the hospital encounter of 03/21/23   X-Ray Chest 1 View    Narrative    EXAMINATION:  XR CHEST 1 VIEW    CLINICAL HISTORY:  sirs;    COMPARISON:  None available    FINDINGS:  The cardiomediastinal silhouette is within normal limits. Lungs are predominantly clear.  There is moderate elevation of the right hemidiaphragm with minimal right basilar opacities suggestive of passive atelectatic changes.  There is no pneumothorax or pleural effusion.    There is no acute osseous or soft tissue abnormality.      Impression    No acute cardiopulmonary process.  Probable minimal right basilar atelectatic changes as detailed above.    Place of service: [facility]      Electronically signed by: Emmanuel Scott  Date:    03/21/2023  Time:    19:56

## 2023-03-23 NOTE — PROGRESS NOTES
[facility]  Pulmonology  Progress Note    Patient Name: Carol Ladd Jr.  MRN: [id number]  Admission Date: 3/21/2023  Hospital Length of Stay: 2 days  Code Status: No Order  Attending Provider: Des Navarro MD  Primary Care Provider: Raymundo Cabrera MD   Principal Problem: Alcohol withdrawal syndrome with complication    Subjective:     Interval History:  Patient without complaints sedated      Objective:     Vital Signs (Most Recent):  Temp: 100.2 °F (37.9 °C) (03/23/23 0308)  Pulse: 82 (03/23/23 0530)  Resp: 14 (03/23/23 0530)  BP: (!) 91/44 (03/23/23 0530)  SpO2: 97 % (03/23/23 0530)   Vital Signs (24h Range):  Temp:  [98.3 °F (36.8 °C)-100.2 °F (37.9 °C)] 100.2 °F (37.9 °C)  Pulse:  [] 82  Resp:  [10-40] 14  SpO2:  [84 %-100 %] 97 %  BP: ()/() 91/44     Weight: 108.1 kg (238 lb 5.1 oz)  Body mass index is 32.32 kg/m².      Intake/Output Summary (Last 24 hours) at 3/23/2023 0558  Last data filed at 3/23/2023 0300  Gross per 24 hour   Intake 4636.5 ml   Output 1500 ml   Net 3136.5 ml       Physical Exam  Vitals reviewed.   Constitutional:       Appearance: Normal appearance.      Interventions: He is not intubated.  HENT:      Head: Normocephalic and atraumatic.      Nose: Nose normal.      Mouth/Throat:      Mouth: Mucous membranes are dry.      Pharynx: Oropharynx is clear.   Eyes:      Extraocular Movements: Extraocular movements intact.      Conjunctiva/sclera: Conjunctivae normal.      Pupils: Pupils are equal, round, and reactive to light.   Cardiovascular:      Rate and Rhythm: Normal rate.      Heart sounds: Normal heart sounds. No murmur heard.  Pulmonary:      Effort: Pulmonary effort is normal. He is not intubated.      Breath sounds: Normal breath sounds.   Abdominal:      General: Abdomen is flat. Bowel sounds are normal.      Palpations: Abdomen is soft.   Musculoskeletal:         General: Normal range of motion.      Cervical back: Normal range of  motion and neck supple.      Right lower leg: No edema.      Left lower leg: No edema.   Skin:     General: Skin is warm and dry.      Capillary Refill: Capillary refill takes less than 2 seconds.   Neurological:      General: No focal deficit present.      Mental Status: He is alert and oriented to person, place, and time.   Psychiatric:         Mood and Affect: Mood normal.         Behavior: Behavior normal.     Review of Systems    Vents:  Vent Mode: A/C (03/23/23 0000)  Ventilator Initiated: Yes (03/23/23 0000)  Set Rate: 500 BPM (03/23/23 0000)  Vt Set: 5 mL (03/23/23 0000)  Oxygen Concentration (%): 40 (03/23/23 0000)  Peak Airway Pressure: 21 cmH20 (03/23/23 0000)  Total Ve: 5.9 L/m (03/23/23 0000)  F/VT Ratio<105 (RSBI): (!) 33.33 (03/23/23 0000)    Lines/Drains/Airways       Drain  Duration                  NG/OG Tube 03/23/23 0014 Levy sump Right nostril <1 day              Airway  Duration                  Airway - Non-Surgical 03/22/23 2358 Endotracheal Tube <1 day              Peripheral Intravenous Line  Duration                  Peripheral IV - Single Lumen 03/23/23 0015 20 G Left;Posterior Forearm <1 day                    Significant Labs:    CBC/Anemia Profile:  Recent Labs   Lab 03/21/23  1237 03/21/23  1345 03/21/23 2056 03/21/23  2139 03/22/23  0423   WBC 16.06*  --  10.25  --  11.12*   HGB 12.1*  --  10.2*  --  10.2*   HCT 36.6* 33* 29.9*  --  30.1*   *  --  97*  --  94*   .7*  --  103.5*  --  103.4*   RDW 15.0*  --  15.0*  --  15.0*   FOLATE  --   --   --  >20.0*  --    [id number]  --   --   --  232  --         Chemistries:  Recent Labs   Lab 03/21/23  1237 03/21/23 2056 03/22/23  0423     --  144   K 3.9  --  3.8   CL 98  --  101   CO2 18*  --  35*   BUN 38*  --  38*   CREATININE 4.37*  --  3.24*   CALCIUM 8.8  --  7.9*   ALBUMIN 3.9  --  3.4*   PROT 7.0  --  6.5   BILITOT 0.8  --  1.0   ALKPHOS 60  --  49   ALT 68*  --  66*   *  --  182*   MG  --  1.3*  --         Recent Lab Results  (Last 5 results in the past 24 hours)        03/23/23  0214   03/23/23  0052   03/22/23  1713   03/22/23  1107   03/22/23  1046        aPTT   44.4   45.8     52.7       Lactate, [place]               POC Base Excess 12.9               POC HCO3 38.6               POC PCO2 53               POC PH 7.47               POC PO2 85               POC SATURATED O2 97               Troponin I High Sensitivity       1,229.7                                Significant Imaging:  I have reviewed all pertinent imaging results/findings within the past 24 hours.    Assessment/Plan:     Neuro  Encephalopathy, toxic  This is mainly due to withdrawal watching carefully    Tourette syndrome  Currently stable with sedation his tics are completely gone    Psychiatric  * Alcohol withdrawal syndrome with complication  Is seem to be worse last night tried Precedex did not work ended up having to be intubated due to agitation and respiratory failure    Depression  Prozac and Doxepin on home medication list but states he was not taking these..   Was also started on those medications along  with tranxene, trazodone and Zyprexa at [place] over the last 2 days  -- Patient is likely starting to have withdraws from alcohol and subaxone; however, the combination of prozac and doxepin and trazadone all increase the risk of serotonin syndrome .. given this concern serotonin syndrome should be in the differential as well     Will watch          Cardiac/Vascular  HTN (hypertension)  Blood pressure is better with sedation    Renal/  Metabolic acidosis  This is resolved will see what CO2 is today    Acute kidney injury  Lab is pending today urine output is up waiting lab      Orthopedic  Rhabdomyolysis  CPK pending today continue bicarb infusion    Other  Obstructive sleep apnea  Noted use BiPAP at night                 Des Navarro MD  Pulmonology  [facility]

## 2023-03-23 NOTE — ASSESSMENT & PLAN NOTE
- CK 11,939 today; Creatinine improving 2.4 from 4.3 on admit  - Being followed by primary care team

## 2023-03-23 NOTE — ASSESSMENT & PLAN NOTE
Is seem to be worse last night tried Precedex did not work ended up having to be intubated due to agitation and respiratory failure

## 2023-03-23 NOTE — CONSULTS
[facility]  Cardiology  Consult Note    Patient Name: Carol Ladd Jr.  MRN: [id number]  Admission Date: 3/21/2023  Hospital Length of Stay: 2 days  Code Status: No Order   Attending Provider: Des Navarro MD   Consulting Provider: JILLIAN Lo  Primary Care Physician: Raymundo Cabrera MD  Principal Problem:Alcohol withdrawal syndrome with complication    Patient information was obtained from ER records and primary team.     Inpatient consult to Cardiology  Consult performed by: JILLIAN Lo  Consult ordered by: [facility]  Reason for consult: elevated troponin        Subjective:     Chief Complaint:  ETOH detox     HPI:   48 y/o male with PMH of HTN, ODD, tourett's syndrome, and opioid/alcohol abuse who was transferred to [facility] for increased agitation from [place]. He was admitted there 48 hours ago for ETOH detox. He drinks over 1/5 of alcohol daily and has for several years to help with his tourette's.  Family states that it helps better than medication. He has also been on Subaxone for several years as well.  His last drink was 03/19. ETOH level was over 400 at [facility] [place].    On arrival to the ER his HR was 160 and he was diaphoretic.  Lactic acid was 10 on ABG and CK 3,333 and Cr 4.38. He was treated with 3 liters NS. He then became hypotensive and was started on Levophed. On exam he is lethargic due to receiving 6 mg IV ativan but he states that his muscle spasm/contractions are similar to his tics but much worse.  He was started on several psych medications at [facility] but was not taking any at home.     Due to active psychosis and alcohol withdrawal, decision was made to electively intubate overnight. Troponin elevated 873, 808, 1229 and cardiology consulted.     Patient seen today, 3/23/2023, currently intubated and sedated.            Past Medical History:   Diagnosis Date    Hypertension     Mixed obsessional thoughts and acts  01/24/2017    Tourette syndrome     Tourette's syndrome 01/24/2017       History reviewed. No pertinent surgical history.    Review of patient's allergies indicates:   Allergen Reactions    Haldol [haloperidol lactate]        No current facility-administered medications on file prior to encounter.     Current Outpatient Medications on File Prior to Encounter   Medication Sig    losartan (COZAAR) 100 MG tablet Take 100 mg by mouth once daily.    multivitamin (ONE DAILY MULTIVITAMIN) per tablet Take 1 tablet by mouth once daily.    omeprazole (PRILOSEC) 40 MG capsule Take 40 mg by mouth once daily.    pimozide (ORAP) 1 mg Tab Take 1 mg by mouth 2 (two) times a day.    thiamine 100 MG tablet Take 100 mg by mouth once daily.    buprenorphine-naloxone 2-0.5 mg (SUBOXONE) 2-0.5 mg Subl Place 8 mg under the tongue every 6 (six) hours as needed.    chlordiazepoxide (LIBRIUM) 10 MG capsule Take 2 capsules (20 mg total) by mouth 3 (three) times daily as needed (tics).    clomiPHENE (CLOMID) 50 mg tablet Take 50 mg by mouth once daily.    clonazePAM (KLONOPIN) 1 MG tablet Take 1 mg by mouth 2 (two) times daily as needed for Anxiety.    clorazepate (TRANXENE) 15 MG tablet Take 1 tablet (15 mg total) by mouth 3 (three) times daily. (Patient taking differently: Take 15 mg by mouth every 6 (six) hours.)    dextroamphetamine-amphetamine 30 mg Tab Take by mouth.    doxepin (SINEQUAN) 25 MG capsule Take 25 mg by mouth every evening.    FLUoxetine 20 MG capsule Take 20 mg by mouth once daily.    guanfacine (TENEX) 2 MG tablet Take 2 mg by mouth every evening.    hydroCHLOROthiazide (HYDRODIURIL) 25 MG tablet Take 25 mg by mouth once daily.    levetiracetam (KEPPRA) 500 MG Tab Take 500 mg by mouth 2 (two) times daily.    lisinopriL (PRINIVIL,ZESTRIL) 20 MG tablet Take 20 mg by mouth once daily.    testosterone cypionate (DEPOTESTOTERONE CYPIONATE) 100 mg/mL injection Inject 200 mg into the muscle every 14  (fourteen) days.     Family History       Problem Relation (Age of Onset)    OCD Mother, Sister, Daughter    Tics Father, Paternal Uncle, Cousin          Tobacco Use    Smoking status: Never    Smokeless tobacco: Not on file   Substance and Sexual Activity    Alcohol use: Yes     Comment: reports drinking a 5th of alcohol/day    Drug use: Not Currently    Sexual activity: Not Currently     Review of Systems   Reason unable to perform ROS: intubated and sedated.   Objective:     Vital Signs (Most Recent):  Temp: 99.7 °F (37.6 °C) (03/23/23 1230)  Pulse: 79 (03/23/23 1230)  Resp: 15 (03/23/23 1230)  BP: (!) 85/42 (03/23/23 1230)  SpO2: 96 % (03/23/23 1230)   Vital Signs (24h Range):  Temp:  [98.3 °F (36.8 °C)-100.2 °F (37.9 °C)] 99.7 °F (37.6 °C)  Pulse:  [] 79  Resp:  [11-40] 15  SpO2:  [77 %-100 %] 96 %  BP: ()/() 85/42     Weight: 108.1 kg (238 lb 5.1 oz)  Body mass index is 32.32 kg/m².    SpO2: 96 %         Intake/Output Summary (Last 24 hours) at 3/23/2023 1310  Last data filed at 3/23/2023 1200  Gross per 24 hour   Intake 5020.25 ml   Output 1250 ml   Net 3770.25 ml       Lines/Drains/Airways       Drain  Duration                  NG/OG Tube 03/23/23 0014 [place] sump Right nostril <1 day         Urethral Catheter 03/23/23 1000 Non-latex 16 Fr. <1 day              Airway  Duration                  Airway - Non-Surgical 03/22/23 2358 Endotracheal Tube <1 day              Peripheral Intravenous Line  Duration                  Peripheral IV - Single Lumen 03/23/23 0015 20 G Left;Posterior Forearm <1 day         Peripheral IV - Single Lumen 03/23/23 0305 20 G Posterior;Right Hand <1 day         Peripheral IV - Single Lumen 03/23/23 0330 20 G Anterior;Left Forearm <1 day                    Physical Exam  Vitals reviewed.   Constitutional:       Appearance: He is obese. He is ill-appearing.      Interventions: He is sedated and intubated.   HENT:      Mouth/Throat:      Mouth: Mucous membranes  are dry.   Eyes:      General: No scleral icterus.     Pupils: Pupils are equal, round, and reactive to light.   Cardiovascular:      Rate and Rhythm: Normal rate and regular rhythm.      Pulses: Normal pulses.      Heart sounds: Normal heart sounds.   Pulmonary:      Effort: Pulmonary effort is normal. He is intubated.      Breath sounds: Normal breath sounds. No wheezing, rhonchi or rales.   Abdominal:      General: Bowel sounds are normal.      Palpations: Abdomen is soft.   Musculoskeletal:      Right lower leg: No edema.      Left lower leg: No edema.   Skin:     General: Skin is warm and dry.      Coloration: Skin is not jaundiced or pale.       Significant Labs: ABG:   Recent Labs   Lab 03/21/23  1345 03/23/23  0214   PH 7.28* 7.47*   PCO2 47 53*   HCO3 22.1 38.6*   POCSATURATED 99* 97   , Blood Culture: No results for input(s): LABBLOO in the last 48 hours., BMP:   Recent Labs   Lab 03/21/23 2056 03/22/23 0423 03/23/23  0539   GLU  --  104 129*   NA  --  144 142   K  --  3.8 3.9   CL  --  101 96*   CO2  --  35* 35*   BUN  --  38* 33*   CREATININE  --  3.24* 2.40*   CALCIUM  --  7.9* 7.6*   MG 1.3*  --   --    , CMP   Recent Labs   Lab 03/22/23 0423 03/23/23  0539    142   K 3.8 3.9    96*   CO2 35* 35*    129*   BUN 38* 33*   CREATININE 3.24* 2.40*   CALCIUM 7.9* 7.6*   PROT 6.5 6.3*   ALBUMIN 3.4* 3.1*   BILITOT 1.0 0.9   ALKPHOS 49 46   * 236*   ALT 66* 73*   ANIONGAP 12 15   , CBC   Recent Labs   Lab 03/21/23 2056 03/22/23 0423 03/23/23  0539   WBC 10.25 11.12* 9.49   HGB 10.2* 10.2* 9.0*   HCT 29.9* 30.1* 27.2*   PLT 97* 94* 88*   , INR   Recent Labs   Lab 03/21/23 2049   INR 1.09   , Lipid Panel   Recent Labs   Lab 03/21/23  2139   CHOL 188   HDL 48   LDLCALC 111   TRIG 145   CHOLHDL 3.9   , and Troponin No results for input(s): TROPONINI in the last 48 hours.    Significant Imaging: Echocardiogram: Transthoracic echo (TTE) complete (Cupid Only):   Results for orders  placed or performed during the hospital encounter of 03/21/23   Echo   Result Value Ref Range    BSA 2.41 m2    Right Atrial Pressure (from IVC) 15 mmHg    EF 75 %    Left Ventricular Outflow Tract Mean Gradient 4.00 mmHg    AORTIC VALVE CUSP SEPERATION 1.84 cm    LVIDd 4.64 3.5 - 6.0 cm    IVS 1.07 0.6 - 1.1 cm    Posterior Wall 1.08 0.6 - 1.1 cm    Ao root annulus 2.60 cm    LVIDs 2.73 2.1 - 4.0 cm    FS 41 28 - 44 %    IVC ostium 1.8 cm    LV mass 177.94 g    LA size 3.30 cm    RVDD 4.10 cm    TAPSE 2.00 cm    Left Ventricle Relative Wall Thickness 0.47 cm    AV mean gradient 6 mmHg    AV valve area 2.54 cm2    AV Velocity Ratio 0.81     AV index (prosthetic) 1.00     E wave deceleration time 121.00 msec    LVOT diameter 1.80 cm    LVOT area 2.5 cm2    LVOT peak jason 1.3 m/s    LVOT peak VTI 19.00 cm    Ao peak jason 1.6 m/s    Ao VTI 19.00 cm    LVOT stroke volume 48.32 cm3    AV peak gradient 10 mmHg    TV rest pulmonary artery pressure 46 mmHg    MV Peak E Jason 0.93 m/s    TR Max Jason 2.80 m/s    LV Systolic Volume 27.80 mL    LV Systolic Volume Index 11.8 mL/m2    LV Diastolic Volume 99.30 mL    LV Diastolic Volume Index 42.26 mL/m2    LV Mass Index 76 g/m2    Echo EF Estimated 65 %    RA Major Axis 4.10 cm    Triscuspid Valve Regurgitation Peak Gradient 31 mmHg    Narrative    · The left ventricle is normal in size with hyperdynamic systolic   function.  · The estimated ejection fraction is 75%.  · Normal left ventricular diastolic function.  · Mild right ventricular enlargement.  · Mild right atrial enlargement.  · Mild tricuspid regurgitation.  · Elevated central venous pressure (15 mmHg).  · The estimated PA systolic pressure is 46 mmHg.  · There is pulmonary hypertension.  · Sinus rhythm, 120.  · Trivial pericardial effusion.        , EKG:   Results for orders placed or performed during the hospital encounter of 03/21/23   EKG 12-lead    Collection Time: 03/22/23 11:18 AM    Narrative    Test Reason :  R77.8,    Vent. Rate : 110 BPM     Atrial Rate : 110 BPM     P-R Int : 120 ms          QRS Dur : 078 ms      QT Int : 346 ms       P-R-T Axes : 074 051 049 degrees     QTc Int : 468 ms    Sinus tachycardia  Otherwise normal ECG  No previous ECGs available  Confirmed by Ramon MARQUEZ, Des PEÑA ([id number]) on 3/23/2023 4:20:01 AM    Referred By: [email or web address]   SELF           Confirmed By:Des Navarro MD      , and X-Ray: CXR: X-Ray Chest 1 View (CXR):   Results for orders placed or performed during the hospital encounter of 03/21/23   X-Ray Chest 1 View    Narrative    EXAMINATION:  XR CHEST 1 VIEW    CLINICAL HISTORY:  sirs;    COMPARISON:  None available    FINDINGS:  The cardiomediastinal silhouette is within normal limits. Lungs are predominantly clear.  There is moderate elevation of the right hemidiaphragm with minimal right basilar opacities suggestive of passive atelectatic changes.  There is no pneumothorax or pleural effusion.    There is no acute osseous or soft tissue abnormality.      Impression    No acute cardiopulmonary process.  Probable minimal right basilar atelectatic changes as detailed above.    Place of service: [facility]      Electronically signed by: Emmanuel Scott  Date:    03/21/2023  Time:    19:56     Assessment and Plan:     * Alcohol withdrawal syndrome with complication  - Being followed by critical care team    Elevated troponin level not due myocardial infarction  - Patient seen and evaluated by Dr. Avelar  - Troponin 573, 808, 1229, 679; EKG ST rate 110, no acute ischemic changes  - Echo 3/22/2023 with hyperdynamic systolic function, LVEF 75%. normal LV diastolic function, mild RV & RA enlargement, mild TR, pulm HTN, PASP 64mmHg  - Non-MI troponin elevation in setting of severe rhabdomyolysis, continue medical management   - No further cardiac workup needed at this time.  - Follow up in clinic with cardiology for outpatient ischemic evaluation  - Cardiology will sign  off at this time, please call if any further assistance is needed      Rhabdomyolysis  - CK 11,939 today; Creatinine improving 2.4 from 4.3 on admit  - Being followed by primary care team        VTE Risk Mitigation (From admission, onward)         Ordered     heparin 25,000 units in dextrose 5% 250 mL (100 units/mL) infusion LOW INTENSITY nomogram - [facility]  Continuous        Question:  Begin at (in units/kg/hr)  Answer:  12    03/21/23 2012     heparin 25,000 units in dextrose 5% (100 units/ml) IV bolus from bag ADDITIONAL PRN BOLUS - 60 units/kg (max bolus 4000 units)  As needed (PRN)        Question:  Heparin Infusion Adjustment (DO NOT MODIFY ANSWER)  Answer:  \\[email or web address]\epic\Images\Pharmacy\HeparinInfusions\heparin LOW INTENSITY nomogram for [place] [id number].pdf    03/21/23 2012     heparin 25,000 units in dextrose 5% (100 units/ml) IV bolus from bag - ADDITIONAL PRN BOLUS - 30 units/kg (max bolus 4000 units)  As needed (PRN)        Question:  Heparin Infusion Adjustment (DO NOT MODIFY ANSWER)  Answer:  \\[email or web address]\epic\Images\Pharmacy\HeparinInfusions\heparin LOW INTENSITY nomogram for [place] [id number].pdf    03/21/23 2012                Thank you for your consult. I will sign off. Please contact us if you have any additional questions.    Ashlie Medeiros, YARIP  Cardiology   [facility]

## 2023-03-24 PROBLEM — R50.9 FEVER: Status: ACTIVE | Noted: 2023-03-24

## 2023-03-24 LAB
ALBUMIN SERPL BCP-MCNC: 3.1 G/DL (ref 3.5–5)
ALBUMIN/GLOB SERPL: 0.9 {RATIO}
ALP SERPL-CCNC: 45 U/L (ref 45–115)
ALT SERPL W P-5'-P-CCNC: 67 U/L (ref 16–61)
ANION GAP SERPL CALCULATED.3IONS-SCNC: 11 MMOL/L (ref 7–16)
AST SERPL W P-5'-P-CCNC: 141 U/L (ref 15–37)
BASOPHILS # BLD AUTO: 0.06 K/UL (ref 0–0.2)
BASOPHILS NFR BLD AUTO: 0.6 % (ref 0–1)
BILIRUB SERPL-MCNC: 1 MG/DL (ref ?–1.2)
BUN SERPL-MCNC: 19 MG/DL (ref 7–18)
BUN/CREAT SERPL: 10 (ref 6–20)
CALCIUM SERPL-MCNC: 7.4 MG/DL (ref 8.5–10.1)
CHLORIDE SERPL-SCNC: 96 MMOL/L (ref 98–107)
CK SERPL-CCNC: 5734 U/L (ref 39–308)
CO2 SERPL-SCNC: 38 MMOL/L (ref 21–32)
CREAT SERPL-MCNC: 1.82 MG/DL (ref 0.7–1.3)
DIFFERENTIAL METHOD BLD: ABNORMAL
EGFR (NO RACE VARIABLE) (RUSH/TITUS): 46 ML/MIN/1.73M²
EOSINOPHIL # BLD AUTO: 0.29 K/UL (ref 0–0.5)
EOSINOPHIL NFR BLD AUTO: 2.7 % (ref 1–4)
ERYTHROCYTE [DISTWIDTH] IN BLOOD BY AUTOMATED COUNT: 15.2 % (ref 11.5–14.5)
GLOBULIN SER-MCNC: 3.4 G/DL (ref 2–4)
GLUCOSE SERPL-MCNC: 142 MG/DL (ref 74–106)
HCT VFR BLD AUTO: 27.1 % (ref 40–54)
HGB BLD-MCNC: 9.1 G/DL (ref 13.5–18)
IMM GRANULOCYTES # BLD AUTO: 0.06 K/UL (ref 0–0.04)
IMM GRANULOCYTES NFR BLD: 0.6 % (ref 0–0.4)
LYMPHOCYTES # BLD AUTO: 0.77 K/UL (ref 1–4.8)
LYMPHOCYTES NFR BLD AUTO: 7.1 % (ref 27–41)
MCH RBC QN AUTO: 35.3 PG (ref 27–31)
MCHC RBC AUTO-ENTMCNC: 33.6 G/DL (ref 32–36)
MCV RBC AUTO: 105 FL (ref 80–96)
MONOCYTES # BLD AUTO: 0.87 K/UL (ref 0–0.8)
MONOCYTES NFR BLD AUTO: 8 % (ref 2–6)
MPC BLD CALC-MCNC: 10 FL (ref 9.4–12.4)
NEUTROPHILS # BLD AUTO: 8.83 K/UL (ref 1.8–7.7)
NEUTROPHILS NFR BLD AUTO: 81 % (ref 53–65)
NRBC # BLD AUTO: 0 X10E3/UL
NRBC, AUTO (.00): 0 %
PLATELET # BLD AUTO: 96 K/UL (ref 150–400)
POTASSIUM SERPL-SCNC: 3.5 MMOL/L (ref 3.5–5.1)
PROT SERPL-MCNC: 6.5 G/DL (ref 6.4–8.2)
RBC # BLD AUTO: 2.58 M/UL (ref 4.6–6.2)
SODIUM SERPL-SCNC: 141 MMOL/L (ref 136–145)
WBC # BLD AUTO: 10.88 K/UL (ref 4.5–11)

## 2023-03-24 PROCEDURE — 25000003 PHARM REV CODE 250: Performed by: NURSE PRACTITIONER

## 2023-03-24 PROCEDURE — 99233 PR SUBSEQUENT HOSPITAL CARE,LEVL III: ICD-10-PCS | Mod: ,,, | Performed by: INTERNAL MEDICINE

## 2023-03-24 PROCEDURE — 25000003 PHARM REV CODE 250: Performed by: INTERNAL MEDICINE

## 2023-03-24 PROCEDURE — 99900026 HC AIRWAY MAINTENANCE (STAT)

## 2023-03-24 PROCEDURE — 94003 VENT MGMT INPAT SUBQ DAY: CPT

## 2023-03-24 PROCEDURE — 80053 COMPREHEN METABOLIC PANEL: CPT | Performed by: NURSE PRACTITIONER

## 2023-03-24 PROCEDURE — 20000000 HC ICU ROOM

## 2023-03-24 PROCEDURE — 99900035 HC TECH TIME PER 15 MIN (STAT)

## 2023-03-24 PROCEDURE — 25000003 PHARM REV CODE 250: Performed by: EMERGENCY MEDICINE

## 2023-03-24 PROCEDURE — 99233 SBSQ HOSP IP/OBS HIGH 50: CPT | Mod: ,,, | Performed by: INTERNAL MEDICINE

## 2023-03-24 PROCEDURE — 94761 N-INVAS EAR/PLS OXIMETRY MLT: CPT

## 2023-03-24 PROCEDURE — 27000221 HC OXYGEN, UP TO 24 HOURS

## 2023-03-24 PROCEDURE — 85025 COMPLETE CBC W/AUTO DIFF WBC: CPT | Performed by: NURSE PRACTITIONER

## 2023-03-24 PROCEDURE — 63600175 PHARM REV CODE 636 W HCPCS: Performed by: NURSE PRACTITIONER

## 2023-03-24 PROCEDURE — 82550 ASSAY OF CK (CPK): CPT | Performed by: INTERNAL MEDICINE

## 2023-03-24 PROCEDURE — 63600175 PHARM REV CODE 636 W HCPCS: Performed by: HOSPITALIST

## 2023-03-24 PROCEDURE — 25000003 PHARM REV CODE 250: Performed by: HOSPITALIST

## 2023-03-24 PROCEDURE — 27200966 HC CLOSED SUCTION SYSTEM

## 2023-03-24 RX ORDER — ACETAMINOPHEN 500 MG
1000 TABLET ORAL EVERY 6 HOURS PRN
Status: DISCONTINUED | OUTPATIENT
Start: 2023-03-24 | End: 2023-04-10 | Stop reason: HOSPADM

## 2023-03-24 RX ORDER — QUETIAPINE FUMARATE 100 MG/1
100 TABLET, FILM COATED ORAL 2 TIMES DAILY
Status: DISCONTINUED | OUTPATIENT
Start: 2023-03-24 | End: 2023-03-24

## 2023-03-24 RX ADMIN — PROPOFOL 50 MCG/KG/MIN: 10 INJECTION, EMULSION INTRAVENOUS at 02:03

## 2023-03-24 RX ADMIN — MUPIROCIN: 20 OINTMENT TOPICAL at 08:03

## 2023-03-24 RX ADMIN — SODIUM BICARBONATE: 84 INJECTION, SOLUTION INTRAVENOUS at 12:03

## 2023-03-24 RX ADMIN — LEVETIRACETAM 500 MG: 500 TABLET, FILM COATED ORAL at 08:03

## 2023-03-24 RX ADMIN — NOREPINEPHRINE BITARTRATE 0.06 MCG/KG/MIN: 4 INJECTION, SOLUTION INTRAVENOUS at 11:03

## 2023-03-24 RX ADMIN — SODIUM BICARBONATE: 84 INJECTION, SOLUTION INTRAVENOUS at 04:03

## 2023-03-24 RX ADMIN — SODIUM BICARBONATE: 84 INJECTION, SOLUTION INTRAVENOUS at 08:03

## 2023-03-24 RX ADMIN — LORAZEPAM 2 MG: 2 INJECTION INTRAMUSCULAR; INTRAVENOUS at 03:03

## 2023-03-24 RX ADMIN — PROPOFOL 50 MCG/KG/MIN: 10 INJECTION, EMULSION INTRAVENOUS at 11:03

## 2023-03-24 RX ADMIN — PROPOFOL 50 MCG/KG/MIN: 10 INJECTION, EMULSION INTRAVENOUS at 09:03

## 2023-03-24 RX ADMIN — CEFTRIAXONE SODIUM 1 G: 1 INJECTION, POWDER, FOR SOLUTION INTRAMUSCULAR; INTRAVENOUS at 03:03

## 2023-03-24 RX ADMIN — NOREPINEPHRINE BITARTRATE 0.04 MCG/KG/MIN: 4 INJECTION, SOLUTION INTRAVENOUS at 02:03

## 2023-03-24 RX ADMIN — ASPIRIN 81 MG: 81 TABLET, DELAYED RELEASE ORAL at 08:03

## 2023-03-24 RX ADMIN — LORAZEPAM 2 MG: 2 INJECTION INTRAMUSCULAR; INTRAVENOUS at 08:03

## 2023-03-24 RX ADMIN — LORAZEPAM 2 MG: 2 INJECTION INTRAMUSCULAR; INTRAVENOUS at 09:03

## 2023-03-24 RX ADMIN — DEXMEDETOMIDINE HYDROCHLORIDE 1.2 MCG/KG/HR: 100 INJECTION, SOLUTION, CONCENTRATE INTRAVENOUS at 09:03

## 2023-03-24 RX ADMIN — PROPOFOL 50 MCG/KG/MIN: 10 INJECTION, EMULSION INTRAVENOUS at 04:03

## 2023-03-24 RX ADMIN — LORAZEPAM 2 MG: 2 INJECTION INTRAMUSCULAR; INTRAVENOUS at 05:03

## 2023-03-24 RX ADMIN — ACETAMINOPHEN 1000 MG: 500 TABLET ORAL at 05:03

## 2023-03-24 RX ADMIN — PROPOFOL 50 MCG/KG/MIN: 10 INJECTION, EMULSION INTRAVENOUS at 06:03

## 2023-03-24 RX ADMIN — DEXMEDETOMIDINE HYDROCHLORIDE 1.4 MCG/KG/HR: 100 INJECTION, SOLUTION, CONCENTRATE INTRAVENOUS at 04:03

## 2023-03-24 RX ADMIN — NOREPINEPHRINE BITARTRATE 0.1 MCG/KG/MIN: 4 INJECTION, SOLUTION INTRAVENOUS at 04:03

## 2023-03-24 RX ADMIN — FOLIC ACID: 5 INJECTION, SOLUTION INTRAMUSCULAR; INTRAVENOUS; SUBCUTANEOUS at 08:03

## 2023-03-24 RX ADMIN — DEXMEDETOMIDINE HYDROCHLORIDE 1.4 MCG/KG/HR: 100 INJECTION, SOLUTION, CONCENTRATE INTRAVENOUS at 06:03

## 2023-03-24 RX ADMIN — LORAZEPAM 2 MG: 2 INJECTION INTRAMUSCULAR; INTRAVENOUS at 12:03

## 2023-03-24 RX ADMIN — PROPOFOL 50 MCG/KG/MIN: 10 INJECTION, EMULSION INTRAVENOUS at 08:03

## 2023-03-24 RX ADMIN — FENTANYL CITRATE 50 MCG/HR: 50 INJECTION, SOLUTION INTRAMUSCULAR; INTRAVENOUS at 08:03

## 2023-03-24 NOTE — ASSESSMENT & PLAN NOTE
Uncertain of the cause but certainly will stop any medicines that looks like it could be due to antipsychotics we stopped all that use Ativan we may consider dantrolene if it continues

## 2023-03-24 NOTE — SUBJECTIVE & OBJECTIVE
Interval History: Patient sedated      Objective:     Vital Signs (Most Recent):  Temp: (!) 102.7 °F (39.3 °C) (03/24/23 0815)  Pulse: 100 (03/24/23 0815)  Resp: 13 (03/24/23 0815)  BP: (!) 50/16 (03/24/23 0815)  SpO2: 98 % (03/24/23 0815)   Vital Signs (24h Range):  Temp:  [98.2 °F (36.8 °C)-102.7 °F (39.3 °C)] 102.7 °F (39.3 °C)  Pulse:  [] 100  Resp:  [11-21] 13  SpO2:  [93 %-100 %] 98 %  BP: ()/(16-92) 50/16     Weight: 115 kg (253 lb 8.5 oz)  Body mass index is 34.38 kg/m².      Intake/Output Summary (Last 24 hours) at 3/24/2023 0833  Last data filed at 3/24/2023 0800  Gross per 24 hour   Intake 6608.23 ml   Output 5125 ml   Net 1483.23 ml       Physical Exam  Vitals reviewed.   Constitutional:       Appearance: Normal appearance.      Interventions: He is not intubated.  HENT:      Head: Normocephalic and atraumatic.      Nose: Nose normal.      Mouth/Throat:      Mouth: Mucous membranes are dry.      Pharynx: Oropharynx is clear.   Eyes:      Extraocular Movements: Extraocular movements intact.      Conjunctiva/sclera: Conjunctivae normal.      Pupils: Pupils are equal, round, and reactive to light.   Cardiovascular:      Rate and Rhythm: Normal rate.      Heart sounds: Normal heart sounds. No murmur heard.  Pulmonary:      Effort: Pulmonary effort is normal. He is not intubated.      Breath sounds: Normal breath sounds.   Abdominal:      General: Abdomen is flat. Bowel sounds are normal.      Palpations: Abdomen is soft.   Musculoskeletal:         General: Normal range of motion.      Cervical back: Normal range of motion and neck supple.      Right lower leg: No edema.      Left lower leg: No edema.   Skin:     General: Skin is warm and dry.      Capillary Refill: Capillary refill takes less than 2 seconds.   Neurological:      General: No focal deficit present.      Mental Status: He is alert and oriented to person, place, and time.   Psychiatric:         Mood and Affect: Mood normal.          Behavior: Behavior normal.     Review of Systems    Vents:  Vent Mode: A/C (03/24/23 0349)  Ventilator Initiated: Yes (03/23/23 0000)  Set Rate: 12 BPM (03/24/23 0349)  Vt Set: 500 mL (03/24/23 0349)  PEEP/CPAP: 5 cmH20 (03/24/23 0349)  Oxygen Concentration (%): 40 (03/24/23 0710)  Peak Airway Pressure: 22 cmH20 (03/24/23 0349)  Total Ve: 8.8 L/m (03/24/23 0349)  F/VT Ratio<105 (RSBI): (!) 22.35 (03/24/23 0349)    Lines/Drains/Airways       Drain  Duration                  NG/OG Tube 03/23/23 0014 [place] sump Right nostril 1 day         Urethral Catheter 03/23/23 1000 Non-latex 16 Fr. <1 day              Airway  Duration                  Airway - Non-Surgical 03/22/23 2358 Endotracheal Tube 1 day              Peripheral Intravenous Line  Duration                  Peripheral IV - Single Lumen 03/23/23 0015 20 G Left;Posterior Forearm 1 day         Peripheral IV - Single Lumen 03/23/23 0305 20 G Posterior;Right Hand 1 day         Peripheral IV - Single Lumen 03/23/23 0330 20 G Anterior;Right Forearm 1 day                    Significant Labs:    CBC/Anemia Profile:  Recent Labs   Lab 03/23/23  0539 03/24/23  0500   WBC 9.49 10.88   HGB 9.0* 9.1*   HCT 27.2* 27.1*   PLT 88* 96*   .1* 105.0*   RDW 15.1* 15.2*        Chemistries:  Recent Labs   Lab 03/23/23  0539 03/24/23  0500    141   K 3.9 3.5   CL 96* 96*   CO2 35* 38*   BUN 33* 19*   CREATININE 2.40* 1.82*   CALCIUM 7.6* 7.4*   ALBUMIN 3.1* 3.1*   PROT 6.3* 6.5   BILITOT 0.9 1.0   ALKPHOS 46 45   ALT 73* 67*   * 141*       Recent Lab Results         03/24/23  0500   03/23/23  1222   03/23/23  1039        Albumin/Globulin Ratio 0.9           Albumin 3.1           Alkaline Phosphatase 45           ALT 67           Anion Gap 11                      Baso # 0.06           Basophil % 0.6           BILIRUBIN TOTAL 1.0           BUN 19           BUN/CREAT RATIO 10           Calcium 7.4           Chloride 96           CO2 38           Creatinine  1.82           Differential Type Auto           eGFR 46           Eos # 0.29           Eosinophil % 2.7           Globulin, Total 3.4           Glucose 142           Hematocrit 27.1           Hemoglobin 9.1           Immature Grans (Abs) 0.06           Immature Granulocytes 0.6           Lymph # 0.77           Lymph % 7.1           MCH 35.3           MCHC 33.6           .0           Mono # 0.87           Mono % 8.0           MPV 10.0           Neutrophils, Abs 8.83           Neutrophils Relative 81.0           nRBC 0.0           NUCLEATED RBC ABSOLUTE 0.00           Platelets 96           POC Glucose   142         Potassium 3.5           PROTEIN TOTAL 6.5           RBC 2.58           RDW 15.2           Sodium 141           Troponin I High Sensitivity     679.4       WBC 10.88                   Significant Imaging:  I have reviewed all pertinent imaging results/findings within the past 24 hours.

## 2023-03-24 NOTE — NURSING
0710 rec'd orally intubated. Resting quietly at present.  0830 pt waking up, agitated pulling at restraints. Ativan 2mg ivp given for same.  0915 re'c dprder to wean precedex off. Started this. Weaning levo as tolerated. Started tube feeds of isosource 1.5 at 10cc/hr with 60/hr free water flushes.  1000 pt sweating noted. Temp still 101.8. all coverings removed from bed.   1030 conpleted bath with gown and linen change. Pt tolerated well except he had several episode of tensing up all extremities. This is what I understand his tics look like. Pt eventually relaxed again when left alone.  Temp slowly coming down. No further sweating noted.Left great toe redressed. Continue to wean precedex as ordered. Will monitor  1200 resting quietly. Temp continues to decreased.   1230  Not tolerating weaning down of Levo any further. Will leave at 0.04 mcg/kg/min. Conitnue to titrate precedex per dr dave order this am.  1255 while changing out ng tube mg pt began sitting up in bed and pulling at restraints. Was also coughing and biting ETT. Ativan 2 mg ivp given as ordered.  1315 pt resting more quietly now. No more biting on tube.   1535 precedex off. Pt trying to sit up in bed, pulling at restraints, biting tube. Ativan 2 mg ivp given for same  1700 mom and sister here to see pt, update given, questions answered. Pt occasionally restless but not trying to sit up. Will calm with verbal   1800 temp up to 101. Tylenol 1000mg given per ng tube. Pt restless ativan 2 mg ivp given will monitor and report to oncoming shift.

## 2023-03-24 NOTE — ASSESSMENT & PLAN NOTE
Prozac and Doxepin on home medication list but states he was not taking these..   Was also started on those medications along  with tranxene, trazodone and Zyprexa at [place] over the last 2 days  -- Patient is likely starting to have withdraws from alcohol and subaxone; however, the combination of prozac and doxepin and trazadone all increase the risk of serotonin syndrome .. given this concern serotonin syndrome should be in the differential as well     Will watch

## 2023-03-24 NOTE — ASSESSMENT & PLAN NOTE
This is resolved will see what CO2 is today   pt report received for break coverage. pt a&ox3. pain 5/10 denies need for pain meds at this time.awaiting ct result. will continue to monitor closely. Pt awake and alert with parents at the bedside.  IV placed and is dry intact WNL, flushes without difficulty or discomfort. Fluid boluses running.  Pt awaiting ultrasound and lab results.

## 2023-03-24 NOTE — PLAN OF CARE
Problem: Infection  Goal: Absence of Infection Signs and Symptoms  Outcome: Ongoing, Progressing     Problem: Adult Inpatient Plan of Care  Goal: Plan of Care Review  Outcome: Ongoing, Progressing  Goal: Patient-Specific Goal (Individualized)  Outcome: Ongoing, Progressing  Goal: Absence of Hospital-Acquired Illness or Injury  Outcome: Ongoing, Progressing  Goal: Optimal Comfort and Wellbeing  Outcome: Ongoing, Progressing  Goal: Readiness for Transition of Care  Outcome: Ongoing, Progressing     Problem: Fluid and Electrolyte Imbalance (Acute Kidney Injury/Impairment)  Goal: Fluid and Electrolyte Balance  Outcome: Ongoing, Progressing     Problem: Oral Intake Inadequate (Acute Kidney Injury/Impairment)  Goal: Optimal Nutrition Intake  Outcome: Ongoing, Progressing     Problem: Renal Function Impairment (Acute Kidney Injury/Impairment)  Goal: Effective Renal Function  Outcome: Ongoing, Progressing     Problem: Skin Injury Risk Increased  Goal: Skin Health and Integrity  Outcome: Ongoing, Progressing     Problem: Fall Injury Risk  Goal: Absence of Fall and Fall-Related Injury  Outcome: Ongoing, Progressing     Problem: Restraint, Nonbehavioral (Nonviolent)  Goal: Absence of Harm or Injury  Outcome: Ongoing, Progressing     Problem: Communication Impairment (Mechanical Ventilation, Invasive)  Goal: Effective Communication  Outcome: Ongoing, Progressing     Problem: Device-Related Complication Risk (Mechanical Ventilation, Invasive)  Goal: Optimal Device Function  Outcome: Ongoing, Progressing     Problem: Inability to Wean (Mechanical Ventilation, Invasive)  Goal: Mechanical Ventilation Liberation  Outcome: Ongoing, Progressing     Problem: Nutrition Impairment (Mechanical Ventilation, Invasive)  Goal: Optimal Nutrition Delivery  Outcome: Ongoing, Progressing     Problem: Skin and Tissue Injury (Mechanical Ventilation, Invasive)  Goal: Absence of Device-Related Skin and Tissue Injury  Outcome: Ongoing, Progressing      Problem: Ventilator-Induced Lung Injury (Mechanical Ventilation, Invasive)  Goal: Absence of Ventilator-Induced Lung Injury  Outcome: Ongoing, Progressing     Problem: Communication Impairment (Artificial Airway)  Goal: Effective Communication  Outcome: Ongoing, Progressing     Problem: Device-Related Complication Risk (Artificial Airway)  Goal: Optimal Device Function  Outcome: Ongoing, Progressing     Problem: Skin and Tissue Injury (Artificial Airway)  Goal: Absence of Device-Related Skin or Tissue Injury  Outcome: Ongoing, Progressing     Problem: Noninvasive Ventilation Acute  Goal: Effective Unassisted Ventilation and Oxygenation  Outcome: Ongoing, Progressing     Problem: Impaired Wound Healing  Goal: Optimal Wound Healing  Outcome: Ongoing, Progressing

## 2023-03-24 NOTE — PROGRESS NOTES
[facility]  Pulmonology  Progress Note    Patient Name: Carol Ladd Jr.  MRN: [id number]  Admission Date: 3/21/2023  Hospital Length of Stay: 3 days  Code Status: No Order  Attending Provider: Des Navarro MD  Primary Care Provider: Raymundo Cabrera MD   Principal Problem: Alcohol withdrawal syndrome with complication    Subjective:     Interval History: Patient sedated      Objective:     Vital Signs (Most Recent):  Temp: (!) 102.7 °F (39.3 °C) (03/24/23 0815)  Pulse: 100 (03/24/23 0815)  Resp: 13 (03/24/23 0815)  BP: (!) 50/16 (03/24/23 0815)  SpO2: 98 % (03/24/23 0815)   Vital Signs (24h Range):  Temp:  [98.2 °F (36.8 °C)-102.7 °F (39.3 °C)] 102.7 °F (39.3 °C)  Pulse:  [] 100  Resp:  [11-21] 13  SpO2:  [93 %-100 %] 98 %  BP: ()/(16-92) 50/16     Weight: 115 kg (253 lb 8.5 oz)  Body mass index is 34.38 kg/m².      Intake/Output Summary (Last 24 hours) at 3/24/2023 0833  Last data filed at 3/24/2023 0800  Gross per 24 hour   Intake 6608.23 ml   Output 5125 ml   Net 1483.23 ml       Physical Exam  Vitals reviewed.   Constitutional:       Appearance: Normal appearance.      Interventions: He is not intubated.  HENT:      Head: Normocephalic and atraumatic.      Nose: Nose normal.      Mouth/Throat:      Mouth: Mucous membranes are dry.      Pharynx: Oropharynx is clear.   Eyes:      Extraocular Movements: Extraocular movements intact.      Conjunctiva/sclera: Conjunctivae normal.      Pupils: Pupils are equal, round, and reactive to light.   Cardiovascular:      Rate and Rhythm: Normal rate.      Heart sounds: Normal heart sounds. No murmur heard.  Pulmonary:      Effort: Pulmonary effort is normal. He is not intubated.      Breath sounds: Normal breath sounds.   Abdominal:      General: Abdomen is flat. Bowel sounds are normal.      Palpations: Abdomen is soft.   Musculoskeletal:         General: Normal range of motion.      Cervical back: Normal range of motion and  neck supple.      Right lower leg: No edema.      Left lower leg: No edema.   Skin:     General: Skin is warm and dry.      Capillary Refill: Capillary refill takes less than 2 seconds.   Neurological:      General: No focal deficit present.      Mental Status: He is alert and oriented to person, place, and time.   Psychiatric:         Mood and Affect: Mood normal.         Behavior: Behavior normal.     Review of Systems    Vents:  Vent Mode: A/C (03/24/23 0349)  Ventilator Initiated: Yes (03/23/23 0000)  Set Rate: 12 BPM (03/24/23 0349)  Vt Set: 500 mL (03/24/23 0349)  PEEP/CPAP: 5 cmH20 (03/24/23 0349)  Oxygen Concentration (%): 40 (03/24/23 0710)  Peak Airway Pressure: 22 cmH20 (03/24/23 0349)  Total Ve: 8.8 L/m (03/24/23 0349)  F/VT Ratio<105 (RSBI): (!) 22.35 (03/24/23 0349)    Lines/Drains/Airways       Drain  Duration                  NG/OG Tube 03/23/23 0014 [place] sump Right nostril 1 day         Urethral Catheter 03/23/23 1000 Non-latex 16 Fr. <1 day              Airway  Duration                  Airway - Non-Surgical 03/22/23 2358 Endotracheal Tube 1 day              Peripheral Intravenous Line  Duration                  Peripheral IV - Single Lumen 03/23/23 0015 20 G Left;Posterior Forearm 1 day         Peripheral IV - Single Lumen 03/23/23 0305 20 G Posterior;Right Hand 1 day         Peripheral IV - Single Lumen 03/23/23 0330 20 G Anterior;Right Forearm 1 day                    Significant Labs:    CBC/Anemia Profile:  Recent Labs   Lab 03/23/23  0539 03/24/23  0500   WBC 9.49 10.88   HGB 9.0* 9.1*   HCT 27.2* 27.1*   PLT 88* 96*   .1* 105.0*   RDW 15.1* 15.2*        Chemistries:  Recent Labs   Lab 03/23/23  0539 03/24/23  0500    141   K 3.9 3.5   CL 96* 96*   CO2 35* 38*   BUN 33* 19*   CREATININE 2.40* 1.82*   CALCIUM 7.6* 7.4*   ALBUMIN 3.1* 3.1*   PROT 6.3* 6.5   BILITOT 0.9 1.0   ALKPHOS 46 45   ALT 73* 67*   * 141*       Recent Lab Results         03/24/23  7406    03/23/23  1222   03/23/23  1039        Albumin/Globulin Ratio 0.9           Albumin 3.1           Alkaline Phosphatase 45           ALT 67           Anion Gap 11                      Baso # 0.06           Basophil % 0.6           BILIRUBIN TOTAL 1.0           BUN 19           BUN/CREAT RATIO 10           Calcium 7.4           Chloride 96           CO2 38           Creatinine 1.82           Differential Type Auto           eGFR 46           Eos # 0.29           Eosinophil % 2.7           Globulin, Total 3.4           Glucose 142           Hematocrit 27.1           Hemoglobin 9.1           Immature Grans (Abs) 0.06           Immature Granulocytes 0.6           Lymph # 0.77           Lymph % 7.1           MCH 35.3           MCHC 33.6           .0           Mono # 0.87           Mono % 8.0           MPV 10.0           Neutrophils, Abs 8.83           Neutrophils Relative 81.0           nRBC 0.0           NUCLEATED RBC ABSOLUTE 0.00           Platelets 96           POC Glucose   142         Potassium 3.5           PROTEIN TOTAL 6.5           RBC 2.58           RDW 15.2           Sodium 141           Troponin I High Sensitivity     679.4       WBC 10.88                   Significant Imaging:  I have reviewed all pertinent imaging results/findings within the past 24 hours.    Assessment/Plan:     Neuro  Encephalopathy, toxic  This is due to multiple medications and probably withdrawal    AMS (altered mental status)  Not sure why he has altered mental    Tourette syndrome  Currently stable with sedation his tics are completely gone    Psychiatric  * Alcohol withdrawal syndrome with complication  Currently intubated will watch    Depression  Prozac and Doxepin on home medication list but states he was not taking these..   Was also started on those medications along  with tranxene, trazodone and Zyprexa at [place] over the last 2 days  -- Patient is likely starting to have withdraws from alcohol and subaxone;  however, the combination of prozac and doxepin and trazadone all increase the risk of serotonin syndrome .. given this concern serotonin syndrome should be in the differential as well     Will watch          Cardiac/Vascular  HTN (hypertension)  Blood pressure is better with sedation    Renal/  Metabolic acidosis  This is resolved will see what CO2 is today    Acute kidney injury  Creatinine is pending      Orthopedic  Rhabdomyolysis  CPK is pending today will continue to watch    Other  Fever  Uncertain of the cause but certainly will stop any medicines that looks like it could be due to antipsychotics we stopped all that use Ativan we may consider dantrolene if it continues                 Des Navarro MD  Pulmonology  [facility]

## 2023-03-25 LAB
ALBUMIN SERPL BCP-MCNC: 2.9 G/DL (ref 3.5–5)
ALBUMIN/GLOB SERPL: 1 {RATIO}
ALP SERPL-CCNC: 61 U/L (ref 45–115)
ALT SERPL W P-5'-P-CCNC: 65 U/L (ref 16–61)
ANION GAP SERPL CALCULATED.3IONS-SCNC: 12 MMOL/L (ref 7–16)
ANISOCYTOSIS BLD QL SMEAR: ABNORMAL
AST SERPL W P-5'-P-CCNC: 119 U/L (ref 15–37)
BASOPHILS # BLD AUTO: 0.04 K/UL (ref 0–0.2)
BASOPHILS NFR BLD AUTO: 0.5 % (ref 0–1)
BILIRUB SERPL-MCNC: 1.1 MG/DL (ref ?–1.2)
BUN SERPL-MCNC: 21 MG/DL (ref 7–18)
BUN/CREAT SERPL: 11 (ref 6–20)
CALCIUM SERPL-MCNC: 7 MG/DL (ref 8.5–10.1)
CHLORIDE SERPL-SCNC: 90 MMOL/L (ref 98–107)
CK SERPL-CCNC: 3419 U/L (ref 39–308)
CO2 SERPL-SCNC: 39 MMOL/L (ref 21–32)
CREAT SERPL-MCNC: 1.87 MG/DL (ref 0.7–1.3)
DIFFERENTIAL METHOD BLD: ABNORMAL
EGFR (NO RACE VARIABLE) (RUSH/TITUS): 45 ML/MIN/1.73M²
EOSINOPHIL # BLD AUTO: 0.1 K/UL (ref 0–0.5)
EOSINOPHIL NFR BLD AUTO: 1.2 % (ref 1–4)
ERYTHROCYTE [DISTWIDTH] IN BLOOD BY AUTOMATED COUNT: 15.6 % (ref 11.5–14.5)
GLOBULIN SER-MCNC: 3 G/DL (ref 2–4)
GLUCOSE SERPL-MCNC: 119 MG/DL (ref 74–106)
HCT VFR BLD AUTO: 23.9 % (ref 40–54)
HGB BLD-MCNC: 8.2 G/DL (ref 13.5–18)
IMM GRANULOCYTES # BLD AUTO: 0.04 K/UL (ref 0–0.04)
IMM GRANULOCYTES NFR BLD: 0.5 % (ref 0–0.4)
LYMPHOCYTES # BLD AUTO: 0.76 K/UL (ref 1–4.8)
LYMPHOCYTES NFR BLD AUTO: 8.8 % (ref 27–41)
MACROCYTES BLD QL SMEAR: ABNORMAL
MCH RBC QN AUTO: 36.8 PG (ref 27–31)
MCHC RBC AUTO-ENTMCNC: 34.3 G/DL (ref 32–36)
MCV RBC AUTO: 107.2 FL (ref 80–96)
MONOCYTES # BLD AUTO: 0.76 K/UL (ref 0–0.8)
MONOCYTES NFR BLD AUTO: 8.8 % (ref 2–6)
MPC BLD CALC-MCNC: 9.8 FL (ref 9.4–12.4)
NEUTROPHILS # BLD AUTO: 6.9 K/UL (ref 1.8–7.7)
NEUTROPHILS NFR BLD AUTO: 80.2 % (ref 53–65)
NRBC # BLD AUTO: 0 X10E3/UL
NRBC, AUTO (.00): 0 %
PLATELET # BLD AUTO: 122 K/UL (ref 150–400)
PLATELET MORPHOLOGY: ABNORMAL
POLYCHROMASIA BLD QL SMEAR: ABNORMAL
POTASSIUM SERPL-SCNC: 3.3 MMOL/L (ref 3.5–5.1)
PROT SERPL-MCNC: 5.9 G/DL (ref 6.4–8.2)
RBC # BLD AUTO: 2.23 M/UL (ref 4.6–6.2)
SODIUM SERPL-SCNC: 138 MMOL/L (ref 136–145)
WBC # BLD AUTO: 8.6 K/UL (ref 4.5–11)

## 2023-03-25 PROCEDURE — 25000003 PHARM REV CODE 250: Performed by: EMERGENCY MEDICINE

## 2023-03-25 PROCEDURE — 82550 ASSAY OF CK (CPK): CPT | Performed by: INTERNAL MEDICINE

## 2023-03-25 PROCEDURE — 25000003 PHARM REV CODE 250: Performed by: HOSPITALIST

## 2023-03-25 PROCEDURE — 80053 COMPREHEN METABOLIC PANEL: CPT | Performed by: NURSE PRACTITIONER

## 2023-03-25 PROCEDURE — 99900035 HC TECH TIME PER 15 MIN (STAT)

## 2023-03-25 PROCEDURE — 94761 N-INVAS EAR/PLS OXIMETRY MLT: CPT

## 2023-03-25 PROCEDURE — 27200966 HC CLOSED SUCTION SYSTEM

## 2023-03-25 PROCEDURE — 85025 COMPLETE CBC W/AUTO DIFF WBC: CPT | Performed by: NURSE PRACTITIONER

## 2023-03-25 PROCEDURE — 27000221 HC OXYGEN, UP TO 24 HOURS

## 2023-03-25 PROCEDURE — 63600175 PHARM REV CODE 636 W HCPCS: Performed by: HOSPITALIST

## 2023-03-25 PROCEDURE — 99291 CRITICAL CARE FIRST HOUR: CPT | Mod: ,,, | Performed by: INTERNAL MEDICINE

## 2023-03-25 PROCEDURE — 63600175 PHARM REV CODE 636 W HCPCS: Performed by: NURSE PRACTITIONER

## 2023-03-25 PROCEDURE — 20000000 HC ICU ROOM

## 2023-03-25 PROCEDURE — 99291 PR CRITICAL CARE, E/M 30-74 MINUTES: ICD-10-PCS | Mod: ,,, | Performed by: INTERNAL MEDICINE

## 2023-03-25 PROCEDURE — 25000003 PHARM REV CODE 250: Performed by: NURSE PRACTITIONER

## 2023-03-25 PROCEDURE — 25000003 PHARM REV CODE 250: Performed by: INTERNAL MEDICINE

## 2023-03-25 PROCEDURE — 99900026 HC AIRWAY MAINTENANCE (STAT)

## 2023-03-25 PROCEDURE — 94003 VENT MGMT INPAT SUBQ DAY: CPT

## 2023-03-25 RX ADMIN — FENTANYL CITRATE 125 MCG/HR: 50 INJECTION, SOLUTION INTRAMUSCULAR; INTRAVENOUS at 08:03

## 2023-03-25 RX ADMIN — ASPIRIN 81 MG: 81 TABLET, DELAYED RELEASE ORAL at 08:03

## 2023-03-25 RX ADMIN — PROPOFOL 45 MCG/KG/MIN: 10 INJECTION, EMULSION INTRAVENOUS at 09:03

## 2023-03-25 RX ADMIN — LORAZEPAM 2 MG: 2 INJECTION INTRAMUSCULAR; INTRAVENOUS at 07:03

## 2023-03-25 RX ADMIN — NOREPINEPHRINE BITARTRATE 0.02 MCG/KG/MIN: 4 INJECTION, SOLUTION INTRAVENOUS at 01:03

## 2023-03-25 RX ADMIN — DIPHENHYDRAMINE HYDROCHLORIDE 50 MG: 50 INJECTION, SOLUTION INTRAMUSCULAR; INTRAVENOUS at 01:03

## 2023-03-25 RX ADMIN — PROPOFOL 30 MCG/KG/MIN: 10 INJECTION, EMULSION INTRAVENOUS at 01:03

## 2023-03-25 RX ADMIN — NOREPINEPHRINE BITARTRATE 0.14 MCG/KG/MIN: 4 INJECTION, SOLUTION INTRAVENOUS at 06:03

## 2023-03-25 RX ADMIN — LEVETIRACETAM 500 MG: 500 TABLET, FILM COATED ORAL at 08:03

## 2023-03-25 RX ADMIN — LORAZEPAM 2 MG: 2 INJECTION INTRAMUSCULAR; INTRAVENOUS at 11:03

## 2023-03-25 RX ADMIN — MUPIROCIN: 20 OINTMENT TOPICAL at 08:03

## 2023-03-25 RX ADMIN — LORAZEPAM 2 MG: 2 INJECTION INTRAMUSCULAR; INTRAVENOUS at 05:03

## 2023-03-25 RX ADMIN — PROPOFOL 25 MCG/KG/MIN: 10 INJECTION, EMULSION INTRAVENOUS at 06:03

## 2023-03-25 RX ADMIN — LORAZEPAM 2 MG: 2 INJECTION INTRAMUSCULAR; INTRAVENOUS at 12:03

## 2023-03-25 RX ADMIN — PIPERACILLIN AND TAZOBACTAM 4.5 G: 4; .5 INJECTION, POWDER, FOR SOLUTION INTRAVENOUS; PARENTERAL at 01:03

## 2023-03-25 RX ADMIN — NOREPINEPHRINE BITARTRATE 0.1 MCG/KG/MIN: 4 INJECTION, SOLUTION INTRAVENOUS at 08:03

## 2023-03-25 RX ADMIN — MUPIROCIN: 20 OINTMENT TOPICAL at 09:03

## 2023-03-25 RX ADMIN — FENTANYL CITRATE 175 MCG/HR: 50 INJECTION, SOLUTION INTRAMUSCULAR; INTRAVENOUS at 02:03

## 2023-03-25 RX ADMIN — DIPHENHYDRAMINE HYDROCHLORIDE 50 MG: 50 INJECTION, SOLUTION INTRAMUSCULAR; INTRAVENOUS at 08:03

## 2023-03-25 RX ADMIN — ACETAMINOPHEN 1000 MG: 500 TABLET ORAL at 03:03

## 2023-03-25 RX ADMIN — LORAZEPAM 2 MG: 2 INJECTION INTRAMUSCULAR; INTRAVENOUS at 01:03

## 2023-03-25 RX ADMIN — FENTANYL CITRATE 150 MCG/HR: 50 INJECTION, SOLUTION INTRAMUSCULAR; INTRAVENOUS at 09:03

## 2023-03-25 RX ADMIN — FENTANYL CITRATE 175 MCG/HR: 50 INJECTION, SOLUTION INTRAMUSCULAR; INTRAVENOUS at 10:03

## 2023-03-25 RX ADMIN — SODIUM BICARBONATE: 84 INJECTION, SOLUTION INTRAVENOUS at 03:03

## 2023-03-25 RX ADMIN — PROPOFOL 50 MCG/KG/MIN: 10 INJECTION, EMULSION INTRAVENOUS at 06:03

## 2023-03-25 RX ADMIN — NOREPINEPHRINE BITARTRATE 0.1 MCG/KG/MIN: 4 INJECTION, SOLUTION INTRAVENOUS at 12:03

## 2023-03-25 RX ADMIN — PROPOFOL 45 MCG/KG/MIN: 10 INJECTION, EMULSION INTRAVENOUS at 08:03

## 2023-03-25 RX ADMIN — PIPERACILLIN AND TAZOBACTAM 4.5 G: 4; .5 INJECTION, POWDER, FOR SOLUTION INTRAVENOUS; PARENTERAL at 05:03

## 2023-03-25 RX ADMIN — PROPOFOL 50 MCG/KG/MIN: 10 INJECTION, EMULSION INTRAVENOUS at 01:03

## 2023-03-25 RX ADMIN — NOREPINEPHRINE BITARTRATE 0.08 MCG/KG/MIN: 4 INJECTION, SOLUTION INTRAVENOUS at 05:03

## 2023-03-25 RX ADMIN — PROPOFOL 50 MCG/KG/MIN: 10 INJECTION, EMULSION INTRAVENOUS at 03:03

## 2023-03-25 RX ADMIN — LORAZEPAM 2 MG: 2 INJECTION INTRAMUSCULAR; INTRAVENOUS at 03:03

## 2023-03-25 RX ADMIN — SODIUM BICARBONATE: 84 INJECTION, SOLUTION INTRAVENOUS at 08:03

## 2023-03-25 RX ADMIN — SODIUM BICARBONATE: 84 INJECTION, SOLUTION INTRAVENOUS at 01:03

## 2023-03-25 RX ADMIN — PIPERACILLIN AND TAZOBACTAM 4.5 G: 4; .5 INJECTION, POWDER, FOR SOLUTION INTRAVENOUS; PARENTERAL at 09:03

## 2023-03-25 NOTE — NURSING
Levophed titrated up per md order to maintain a map over 60mm/hg. Current bp is 94/37 with a map of  55.

## 2023-03-25 NOTE — NURSING
Levophed titrated up per md order to maintain a map above 60mm/hg. BP is currently 82/35 with a map of 41. I contacted Dr. Jernigan, and we discussed pt.'s elevated temp, which is not new, and pt.'s increased secretions. She is going to start him on zosyn ivpb.

## 2023-03-25 NOTE — PROGRESS NOTES
[facility]  Pulmonology  Progress Note    Patient Name: Carol Ladd Jr.  MRN: [id number]  Admission Date: 3/21/2023  Hospital Length of Stay: 4 days  Code Status: No Order  Attending Provider: Des Navarro MD  Primary Care Provider: Raymundo Cabrera MD   Principal Problem: Alcohol withdrawal syndrome with complication    Subjective:     Interval History: No acute events overnight. The patient remains intubated and sedated. Tmax of 102.9. Oxygenating adequately on 50%.      Objective:     Vital Signs (Most Recent):  Temp: 99 °F (37.2 °C) (03/25/23 1030)  Pulse: 95 (03/25/23 1030)  Resp: 12 (03/25/23 1030)  BP: (!) 113/51 (03/25/23 1030)  SpO2: (!) 94 % (03/25/23 1030)   Vital Signs (24h Range):  Temp:  [99 °F (37.2 °C)-102.2 °F (39 °C)] 99 °F (37.2 °C)  Pulse:  [] 95  Resp:  [8-22] 12  SpO2:  [76 %-100 %] 94 %  BP: ()/(24-69) 113/51     Weight: 116.2 kg (256 lb 2.8 oz)  Body mass index is 34.74 kg/m².      Intake/Output Summary (Last 24 hours) at 3/25/2023 1049  Last data filed at 3/25/2023 0600  Gross per 24 hour   Intake 7486.34 ml   Output 1900 ml   Net 5586.34 ml       Physical Exam  Vitals reviewed.   Constitutional:       General: He is not in acute distress.     Appearance: He is ill-appearing.   HENT:      Head: Normocephalic and atraumatic.      Right Ear: External ear normal.      Left Ear: External ear normal.      Mouth/Throat:      Mouth: Mucous membranes are dry.      Pharynx: Oropharynx is clear.   Eyes:      Extraocular Movements: Extraocular movements intact.      Conjunctiva/sclera: Conjunctivae normal.   Cardiovascular:      Rate and Rhythm: Normal rate and regular rhythm.   Pulmonary:      Effort: Pulmonary effort is normal.      Breath sounds: Normal breath sounds. No wheezing or rales.      Comments: Clear breath sounds anteriorly on mechanical ventilation  Abdominal:      General: Bowel sounds are normal.      Palpations: Abdomen is soft.       Tenderness: There is no abdominal tenderness.   Musculoskeletal:         General: Normal range of motion.      Cervical back: Normal range of motion and neck supple.   Skin:     General: Skin is warm and dry.      Capillary Refill: Capillary refill takes less than 2 seconds.   Neurological:      Mental Status: He is lethargic.      Comments: Currently intubated and sedated     Review of Systems    Vents:  Vent Mode: A/C (03/25/23 0800)  Ventilator Initiated: Yes (03/23/23 0000)  Set Rate: 12 BPM (03/25/23 0800)  Vt Set: 500 mL (03/25/23 0800)  PEEP/CPAP: 5 cmH20 (03/25/23 0800)  Oxygen Concentration (%): 40 (03/25/23 0800)  Peak Airway Pressure: 41 cmH20 (03/25/23 0800)  Total Ve: 8.5 L/m (03/25/23 0800)  F/VT Ratio<105 (RSBI): (!) 14.58 (03/25/23 0800)    Lines/Drains/Airways       Drain  Duration                  NG/OG Tube 03/23/23 0014 [place] sump Right nostril 2 days         Urethral Catheter 03/23/23 1000 Non-latex 16 Fr. 2 days              Airway  Duration                  Airway - Non-Surgical 03/22/23 2358 Endotracheal Tube 2 days              Peripheral Intravenous Line  Duration                  Peripheral IV - Single Lumen 03/23/23 0015 20 G Left;Posterior Forearm 2 days         Peripheral IV - Single Lumen 03/23/23 0305 20 G Posterior;Right Hand 2 days         Peripheral IV - Single Lumen 03/23/23 0330 20 G Anterior;Right Forearm 2 days                    Significant Labs:    CBC/Anemia Profile:  Recent Labs   Lab 03/24/23  0500 03/25/23  0754   WBC 10.88 8.60   HGB 9.1* 8.2*   HCT 27.1* 23.9*   PLT 96* 122*   .0* 107.2*   RDW 15.2* 15.6*        Chemistries:  Recent Labs   Lab 03/24/23  0500 03/25/23  0442    138   K 3.5 3.3*   CL 96* 90*   CO2 38* 39*   BUN 19* 21*   CREATININE 1.82* 1.87*   CALCIUM 7.4* 7.0*   ALBUMIN 3.1* 2.9*   PROT 6.5 5.9*   BILITOT 1.0 1.1   ALKPHOS 45 61   ALT 67* 65*   * 119*       All pertinent labs within the past 24 hours have been  reviewed.    Significant Imaging:  I have reviewed all pertinent imaging results/findings within the past 24 hours.    Assessment/Plan:     Neuro  AMS (altered mental status)  Currently sedated    Tourette syndrome  Currently stable with sedation his tics are completely gone    Psychiatric  * Alcohol withdrawal syndrome with complication  Required intubation and sedation  Last drink was 3/19 so still right in the middle of possible DT's  Continue with sedation for now      Cardiac/Vascular  HTN (hypertension)  Blood pressure is better with sedation    Renal/  Metabolic acidosis  Improved    Acute kidney injury  Related to rhabdo  Creatinine is 1.87 today. High was 4.37. UOP adequate      Orthopedic  Rhabdomyolysis  Improving  CK is 3400 today down from [place] max of 51251    Other  Fever  Uncertain of the cause but certainly will stop any medicines that looks like it could be due to antipsychotics we stopped all that use Ativan we may consider dantrolene if it continues  3/25- cultures negative. Likely related to withdrawal        The patient remains critically ill. This note includes approximately 30 minutes of critical care time spent in the management of this patient. This includes review of labs, data, imaging, and vent management.           Zeyad Suazo,   Pulmonology  [facility]

## 2023-03-25 NOTE — HOSPITAL COURSE
3/25- patient remains intubated and sedated. UOP is adequate. CK down to 3400. Tmax of 102.9 in last 24 hours. Cultures with no growth  03/26- febrile this AM -- plans to re-culture; covid/flu swab and venous dopplers   4/1- still with some fever; back to baseline mentation- does have some intermittent anxiousness

## 2023-03-25 NOTE — NURSING
Fentanyl titrated up per md order to maintain a rass of -2. Pt. Is agitated at thistime. See RASS scores.

## 2023-03-25 NOTE — NURSING
Levophed weaned up per md order to maintain a map above 60mm/hg. BP now is 84/31 with a map of 50.

## 2023-03-25 NOTE — NURSING
Fentanyl titrated up per md order to maintain a rass of -2. Pt. Is restless at this time. See RASS scores.

## 2023-03-25 NOTE — ASSESSMENT & PLAN NOTE
Uncertain of the cause but certainly will stop any medicines that looks like it could be due to antipsychotics we stopped all that use Ativan we may consider dantrolene if it continues  3/25- cultures negative. Likely related to withdrawal

## 2023-03-25 NOTE — SUBJECTIVE & OBJECTIVE
Interval History: No acute events overnight. The patient remains intubated and sedated. Tmax of 102.9. Oxygenating adequately on 50%.      Objective:     Vital Signs (Most Recent):  Temp: 99 °F (37.2 °C) (03/25/23 1030)  Pulse: 95 (03/25/23 1030)  Resp: 12 (03/25/23 1030)  BP: (!) 113/51 (03/25/23 1030)  SpO2: (!) 94 % (03/25/23 1030)   Vital Signs (24h Range):  Temp:  [99 °F (37.2 °C)-102.2 °F (39 °C)] 99 °F (37.2 °C)  Pulse:  [] 95  Resp:  [8-22] 12  SpO2:  [76 %-100 %] 94 %  BP: ()/(24-69) 113/51     Weight: 116.2 kg (256 lb 2.8 oz)  Body mass index is 34.74 kg/m².      Intake/Output Summary (Last 24 hours) at 3/25/2023 1049  Last data filed at 3/25/2023 0600  Gross per 24 hour   Intake 7486.34 ml   Output 1900 ml   Net 5586.34 ml       Physical Exam  Vitals reviewed.   Constitutional:       General: He is not in acute distress.     Appearance: He is ill-appearing.   HENT:      Head: Normocephalic and atraumatic.      Right Ear: External ear normal.      Left Ear: External ear normal.      Mouth/Throat:      Mouth: Mucous membranes are dry.      Pharynx: Oropharynx is clear.   Eyes:      Extraocular Movements: Extraocular movements intact.      Conjunctiva/sclera: Conjunctivae normal.   Cardiovascular:      Rate and Rhythm: Normal rate and regular rhythm.   Pulmonary:      Effort: Pulmonary effort is normal.      Breath sounds: Normal breath sounds. No wheezing or rales.      Comments: Clear breath sounds anteriorly on mechanical ventilation  Abdominal:      General: Bowel sounds are normal.      Palpations: Abdomen is soft.      Tenderness: There is no abdominal tenderness.   Musculoskeletal:         General: Normal range of motion.      Cervical back: Normal range of motion and neck supple.   Skin:     General: Skin is warm and dry.      Capillary Refill: Capillary refill takes less than 2 seconds.   Neurological:      Mental Status: He is lethargic.      Comments: Currently intubated and sedated      Review of Systems    Vents:  Vent Mode: A/C (03/25/23 0800)  Ventilator Initiated: Yes (03/23/23 0000)  Set Rate: 12 BPM (03/25/23 0800)  Vt Set: 500 mL (03/25/23 0800)  PEEP/CPAP: 5 cmH20 (03/25/23 0800)  Oxygen Concentration (%): 40 (03/25/23 0800)  Peak Airway Pressure: 41 cmH20 (03/25/23 0800)  Total Ve: 8.5 L/m (03/25/23 0800)  F/VT Ratio<105 (RSBI): (!) 14.58 (03/25/23 0800)    Lines/Drains/Airways       Drain  Duration                  NG/OG Tube 03/23/23 0014 [place] sump Right nostril 2 days         Urethral Catheter 03/23/23 1000 Non-latex 16 Fr. 2 days              Airway  Duration                  Airway - Non-Surgical 03/22/23 2358 Endotracheal Tube 2 days              Peripheral Intravenous Line  Duration                  Peripheral IV - Single Lumen 03/23/23 0015 20 G Left;Posterior Forearm 2 days         Peripheral IV - Single Lumen 03/23/23 0305 20 G Posterior;Right Hand 2 days         Peripheral IV - Single Lumen 03/23/23 0330 20 G Anterior;Right Forearm 2 days                    Significant Labs:    CBC/Anemia Profile:  Recent Labs   Lab 03/24/23  0500 03/25/23  0754   WBC 10.88 8.60   HGB 9.1* 8.2*   HCT 27.1* 23.9*   PLT 96* 122*   .0* 107.2*   RDW 15.2* 15.6*        Chemistries:  Recent Labs   Lab 03/24/23  0500 03/25/23  0442    138   K 3.5 3.3*   CL 96* 90*   CO2 38* 39*   BUN 19* 21*   CREATININE 1.82* 1.87*   CALCIUM 7.4* 7.0*   ALBUMIN 3.1* 2.9*   PROT 6.5 5.9*   BILITOT 1.0 1.1   ALKPHOS 45 61   ALT 67* 65*   * 119*       All pertinent labs within the past 24 hours have been reviewed.    Significant Imaging:  I have reviewed all pertinent imaging results/findings within the past 24 hours.

## 2023-03-25 NOTE — NURSING
Fentanyl titrated up per md order to maintain a rass of -2. Pt. Is restless at this time. See RASS scoring.

## 2023-03-25 NOTE — ASSESSMENT & PLAN NOTE
Required intubation and sedation  Last drink was 3/19 so still right in the middle of possible DT's  Continue with sedation for now

## 2023-03-25 NOTE — NURSING
Fentanyl titrated up per md order in an attempt to maintain a rass of -2. Pt. Is restless at this time. See RASS scores.

## 2023-03-25 NOTE — NURSING
Levophed titated down per md order to maintain a map above 60mm/hg. BP is 117/51 with a map of 75.

## 2023-03-25 NOTE — NURSING
Levophed weaned down per md order to maintain a map above 60mm/ghg. Current bp is 120/48 with a map of 72.

## 2023-03-25 NOTE — NURSING
Levophed weaned down per md order to keep bp map above 60. BP presently is 134/67 with a map of 75.

## 2023-03-26 PROBLEM — G92.9 ENCEPHALOPATHY, TOXIC: Status: RESOLVED | Noted: 2023-03-23 | Resolved: 2023-03-26

## 2023-03-26 LAB
ALBUMIN SERPL BCP-MCNC: 2.6 G/DL (ref 3.5–5)
ALBUMIN/GLOB SERPL: 0.8 {RATIO}
ALP SERPL-CCNC: 62 U/L (ref 45–115)
ALT SERPL W P-5'-P-CCNC: 55 U/L (ref 16–61)
ANION GAP SERPL CALCULATED.3IONS-SCNC: 9 MMOL/L (ref 7–16)
AST SERPL W P-5'-P-CCNC: 76 U/L (ref 15–37)
BASOPHILS # BLD AUTO: 0.05 K/UL (ref 0–0.2)
BASOPHILS NFR BLD AUTO: 0.5 % (ref 0–1)
BILIRUB SERPL-MCNC: 0.9 MG/DL (ref ?–1.2)
BUN SERPL-MCNC: 16 MG/DL (ref 7–18)
BUN/CREAT SERPL: 9 (ref 6–20)
CALCIUM SERPL-MCNC: 7.1 MG/DL (ref 8.5–10.1)
CHLORIDE SERPL-SCNC: 93 MMOL/L (ref 98–107)
CK SERPL-CCNC: 1743 U/L (ref 39–308)
CO2 SERPL-SCNC: 42 MMOL/L (ref 21–32)
CREAT SERPL-MCNC: 1.84 MG/DL (ref 0.7–1.3)
DIFFERENTIAL METHOD BLD: ABNORMAL
EGFR (NO RACE VARIABLE) (RUSH/TITUS): 46 ML/MIN/1.73M²
EOSINOPHIL # BLD AUTO: 0.19 K/UL (ref 0–0.5)
EOSINOPHIL NFR BLD AUTO: 2.1 % (ref 1–4)
ERYTHROCYTE [DISTWIDTH] IN BLOOD BY AUTOMATED COUNT: 15.9 % (ref 11.5–14.5)
FLUAV AG UPPER RESP QL IA.RAPID: NEGATIVE
FLUBV AG UPPER RESP QL IA.RAPID: NEGATIVE
GLOBULIN SER-MCNC: 3.2 G/DL (ref 2–4)
GLUCOSE SERPL-MCNC: 109 MG/DL (ref 74–106)
HCT VFR BLD AUTO: 25.5 % (ref 40–54)
HGB BLD-MCNC: 8.1 G/DL (ref 13.5–18)
IMM GRANULOCYTES # BLD AUTO: 0.06 K/UL (ref 0–0.04)
IMM GRANULOCYTES NFR BLD: 0.7 % (ref 0–0.4)
LYMPHOCYTES # BLD AUTO: 0.99 K/UL (ref 1–4.8)
LYMPHOCYTES NFR BLD AUTO: 10.9 % (ref 27–41)
MCH RBC QN AUTO: 35.7 PG (ref 27–31)
MCHC RBC AUTO-ENTMCNC: 31.8 G/DL (ref 32–36)
MCV RBC AUTO: 112.3 FL (ref 80–96)
MONOCYTES # BLD AUTO: 1.58 K/UL (ref 0–0.8)
MONOCYTES NFR BLD AUTO: 17.3 % (ref 2–6)
MPC BLD CALC-MCNC: 10.5 FL (ref 9.4–12.4)
NEUTROPHILS # BLD AUTO: 6.25 K/UL (ref 1.8–7.7)
NEUTROPHILS NFR BLD AUTO: 68.5 % (ref 53–65)
NRBC # BLD AUTO: 0 X10E3/UL
NRBC, AUTO (.00): 0 %
PLATELET # BLD AUTO: 135 K/UL (ref 150–400)
POTASSIUM SERPL-SCNC: 3.4 MMOL/L (ref 3.5–5.1)
PROT SERPL-MCNC: 5.8 G/DL (ref 6.4–8.2)
RBC # BLD AUTO: 2.27 M/UL (ref 4.6–6.2)
SARS-COV+SARS-COV-2 AG RESP QL IA.RAPID: NEGATIVE
SODIUM SERPL-SCNC: 141 MMOL/L (ref 136–145)
WBC # BLD AUTO: 9.12 K/UL (ref 4.5–11)

## 2023-03-26 PROCEDURE — 25000003 PHARM REV CODE 250: Performed by: EMERGENCY MEDICINE

## 2023-03-26 PROCEDURE — 20000000 HC ICU ROOM

## 2023-03-26 PROCEDURE — 99291 CRITICAL CARE FIRST HOUR: CPT | Mod: ,,, | Performed by: NURSE PRACTITIONER

## 2023-03-26 PROCEDURE — 87428 SARSCOV & INF VIR A&B AG IA: CPT | Performed by: NURSE PRACTITIONER

## 2023-03-26 PROCEDURE — 87040 BLOOD CULTURE FOR BACTERIA: CPT | Performed by: NURSE PRACTITIONER

## 2023-03-26 PROCEDURE — 87205 SMEAR GRAM STAIN: CPT | Performed by: NURSE PRACTITIONER

## 2023-03-26 PROCEDURE — 99291 PR CRITICAL CARE, E/M 30-74 MINUTES: ICD-10-PCS | Mod: ,,, | Performed by: NURSE PRACTITIONER

## 2023-03-26 PROCEDURE — 80053 COMPREHEN METABOLIC PANEL: CPT | Performed by: NURSE PRACTITIONER

## 2023-03-26 PROCEDURE — 99900035 HC TECH TIME PER 15 MIN (STAT)

## 2023-03-26 PROCEDURE — 27200966 HC CLOSED SUCTION SYSTEM

## 2023-03-26 PROCEDURE — 99900026 HC AIRWAY MAINTENANCE (STAT)

## 2023-03-26 PROCEDURE — 25000003 PHARM REV CODE 250: Performed by: NURSE PRACTITIONER

## 2023-03-26 PROCEDURE — C9113 INJ PANTOPRAZOLE SODIUM, VIA: HCPCS | Performed by: NURSE PRACTITIONER

## 2023-03-26 PROCEDURE — 94003 VENT MGMT INPAT SUBQ DAY: CPT

## 2023-03-26 PROCEDURE — 63600175 PHARM REV CODE 636 W HCPCS: Performed by: HOSPITALIST

## 2023-03-26 PROCEDURE — 87070 CULTURE OTHR SPECIMN AEROBIC: CPT | Performed by: NURSE PRACTITIONER

## 2023-03-26 PROCEDURE — 27000221 HC OXYGEN, UP TO 24 HOURS

## 2023-03-26 PROCEDURE — 63600175 PHARM REV CODE 636 W HCPCS: Performed by: NURSE PRACTITIONER

## 2023-03-26 PROCEDURE — 82550 ASSAY OF CK (CPK): CPT | Performed by: INTERNAL MEDICINE

## 2023-03-26 PROCEDURE — 85025 COMPLETE CBC W/AUTO DIFF WBC: CPT | Performed by: NURSE PRACTITIONER

## 2023-03-26 PROCEDURE — 25000003 PHARM REV CODE 250: Performed by: HOSPITALIST

## 2023-03-26 PROCEDURE — 25000003 PHARM REV CODE 250: Performed by: INTERNAL MEDICINE

## 2023-03-26 PROCEDURE — 63600175 PHARM REV CODE 636 W HCPCS: Performed by: INTERNAL MEDICINE

## 2023-03-26 PROCEDURE — 94761 N-INVAS EAR/PLS OXIMETRY MLT: CPT

## 2023-03-26 RX ORDER — POLYETHYLENE GLYCOL 3350 17 G/17G
17 POWDER, FOR SOLUTION ORAL DAILY
Status: DISCONTINUED | OUTPATIENT
Start: 2023-03-26 | End: 2023-04-02

## 2023-03-26 RX ORDER — PANTOPRAZOLE SODIUM 40 MG/10ML
40 INJECTION, POWDER, LYOPHILIZED, FOR SOLUTION INTRAVENOUS DAILY
Status: DISCONTINUED | OUTPATIENT
Start: 2023-03-26 | End: 2023-04-02

## 2023-03-26 RX ORDER — IBUPROFEN 400 MG/1
800 TABLET ORAL EVERY 8 HOURS PRN
Status: DISCONTINUED | OUTPATIENT
Start: 2023-03-26 | End: 2023-04-01

## 2023-03-26 RX ORDER — ENOXAPARIN SODIUM 100 MG/ML
40 INJECTION SUBCUTANEOUS EVERY 24 HOURS
Status: DISCONTINUED | OUTPATIENT
Start: 2023-03-26 | End: 2023-04-10 | Stop reason: HOSPADM

## 2023-03-26 RX ADMIN — LORAZEPAM 2 MG: 2 INJECTION INTRAMUSCULAR; INTRAVENOUS at 05:03

## 2023-03-26 RX ADMIN — LEVETIRACETAM 500 MG: 500 TABLET, FILM COATED ORAL at 09:03

## 2023-03-26 RX ADMIN — PIPERACILLIN AND TAZOBACTAM 4.5 G: 4; .5 INJECTION, POWDER, FOR SOLUTION INTRAVENOUS; PARENTERAL at 01:03

## 2023-03-26 RX ADMIN — ENOXAPARIN SODIUM 40 MG: 100 INJECTION SUBCUTANEOUS at 04:03

## 2023-03-26 RX ADMIN — DIPHENHYDRAMINE HYDROCHLORIDE 50 MG: 50 INJECTION, SOLUTION INTRAMUSCULAR; INTRAVENOUS at 09:03

## 2023-03-26 RX ADMIN — PIPERACILLIN AND TAZOBACTAM 4.5 G: 4; .5 INJECTION, POWDER, FOR SOLUTION INTRAVENOUS; PARENTERAL at 05:03

## 2023-03-26 RX ADMIN — PROPOFOL 35 MCG/KG/MIN: 10 INJECTION, EMULSION INTRAVENOUS at 05:03

## 2023-03-26 RX ADMIN — DIPHENHYDRAMINE HYDROCHLORIDE 50 MG: 50 INJECTION, SOLUTION INTRAMUSCULAR; INTRAVENOUS at 03:03

## 2023-03-26 RX ADMIN — PROPOFOL 35 MCG/KG/MIN: 10 INJECTION, EMULSION INTRAVENOUS at 09:03

## 2023-03-26 RX ADMIN — SODIUM BICARBONATE: 84 INJECTION, SOLUTION INTRAVENOUS at 04:03

## 2023-03-26 RX ADMIN — PROPOFOL 35 MCG/KG/MIN: 10 INJECTION, EMULSION INTRAVENOUS at 06:03

## 2023-03-26 RX ADMIN — PROPOFOL 30 MCG/KG/MIN: 10 INJECTION, EMULSION INTRAVENOUS at 12:03

## 2023-03-26 RX ADMIN — IBUPROFEN 800 MG: 400 TABLET, FILM COATED ORAL at 10:03

## 2023-03-26 RX ADMIN — LORAZEPAM 2 MG: 2 INJECTION INTRAMUSCULAR; INTRAVENOUS at 01:03

## 2023-03-26 RX ADMIN — LORAZEPAM 2 MG: 2 INJECTION INTRAMUSCULAR; INTRAVENOUS at 02:03

## 2023-03-26 RX ADMIN — PANTOPRAZOLE SODIUM 40 MG: 40 INJECTION, POWDER, FOR SOLUTION INTRAVENOUS at 03:03

## 2023-03-26 RX ADMIN — PROPOFOL 35 MCG/KG/MIN: 10 INJECTION, EMULSION INTRAVENOUS at 10:03

## 2023-03-26 RX ADMIN — FENTANYL CITRATE 237.5 MCG/HR: 50 INJECTION, SOLUTION INTRAMUSCULAR; INTRAVENOUS at 11:03

## 2023-03-26 RX ADMIN — DIPHENHYDRAMINE HYDROCHLORIDE 50 MG: 50 INJECTION, SOLUTION INTRAMUSCULAR; INTRAVENOUS at 11:03

## 2023-03-26 RX ADMIN — ACETAMINOPHEN 1000 MG: 500 TABLET ORAL at 04:03

## 2023-03-26 RX ADMIN — PROPOFOL 35 MCG/KG/MIN: 10 INJECTION, EMULSION INTRAVENOUS at 03:03

## 2023-03-26 RX ADMIN — MUPIROCIN: 20 OINTMENT TOPICAL at 09:03

## 2023-03-26 RX ADMIN — POLYETHYLENE GLYCOL 3350 17 G: 17 POWDER, FOR SOLUTION ORAL at 11:03

## 2023-03-26 RX ADMIN — LORAZEPAM 2 MG: 2 INJECTION INTRAMUSCULAR; INTRAVENOUS at 11:03

## 2023-03-26 RX ADMIN — FENTANYL CITRATE 225 MCG/HR: 50 INJECTION, SOLUTION INTRAMUSCULAR; INTRAVENOUS at 01:03

## 2023-03-26 RX ADMIN — PROPOFOL 45 MCG/KG/MIN: 10 INJECTION, EMULSION INTRAVENOUS at 02:03

## 2023-03-26 RX ADMIN — ACETAMINOPHEN 1000 MG: 500 TABLET ORAL at 09:03

## 2023-03-26 RX ADMIN — NOREPINEPHRINE BITARTRATE 0.08 MCG/KG/MIN: 4 INJECTION, SOLUTION INTRAVENOUS at 01:03

## 2023-03-26 RX ADMIN — LORAZEPAM 2 MG: 2 INJECTION INTRAMUSCULAR; INTRAVENOUS at 09:03

## 2023-03-26 RX ADMIN — NOREPINEPHRINE BITARTRATE 0.08 MCG/KG/MIN: 4 INJECTION, SOLUTION INTRAVENOUS at 04:03

## 2023-03-26 RX ADMIN — ASPIRIN 81 MG: 81 TABLET, DELAYED RELEASE ORAL at 09:03

## 2023-03-26 RX ADMIN — PIPERACILLIN AND TAZOBACTAM 4.5 G: 4; .5 INJECTION, POWDER, FOR SOLUTION INTRAVENOUS; PARENTERAL at 09:03

## 2023-03-26 NOTE — ASSESSMENT & PLAN NOTE
Related to rhabdo  Creatinine is 1.84 today. 4.37 on admit. UOP increased in last 24 hours 6950cc.. unsure if this is accurate. Only 150cc in last 4 hours today.

## 2023-03-26 NOTE — NURSING
Diprivan and fentanyl titrated per md order to maintain a rass of -2. Pt. Is restless at this time. See RASS scores.

## 2023-03-26 NOTE — NURSING
Levophed weaned down per md order to maintain a map above 60mm/hg. Present bp is 128/65 with a map of 86.

## 2023-03-26 NOTE — ASSESSMENT & PLAN NOTE
Uncertain of the cause but certainly will stop any medicines that looks like it could be due to antipsychotics we stopped all that use Ativan we may consider dantrolene if it continues  3/25- cultures negative. Likely related to withdrawal   03/26- fever continues Tmax 102.7 --- will re-culture, chest xray, venous dopplers, covid/flu -- if no source identified will likely give dantrolene - no leucocytosis or bandemia noted

## 2023-03-26 NOTE — PROGRESS NOTES
[facility]  Pulmonology  Progress Note    Patient Name: Carol Ladd Jr.  MRN: [id number]  Admission Date: 3/21/2023  Hospital Length of Stay: 5 days  Code Status: No Order  Attending Provider: Des Navarro MD  Primary Care Provider: Raymundo Cabrera MD   Principal Problem: Alcohol withdrawal syndrome with complication    Subjective:     Interval History: intubated, sedated, will wake up, continued to run fever       Objective:     Vital Signs (Most Recent):  Temp: (!) 102.6 °F (39.2 °C) (03/26/23 1030)  Pulse: 108 (03/26/23 1030)  Resp: 13 (03/26/23 1030)  BP: (!) 93/43 (03/26/23 1030)  SpO2: 96 % (03/26/23 1030)   Vital Signs (24h Range):  Temp:  [98.4 °F (36.9 °C)-102.7 °F (39.3 °C)] 102.6 °F (39.2 °C)  Pulse:  [] 108  Resp:  [9-19] 13  SpO2:  [87 %-100 %] 96 %  BP: ()/(33-95) 93/43     Weight: 117 kg (257 lb 15 oz)  Body mass index is 34.98 kg/m².      Intake/Output Summary (Last 24 hours) at 3/26/2023 1049  Last data filed at 3/26/2023 1000  Gross per 24 hour   Intake 7401.31 ml   Output 5395 ml   Net 2006.31 ml       Physical Exam  Vitals reviewed.   Constitutional:       General: He is not in acute distress.     Appearance: He is ill-appearing.      Interventions: He is sedated and intubated.   HENT:      Head: Normocephalic and atraumatic.      Right Ear: External ear normal.      Left Ear: External ear normal.      Mouth/Throat:      Mouth: Mucous membranes are dry.      Pharynx: Oropharynx is clear.   Eyes:      Extraocular Movements: Extraocular movements intact.      Conjunctiva/sclera: Conjunctivae normal.   Cardiovascular:      Rate and Rhythm: Normal rate and regular rhythm.   Pulmonary:      Effort: Pulmonary effort is normal. He is intubated.      Breath sounds: Normal breath sounds. No wheezing or rales.      Comments: Clear breath sounds anteriorly on mechanical ventilation  Abdominal:      General: Bowel sounds are normal.      Palpations: Abdomen is  soft.      Tenderness: There is no abdominal tenderness.   Musculoskeletal:         General: Normal range of motion.      Cervical back: Normal range of motion and neck supple.   Skin:     General: Skin is warm and dry.      Capillary Refill: Capillary refill takes less than 2 seconds.   Neurological:      Comments: Currently intubated and sedated     Review of Systems    Vents:  Vent Mode: A/C (03/26/23 0808)  Ventilator Initiated: Yes (03/23/23 0000)  Set Rate: 12 BPM (03/26/23 0808)  Vt Set: 500 mL (03/26/23 0808)  PEEP/CPAP: 5 cmH20 (03/26/23 0808)  Oxygen Concentration (%): 50 (03/26/23 0808)  Peak Airway Pressure: 20 cmH20 (03/26/23 0808)  Total Ve: 8.5 L/m (03/26/23 0808)  F/VT Ratio<105 (RSBI): (!) 12.5 (03/25/23 1500)    Lines/Drains/Airways       Drain  Duration                  NG/OG Tube 03/23/23 0014 [place] sump Right nostril 3 days         Urethral Catheter 03/23/23 1000 Non-latex 16 Fr. 3 days              Airway  Duration                  Airway - Non-Surgical 03/22/23 2358 Endotracheal Tube 3 days              Peripheral Intravenous Line  Duration                  Peripheral IV - Single Lumen 03/23/23 0015 20 G Left;Posterior Forearm 3 days         Peripheral IV - Single Lumen 03/23/23 0330 20 G Anterior;Right Forearm 3 days         Peripheral IV - Single Lumen 03/25/23 1929 20 G Anterior;Left;Proximal Forearm <1 day         Peripheral IV - Single Lumen 03/25/23 1935 20 G Anterior;Proximal;Right Forearm <1 day         Peripheral IV - Single Lumen 03/26/23 0422 20 G Anterior;Left Upper Arm <1 day                    Significant Labs:    CBC/Anemia Profile:  Recent Labs   Lab 03/25/23  0754 03/26/23  0445   WBC 8.60 9.12   HGB 8.2* 8.1*   HCT 23.9* 25.5*   * 135*   .2* 112.3*   RDW 15.6* 15.9*        Chemistries:  Recent Labs   Lab 03/25/23  0442 03/26/23  0445    141   K 3.3* 3.4*   CL 90* 93*   CO2 39* 42*   BUN 21* 16   CREATININE 1.87* 1.84*   CALCIUM 7.0* 7.1*   ALBUMIN 2.9*  2.6*   PROT 5.9* 5.8*   BILITOT 1.1 0.9   ALKPHOS 61 62   ALT 65* 55   * 76*       All pertinent labs within the past 24 hours have been reviewed.    Significant Imaging:  I have reviewed all pertinent imaging results/findings within the past 24 hours.    Assessment/Plan:     Neuro  AMS (altered mental status)  Currently sedated on vent       Tourette syndrome  Currently stable with sedation his tics are completely gone    Psychiatric  * Alcohol withdrawal syndrome with complication  Required intubation and sedation  Last drink was 3/19 so still right in the middle of possible DT's  Continue with sedation for now      Cardiac/Vascular  Elevated troponin level not due myocardial infarction  Seen by cardiology     HTN (hypertension)  Blood pressure is better with sedation    Renal/  Acute kidney injury  Related to rhabdo  Creatinine is 1.84 today. 4.37 on admit. UOP increased in last 24 hours 6950cc.. unsure if this is accurate. Only 150cc in last 4 hours today.       Orthopedic  Rhabdomyolysis  Improving  CK is  Down to 1747 today     Other  Fever  Uncertain of the cause but certainly will stop any medicines that looks like it could be due to antipsychotics we stopped all that use Ativan we may consider dantrolene if it continues  3/25- cultures negative. Likely related to withdrawal   03/26- fever continues Tmax 102.7 --- will re-culture, chest xray, venous dopplers, covid/flu -- if no source identified will likely give dantrolene - no leucocytosis or bandemia noted       This includes 45 minutes of critical care time spent evaluating and managing patient. This includes time spent at bedside, reviewing labs, data, xrays and monitoring for acute decompensation.             Manuela Bhatia, AG-ACNP  Pulmonology  [facility]

## 2023-03-26 NOTE — PLAN OF CARE
Problem: Inability to Wean (Mechanical Ventilation, Invasive)  Goal: Mechanical Ventilation Liberation  Outcome: Ongoing, Progressing     Problem: Ventilator-Induced Lung Injury (Mechanical Ventilation, Invasive)  Goal: Absence of Ventilator-Induced Lung Injury  Outcome: Ongoing, Progressing     Problem: Communication Impairment (Artificial Airway)  Goal: Effective Communication  Outcome: Ongoing, Progressing     Problem: Device-Related Complication Risk (Artificial Airway)  Goal: Optimal Device Function  Outcome: Ongoing, Progressing     Problem: Breathing Pattern Ineffective  Goal: Effective Breathing Pattern  Outcome: Ongoing, Progressing     Problem: Gas Exchange Impaired  Goal: Optimal Gas Exchange  Outcome: Ongoing, Progressing

## 2023-03-26 NOTE — SUBJECTIVE & OBJECTIVE
Interval History: intubated, sedated, will wake up, continued to run fever       Objective:     Vital Signs (Most Recent):  Temp: (!) 102.6 °F (39.2 °C) (03/26/23 1030)  Pulse: 108 (03/26/23 1030)  Resp: 13 (03/26/23 1030)  BP: (!) 93/43 (03/26/23 1030)  SpO2: 96 % (03/26/23 1030)   Vital Signs (24h Range):  Temp:  [98.4 °F (36.9 °C)-102.7 °F (39.3 °C)] 102.6 °F (39.2 °C)  Pulse:  [] 108  Resp:  [9-19] 13  SpO2:  [87 %-100 %] 96 %  BP: ()/(33-95) 93/43     Weight: 117 kg (257 lb 15 oz)  Body mass index is 34.98 kg/m².      Intake/Output Summary (Last 24 hours) at 3/26/2023 1049  Last data filed at 3/26/2023 1000  Gross per 24 hour   Intake 7401.31 ml   Output 5395 ml   Net 2006.31 ml       Physical Exam  Vitals reviewed.   Constitutional:       General: He is not in acute distress.     Appearance: He is ill-appearing.      Interventions: He is sedated and intubated.   HENT:      Head: Normocephalic and atraumatic.      Right Ear: External ear normal.      Left Ear: External ear normal.      Mouth/Throat:      Mouth: Mucous membranes are dry.      Pharynx: Oropharynx is clear.   Eyes:      Extraocular Movements: Extraocular movements intact.      Conjunctiva/sclera: Conjunctivae normal.   Cardiovascular:      Rate and Rhythm: Normal rate and regular rhythm.   Pulmonary:      Effort: Pulmonary effort is normal. He is intubated.      Breath sounds: Normal breath sounds. No wheezing or rales.      Comments: Clear breath sounds anteriorly on mechanical ventilation  Abdominal:      General: Bowel sounds are normal.      Palpations: Abdomen is soft.      Tenderness: There is no abdominal tenderness.   Musculoskeletal:         General: Normal range of motion.      Cervical back: Normal range of motion and neck supple.   Skin:     General: Skin is warm and dry.      Capillary Refill: Capillary refill takes less than 2 seconds.   Neurological:      Comments: Currently intubated and sedated     Review of  Systems    Vents:  Vent Mode: A/C (03/26/23 0808)  Ventilator Initiated: Yes (03/23/23 0000)  Set Rate: 12 BPM (03/26/23 0808)  Vt Set: 500 mL (03/26/23 0808)  PEEP/CPAP: 5 cmH20 (03/26/23 0808)  Oxygen Concentration (%): 50 (03/26/23 0808)  Peak Airway Pressure: 20 cmH20 (03/26/23 0808)  Total Ve: 8.5 L/m (03/26/23 0808)  F/VT Ratio<105 (RSBI): (!) 12.5 (03/25/23 1500)    Lines/Drains/Airways       Drain  Duration                  NG/OG Tube 03/23/23 0014 [place] sump Right nostril 3 days         Urethral Catheter 03/23/23 1000 Non-latex 16 Fr. 3 days              Airway  Duration                  Airway - Non-Surgical 03/22/23 2358 Endotracheal Tube 3 days              Peripheral Intravenous Line  Duration                  Peripheral IV - Single Lumen 03/23/23 0015 20 G Left;Posterior Forearm 3 days         Peripheral IV - Single Lumen 03/23/23 0330 20 G Anterior;Right Forearm 3 days         Peripheral IV - Single Lumen 03/25/23 1929 20 G Anterior;Left;Proximal Forearm <1 day         Peripheral IV - Single Lumen 03/25/23 1935 20 G Anterior;Proximal;Right Forearm <1 day         Peripheral IV - Single Lumen 03/26/23 0422 20 G Anterior;Left Upper Arm <1 day                    Significant Labs:    CBC/Anemia Profile:  Recent Labs   Lab 03/25/23  0754 03/26/23  0445   WBC 8.60 9.12   HGB 8.2* 8.1*   HCT 23.9* 25.5*   * 135*   .2* 112.3*   RDW 15.6* 15.9*        Chemistries:  Recent Labs   Lab 03/25/23  0442 03/26/23  0445    141   K 3.3* 3.4*   CL 90* 93*   CO2 39* 42*   BUN 21* 16   CREATININE 1.87* 1.84*   CALCIUM 7.0* 7.1*   ALBUMIN 2.9* 2.6*   PROT 5.9* 5.8*   BILITOT 1.1 0.9   ALKPHOS 61 62   ALT 65* 55   * 76*       All pertinent labs within the past 24 hours have been reviewed.    Significant Imaging:  I have reviewed all pertinent imaging results/findings within the past 24 hours.   Statement Selected

## 2023-03-26 NOTE — PLAN OF CARE
Problem: Infection  Goal: Absence of Infection Signs and Symptoms  Outcome: Ongoing, Progressing  Intervention: Prevent or Manage Infection  Flowsheets (Taken 3/26/2023 1506)  Fever Reduction/Comfort Measures:   ice pack(s) applied   lightweight bedding  Infection Management: aseptic technique maintained     Problem: Adult Inpatient Plan of Care  Goal: Plan of Care Review  Outcome: Ongoing, Progressing  Goal: Patient-Specific Goal (Individualized)  Outcome: Ongoing, Progressing  Goal: Absence of Hospital-Acquired Illness or Injury  Outcome: Ongoing, Progressing  Goal: Optimal Comfort and Wellbeing  Outcome: Ongoing, Progressing  Intervention: Monitor Pain and Promote Comfort  Flowsheets (Taken 3/26/2023 1506)  Pain Management Interventions:   relaxation techniques promoted   quiet environment facilitated   pillow support provided   position adjusted   around-the-clock dosing utilized   care clustered  Intervention: Provide Person-Centered Care  Flowsheets (Taken 3/26/2023 1506)  Trust Relationship/Rapport:   care explained   reassurance provided  Goal: Readiness for Transition of Care  Outcome: Ongoing, Progressing     Problem: Fluid and Electrolyte Imbalance (Acute Kidney Injury/Impairment)  Goal: Fluid and Electrolyte Balance  Outcome: Ongoing, Progressing     Problem: Oral Intake Inadequate (Acute Kidney Injury/Impairment)  Goal: Optimal Nutrition Intake  Outcome: Ongoing, Progressing     Problem: Renal Function Impairment (Acute Kidney Injury/Impairment)  Goal: Effective Renal Function  Outcome: Ongoing, Progressing     Problem: Skin Injury Risk Increased  Goal: Skin Health and Integrity  Outcome: Ongoing, Progressing     Problem: Fall Injury Risk  Goal: Absence of Fall and Fall-Related Injury  Outcome: Ongoing, Progressing     Problem: Restraint, Nonbehavioral (Nonviolent)  Goal: Absence of Harm or Injury  Outcome: Ongoing, Progressing  Intervention: Implement Least Restrictive Safety Strategies  Flowsheets  (Taken 3/26/2023 1506)  Less Restrictive Alternative:   bed alarm in use   calming techniques promoted   environment adjusted   family presence promoted   sensory stimulation limited   safety enhancements provided  De-Escalation Techniques:   diversional activity encouraged   family involvement requested   medication administered   reoriented   stimulation decreased   verbally redirected  Intervention: Protect Dignity, Rights, and Personal Wellbeing  Flowsheets (Taken 3/26/2023 1506)  Trust Relationship/Rapport:   care explained   reassurance provided     Problem: Communication Impairment (Mechanical Ventilation, Invasive)  Goal: Effective Communication  Outcome: Ongoing, Progressing     Problem: Device-Related Complication Risk (Mechanical Ventilation, Invasive)  Goal: Optimal Device Function  Outcome: Ongoing, Progressing     Problem: Inability to Wean (Mechanical Ventilation, Invasive)  Goal: Mechanical Ventilation Liberation  Outcome: Ongoing, Progressing     Problem: Nutrition Impairment (Mechanical Ventilation, Invasive)  Goal: Optimal Nutrition Delivery  Outcome: Ongoing, Progressing     Problem: Skin and Tissue Injury (Mechanical Ventilation, Invasive)  Goal: Absence of Device-Related Skin and Tissue Injury  Outcome: Ongoing, Progressing     Problem: Ventilator-Induced Lung Injury (Mechanical Ventilation, Invasive)  Goal: Absence of Ventilator-Induced Lung Injury  Outcome: Ongoing, Progressing     Problem: Communication Impairment (Artificial Airway)  Goal: Effective Communication  Outcome: Ongoing, Progressing     Problem: Device-Related Complication Risk (Artificial Airway)  Goal: Optimal Device Function  Outcome: Ongoing, Progressing     Problem: Skin and Tissue Injury (Artificial Airway)  Goal: Absence of Device-Related Skin or Tissue Injury  Outcome: Ongoing, Progressing     Problem: Noninvasive Ventilation Acute  Goal: Effective Unassisted Ventilation and Oxygenation  Outcome: Ongoing, Progressing      Problem: Impaired Wound Healing  Goal: Optimal Wound Healing  Outcome: Ongoing, Progressing     Problem: Breathing Pattern Ineffective  Goal: Effective Breathing Pattern  Outcome: Ongoing, Progressing     Problem: Gas Exchange Impaired  Goal: Optimal Gas Exchange  Outcome: Ongoing, Progressing

## 2023-03-27 PROBLEM — J96.01 ACUTE RESPIRATORY FAILURE WITH HYPOXIA AND HYPERCARBIA: Status: ACTIVE | Noted: 2023-03-27

## 2023-03-27 PROBLEM — J96.02 ACUTE RESPIRATORY FAILURE WITH HYPOXIA AND HYPERCARBIA: Status: ACTIVE | Noted: 2023-03-27

## 2023-03-27 LAB
ALBUMIN SERPL BCP-MCNC: 2.6 G/DL (ref 3.5–5)
ALBUMIN/GLOB SERPL: 0.6 {RATIO}
ALP SERPL-CCNC: 68 U/L (ref 45–115)
ALT SERPL W P-5'-P-CCNC: 48 U/L (ref 16–61)
ANION GAP SERPL CALCULATED.3IONS-SCNC: 7 MMOL/L (ref 7–16)
ANION GAP SERPL CALCULATED.3IONS-SCNC: 8 MMOL/L (ref 7–16)
ANISOCYTOSIS BLD QL SMEAR: ABNORMAL
AST SERPL W P-5'-P-CCNC: 51 U/L (ref 15–37)
BACTERIA BLD CULT: NORMAL
BACTERIA BLD CULT: NORMAL
BASOPHILS # BLD AUTO: 0.04 K/UL (ref 0–0.2)
BASOPHILS NFR BLD AUTO: 0.4 % (ref 0–1)
BILIRUB SERPL-MCNC: 1 MG/DL (ref ?–1.2)
BUN SERPL-MCNC: 24 MG/DL (ref 7–18)
BUN SERPL-MCNC: 27 MG/DL (ref 7–18)
BUN/CREAT SERPL: 10 (ref 6–20)
BUN/CREAT SERPL: 8 (ref 6–20)
CALCIUM SERPL-MCNC: 7.2 MG/DL (ref 8.5–10.1)
CALCIUM SERPL-MCNC: 7.6 MG/DL (ref 8.5–10.1)
CHLORIDE SERPL-SCNC: 88 MMOL/L (ref 98–107)
CHLORIDE SERPL-SCNC: 90 MMOL/L (ref 98–107)
CK SERPL-CCNC: 1154 U/L (ref 39–308)
CO2 SERPL-SCNC: 45 MMOL/L (ref 21–32)
CO2 SERPL-SCNC: 45 MMOL/L (ref 21–32)
CREAT SERPL-MCNC: 2.83 MG/DL (ref 0.7–1.3)
CREAT SERPL-MCNC: 3.19 MG/DL (ref 0.7–1.3)
DIFFERENTIAL METHOD BLD: ABNORMAL
EGFR (NO RACE VARIABLE) (RUSH/TITUS): 24 ML/MIN/1.73M²
EGFR (NO RACE VARIABLE) (RUSH/TITUS): 27 ML/MIN/1.73M²
EOSINOPHIL # BLD AUTO: 0.3 K/UL (ref 0–0.5)
EOSINOPHIL NFR BLD AUTO: 3.3 % (ref 1–4)
ERYTHROCYTE [DISTWIDTH] IN BLOOD BY AUTOMATED COUNT: 15.3 % (ref 11.5–14.5)
GLOBULIN SER-MCNC: 4.5 G/DL (ref 2–4)
GLUCOSE SERPL-MCNC: 101 MG/DL (ref 74–106)
GLUCOSE SERPL-MCNC: 107 MG/DL (ref 74–106)
HCT VFR BLD AUTO: 22.6 % (ref 40–54)
HGB BLD-MCNC: 7.3 G/DL (ref 13.5–18)
HYPOCHROMIA BLD QL SMEAR: ABNORMAL
IMM GRANULOCYTES # BLD AUTO: 0.1 K/UL (ref 0–0.04)
IMM GRANULOCYTES NFR BLD: 1.1 % (ref 0–0.4)
LYMPHOCYTES # BLD AUTO: 1.19 K/UL (ref 1–4.8)
LYMPHOCYTES NFR BLD AUTO: 12.9 % (ref 27–41)
MACROCYTES BLD QL SMEAR: ABNORMAL
MCH RBC QN AUTO: 35.3 PG (ref 27–31)
MCHC RBC AUTO-ENTMCNC: 32.3 G/DL (ref 32–36)
MCV RBC AUTO: 109.2 FL (ref 80–96)
MONOCYTES # BLD AUTO: 1.65 K/UL (ref 0–0.8)
MONOCYTES NFR BLD AUTO: 17.9 % (ref 2–6)
MPC BLD CALC-MCNC: 9.6 FL (ref 9.4–12.4)
NEUTROPHILS # BLD AUTO: 5.94 K/UL (ref 1.8–7.7)
NEUTROPHILS NFR BLD AUTO: 64.4 % (ref 53–65)
NRBC # BLD AUTO: 0 X10E3/UL
NRBC, AUTO (.00): 0 %
OVALOCYTES BLD QL SMEAR: ABNORMAL
PLATELET # BLD AUTO: 244 K/UL (ref 150–400)
PLATELET MORPHOLOGY: ABNORMAL
POLYCHROMASIA BLD QL SMEAR: ABNORMAL
POTASSIUM SERPL-SCNC: 3.2 MMOL/L (ref 3.5–5.1)
POTASSIUM SERPL-SCNC: 3.5 MMOL/L (ref 3.5–5.1)
PROT SERPL-MCNC: 7.1 G/DL (ref 6.4–8.2)
RBC # BLD AUTO: 2.07 M/UL (ref 4.6–6.2)
SODIUM SERPL-SCNC: 138 MMOL/L (ref 136–145)
SODIUM SERPL-SCNC: 138 MMOL/L (ref 136–145)
STOMATOCYTES BLD QL SMEAR: ABNORMAL
WBC # BLD AUTO: 9.22 K/UL (ref 4.5–11)

## 2023-03-27 PROCEDURE — 99900026 HC AIRWAY MAINTENANCE (STAT)

## 2023-03-27 PROCEDURE — C9113 INJ PANTOPRAZOLE SODIUM, VIA: HCPCS | Performed by: NURSE PRACTITIONER

## 2023-03-27 PROCEDURE — 25000003 PHARM REV CODE 250: Performed by: NURSE PRACTITIONER

## 2023-03-27 PROCEDURE — 20000000 HC ICU ROOM

## 2023-03-27 PROCEDURE — 99233 PR SUBSEQUENT HOSPITAL CARE,LEVL III: ICD-10-PCS | Mod: ,,, | Performed by: INTERNAL MEDICINE

## 2023-03-27 PROCEDURE — 99233 SBSQ HOSP IP/OBS HIGH 50: CPT | Mod: ,,, | Performed by: INTERNAL MEDICINE

## 2023-03-27 PROCEDURE — 27200966 HC CLOSED SUCTION SYSTEM

## 2023-03-27 PROCEDURE — 80048 BASIC METABOLIC PNL TOTAL CA: CPT | Mod: XB | Performed by: NURSE PRACTITIONER

## 2023-03-27 PROCEDURE — 63600175 PHARM REV CODE 636 W HCPCS: Performed by: NURSE PRACTITIONER

## 2023-03-27 PROCEDURE — 27000221 HC OXYGEN, UP TO 24 HOURS

## 2023-03-27 PROCEDURE — 25000003 PHARM REV CODE 250

## 2023-03-27 PROCEDURE — 82550 ASSAY OF CK (CPK): CPT | Performed by: INTERNAL MEDICINE

## 2023-03-27 PROCEDURE — 80053 COMPREHEN METABOLIC PANEL: CPT | Performed by: NURSE PRACTITIONER

## 2023-03-27 PROCEDURE — 25000003 PHARM REV CODE 250: Performed by: INTERNAL MEDICINE

## 2023-03-27 PROCEDURE — 94003 VENT MGMT INPAT SUBQ DAY: CPT

## 2023-03-27 PROCEDURE — 25000003 PHARM REV CODE 250: Performed by: HOSPITALIST

## 2023-03-27 PROCEDURE — 63600175 PHARM REV CODE 636 W HCPCS: Performed by: INTERNAL MEDICINE

## 2023-03-27 PROCEDURE — 85025 COMPLETE CBC W/AUTO DIFF WBC: CPT | Performed by: NURSE PRACTITIONER

## 2023-03-27 PROCEDURE — 94761 N-INVAS EAR/PLS OXIMETRY MLT: CPT

## 2023-03-27 PROCEDURE — 63600175 PHARM REV CODE 636 W HCPCS: Performed by: HOSPITALIST

## 2023-03-27 PROCEDURE — 99900035 HC TECH TIME PER 15 MIN (STAT)

## 2023-03-27 RX ORDER — SODIUM CHLORIDE, SODIUM GLUCONATE, SODIUM ACETATE, POTASSIUM CHLORIDE AND MAGNESIUM CHLORIDE 30; 37; 368; 526; 502 MG/100ML; MG/100ML; MG/100ML; MG/100ML; MG/100ML
INJECTION, SOLUTION INTRAVENOUS CONTINUOUS
Status: DISCONTINUED | OUTPATIENT
Start: 2023-03-27 | End: 2023-03-27

## 2023-03-27 RX ORDER — SODIUM CHLORIDE 9 MG/ML
INJECTION, SOLUTION INTRAVENOUS
Status: COMPLETED
Start: 2023-03-27 | End: 2023-03-27

## 2023-03-27 RX ORDER — SODIUM CHLORIDE 9 MG/ML
INJECTION, SOLUTION INTRAVENOUS CONTINUOUS
Status: DISCONTINUED | OUTPATIENT
Start: 2023-03-27 | End: 2023-03-28

## 2023-03-27 RX ADMIN — SODIUM CHLORIDE 75 ML/HR: 9 INJECTION, SOLUTION INTRAVENOUS at 01:03

## 2023-03-27 RX ADMIN — PIPERACILLIN AND TAZOBACTAM 4.5 G: 4; .5 INJECTION, POWDER, FOR SOLUTION INTRAVENOUS; PARENTERAL at 05:03

## 2023-03-27 RX ADMIN — PROPOFOL 40 MCG/KG/MIN: 10 INJECTION, EMULSION INTRAVENOUS at 12:03

## 2023-03-27 RX ADMIN — PANTOPRAZOLE SODIUM 40 MG: 40 INJECTION, POWDER, FOR SOLUTION INTRAVENOUS at 08:03

## 2023-03-27 RX ADMIN — PROPOFOL 40 MCG/KG/MIN: 10 INJECTION, EMULSION INTRAVENOUS at 06:03

## 2023-03-27 RX ADMIN — LORAZEPAM 2 MG: 2 INJECTION INTRAMUSCULAR; INTRAVENOUS at 12:03

## 2023-03-27 RX ADMIN — NOREPINEPHRINE BITARTRATE 0.06 MCG/KG/MIN: 4 INJECTION, SOLUTION INTRAVENOUS at 12:03

## 2023-03-27 RX ADMIN — DIPHENHYDRAMINE HYDROCHLORIDE 50 MG: 50 INJECTION, SOLUTION INTRAMUSCULAR; INTRAVENOUS at 04:03

## 2023-03-27 RX ADMIN — POLYETHYLENE GLYCOL 3350 17 G: 17 POWDER, FOR SOLUTION ORAL at 08:03

## 2023-03-27 RX ADMIN — IBUPROFEN 800 MG: 400 TABLET, FILM COATED ORAL at 12:03

## 2023-03-27 RX ADMIN — IBUPROFEN 800 MG: 400 TABLET, FILM COATED ORAL at 10:03

## 2023-03-27 RX ADMIN — LORAZEPAM 2 MG: 2 INJECTION INTRAMUSCULAR; INTRAVENOUS at 09:03

## 2023-03-27 RX ADMIN — SODIUM CHLORIDE: 9 INJECTION, SOLUTION INTRAVENOUS at 10:03

## 2023-03-27 RX ADMIN — FENTANYL CITRATE 237.5 MCG/HR: 50 INJECTION, SOLUTION INTRAMUSCULAR; INTRAVENOUS at 08:03

## 2023-03-27 RX ADMIN — LEVETIRACETAM 500 MG: 500 TABLET, FILM COATED ORAL at 08:03

## 2023-03-27 RX ADMIN — PIPERACILLIN AND TAZOBACTAM 4.5 G: 4; .5 INJECTION, POWDER, FOR SOLUTION INTRAVENOUS; PARENTERAL at 01:03

## 2023-03-27 RX ADMIN — ASPIRIN 81 MG: 81 TABLET, DELAYED RELEASE ORAL at 08:03

## 2023-03-27 RX ADMIN — ENOXAPARIN SODIUM 40 MG: 100 INJECTION SUBCUTANEOUS at 04:03

## 2023-03-27 RX ADMIN — FENTANYL CITRATE 237.5 MCG/HR: 50 INJECTION, SOLUTION INTRAMUSCULAR; INTRAVENOUS at 07:03

## 2023-03-27 RX ADMIN — LORAZEPAM 2 MG: 2 INJECTION INTRAMUSCULAR; INTRAVENOUS at 06:03

## 2023-03-27 RX ADMIN — LEVETIRACETAM 500 MG: 500 TABLET, FILM COATED ORAL at 09:03

## 2023-03-27 RX ADMIN — PROPOFOL 40 MCG/KG/MIN: 10 INJECTION, EMULSION INTRAVENOUS at 03:03

## 2023-03-27 RX ADMIN — LORAZEPAM 2 MG: 2 INJECTION INTRAMUSCULAR; INTRAVENOUS at 03:03

## 2023-03-27 RX ADMIN — LORAZEPAM 2 MG: 2 INJECTION INTRAMUSCULAR; INTRAVENOUS at 10:03

## 2023-03-27 RX ADMIN — DIPHENHYDRAMINE HYDROCHLORIDE 50 MG: 50 INJECTION, SOLUTION INTRAMUSCULAR; INTRAVENOUS at 09:03

## 2023-03-27 RX ADMIN — PIPERACILLIN AND TAZOBACTAM 4.5 G: 4; .5 INJECTION, POWDER, FOR SOLUTION INTRAVENOUS; PARENTERAL at 09:03

## 2023-03-27 RX ADMIN — PROPOFOL 40 MCG/KG/MIN: 10 INJECTION, EMULSION INTRAVENOUS at 09:03

## 2023-03-27 RX ADMIN — PROPOFOL 40 MCG/KG/MIN: 10 INJECTION, EMULSION INTRAVENOUS at 08:03

## 2023-03-27 RX ADMIN — IBUPROFEN 800 MG: 400 TABLET, FILM COATED ORAL at 06:03

## 2023-03-27 RX ADMIN — SODIUM CHLORIDE, SODIUM GLUCONATE, SODIUM ACETATE, POTASSIUM CHLORIDE AND MAGNESIUM CHLORIDE 500 ML/HR: 526; 502; 368; 37; 30 INJECTION, SOLUTION INTRAVENOUS at 10:03

## 2023-03-27 RX ADMIN — PROPOFOL 40 MCG/KG/MIN: 10 INJECTION, EMULSION INTRAVENOUS at 04:03

## 2023-03-27 NOTE — PROGRESS NOTES
[facility]  Pulmonology  Progress Note    Patient Name: Carol Ladd Jr.  MRN: [id number]  Admission Date: 3/21/2023  Hospital Length of Stay: 6 days  Code Status: No Order  Attending Provider: Des Navarro MD  Primary Care Provider: Raymundo Cabrera MD   Principal Problem: Alcohol withdrawal syndrome with complication    Subjective:     Interval History:  Patient without complaints      Objective:     Vital Signs (Most Recent):  Temp: 99.3 °F (37.4 °C) (03/27/23 0534)  Pulse: 83 (03/27/23 0534)  Resp: 16 (03/27/23 0534)  BP: 126/60 (03/27/23 0534)  SpO2: 97 % (03/27/23 0534) Vital Signs (24h Range):  Temp:  [98.4 °F (36.9 °C)-102.6 °F (39.2 °C)] 99.3 °F (37.4 °C)  Pulse:  [] 83  Resp:  [10-17] 16  SpO2:  [81 %-100 %] 97 %  BP: ()/(34-71) 126/60     Weight: 116.8 kg (257 lb 8 oz)  Body mass index is 34.92 kg/m².      Intake/Output Summary (Last 24 hours) at 3/27/2023 0559  Last data filed at 3/27/2023 0540  Gross per 24 hour   Intake 5470.81 ml   Output 1680 ml   Net 3790.81 ml       Physical Exam  Vitals reviewed.   Constitutional:       Appearance: Normal appearance.      Interventions: He is sedated and intubated.   HENT:      Head: Normocephalic and atraumatic.      Nose: Nose normal.      Mouth/Throat:      Mouth: Mucous membranes are dry.      Pharynx: Oropharynx is clear.   Eyes:      Extraocular Movements: Extraocular movements intact.      Conjunctiva/sclera: Conjunctivae normal.      Pupils: Pupils are equal, round, and reactive to light.   Cardiovascular:      Rate and Rhythm: Normal rate.      Heart sounds: Normal heart sounds. No murmur heard.  Pulmonary:      Effort: Pulmonary effort is normal. He is intubated.      Breath sounds: Normal breath sounds.   Abdominal:      General: Abdomen is flat. Bowel sounds are normal.      Palpations: Abdomen is soft.   Musculoskeletal:         General: Normal range of motion.      Cervical back: Normal range of motion  and neck supple.      Right lower leg: No edema.      Left lower leg: No edema.   Skin:     General: Skin is warm and dry.      Capillary Refill: Capillary refill takes less than 2 seconds.   Neurological:      General: No focal deficit present.      Mental Status: He is alert and oriented to person, place, and time.   Psychiatric:         Mood and Affect: Mood normal.         Behavior: Behavior normal.     Review of Systems    Vents:  Vent Mode: A/C (03/27/23 0534)  Ventilator Initiated: Yes (03/23/23 0000)  Set Rate: 12 BPM (03/27/23 0534)  Vt Set: 500 mL (03/27/23 0534)  PEEP/CPAP: 5 cmH20 (03/27/23 0534)  Oxygen Concentration (%): 45 (03/27/23 0534)  Peak Airway Pressure: 41 cmH20 (03/27/23 0534)  Total Ve: 8.5 L/m (03/27/23 0534)  F/VT Ratio<105 (RSBI): (!) 18.37 (03/27/23 0534)    Lines/Drains/Airways       Drain  Duration                  NG/OG Tube 03/23/23 0014 [place] sump Right nostril 4 days         Urethral Catheter 03/23/23 1000 Non-latex 16 Fr. 3 days              Airway  Duration                  Airway - Non-Surgical 03/22/23 2358 Endotracheal Tube 4 days              Peripheral Intravenous Line  Duration                  Peripheral IV - Single Lumen 03/23/23 0015 20 G Left;Posterior Forearm 4 days         Peripheral IV - Single Lumen 03/25/23 1929 20 G Anterior;Left;Proximal Forearm 1 day         Peripheral IV - Single Lumen 03/25/23 1935 20 G Anterior;Proximal;Right Forearm 1 day         Peripheral IV - Single Lumen 03/26/23 0422 20 G Anterior;Left Upper Arm 1 day                    Significant Labs:    CBC/Anemia Profile:  Recent Labs   Lab 03/25/23  0754 03/26/23  0445   WBC 8.60 9.12   HGB 8.2* 8.1*   HCT 23.9* 25.5*   * 135*   .2* 112.3*   RDW 15.6* 15.9*        Chemistries:  Recent Labs   Lab 03/26/23  0445      K 3.4*   CL 93*   CO2 42*   BUN 16   CREATININE 1.84*   CALCIUM 7.1*   ALBUMIN 2.6*   PROT 5.8*   BILITOT 0.9   ALKPHOS 62   ALT 55   AST 76*       Recent Lab  Results         03/26/23  1059   03/26/23  1056        Influenza B, Molecular   Negative       COVID-19 Ag   Negative       Gram Stain Result Many WBC observed  [P]          Moderate Gram positive bacilli  [P]          Few Gram positive cocci  [P]         Influenza A   Negative                [P] - Preliminary Result               Significant Imaging:  I have reviewed all pertinent imaging results/findings within the past 24 hours.    Assessment/Plan:     Neuro  Tourette syndrome  Currently stable with sedation his tics are completely gone    Psychiatric  * Alcohol withdrawal syndrome with complication  Required intubation and sedation  Last drink was 3/19 so still right in the middle of possible DT's  Seems to be better      Depression  Prozac and Doxepin on home medication list but states he was not taking these..   Was also started on those medications along  with tranxene, trazodone and Zyprexa at [place] over the last 2 days  -- Patient is likely starting to have withdraws from alcohol and subaxone; however, the combination of prozac and doxepin and trazadone all increase the risk of serotonin syndrome .. given this concern serotonin syndrome should be in the differential as well     Will watch          Pulmonary  Acute respiratory failure with hypoxia and hypercarbia  Continue to work on weaning trials    Cardiac/Vascular  Elevated troponin level not due myocardial infarction  Seen by cardiology     HTN (hypertension)  Blood pressure is better with sedation    Renal/  Metabolic acidosis  Improved    Acute kidney injury  Creatinine improving urine output up       Orthopedic  Rhabdomyolysis  Improving  CK is  Down to 1747 today     Other  Fever  Temp curve slowly coming down                   Des Navarro MD  Pulmonology  [facility]

## 2023-03-27 NOTE — ASSESSMENT & PLAN NOTE
Required intubation and sedation  Last drink was 3/19 so still right in the middle of possible DT's  Seems to be better

## 2023-03-27 NOTE — PLAN OF CARE
Problem: Infection  Goal: Absence of Infection Signs and Symptoms  Outcome: Ongoing, Progressing     Problem: Adult Inpatient Plan of Care  Goal: Plan of Care Review  Outcome: Ongoing, Progressing  Goal: Patient-Specific Goal (Individualized)  Outcome: Ongoing, Progressing  Goal: Absence of Hospital-Acquired Illness or Injury  Outcome: Ongoing, Progressing  Goal: Optimal Comfort and Wellbeing  Outcome: Ongoing, Progressing  Goal: Readiness for Transition of Care  Outcome: Ongoing, Progressing     Problem: Fluid and Electrolyte Imbalance (Acute Kidney Injury/Impairment)  Goal: Fluid and Electrolyte Balance  Outcome: Ongoing, Progressing     Problem: Oral Intake Inadequate (Acute Kidney Injury/Impairment)  Goal: Optimal Nutrition Intake  Outcome: Ongoing, Progressing     Problem: Renal Function Impairment (Acute Kidney Injury/Impairment)  Goal: Effective Renal Function  Outcome: Ongoing, Progressing     Problem: Skin Injury Risk Increased  Goal: Skin Health and Integrity  Outcome: Ongoing, Progressing     Problem: Fall Injury Risk  Goal: Absence of Fall and Fall-Related Injury  Outcome: Ongoing, Progressing     Problem: Restraint, Nonbehavioral (Nonviolent)  Goal: Absence of Harm or Injury  Outcome: Ongoing, Progressing     Problem: Communication Impairment (Mechanical Ventilation, Invasive)  Goal: Effective Communication  Outcome: Ongoing, Progressing     Problem: Inability to Wean (Mechanical Ventilation, Invasive)  Goal: Mechanical Ventilation Liberation  Outcome: Ongoing, Progressing     Problem: Nutrition Impairment (Mechanical Ventilation, Invasive)  Goal: Optimal Nutrition Delivery  Outcome: Ongoing, Progressing     Problem: Skin and Tissue Injury (Mechanical Ventilation, Invasive)  Goal: Absence of Device-Related Skin and Tissue Injury  Outcome: Ongoing, Progressing     Problem: Ventilator-Induced Lung Injury (Mechanical Ventilation, Invasive)  Goal: Absence of Ventilator-Induced Lung Injury  Outcome:  Ongoing, Progressing     Problem: Communication Impairment (Artificial Airway)  Goal: Effective Communication  Outcome: Ongoing, Progressing     Problem: Device-Related Complication Risk (Artificial Airway)  Goal: Optimal Device Function  Outcome: Ongoing, Progressing     Problem: Skin and Tissue Injury (Artificial Airway)  Goal: Absence of Device-Related Skin or Tissue Injury  Outcome: Ongoing, Progressing     Problem: Noninvasive Ventilation Acute  Goal: Effective Unassisted Ventilation and Oxygenation  Outcome: Ongoing, Progressing     Problem: Impaired Wound Healing  Goal: Optimal Wound Healing  Outcome: Ongoing, Progressing     Problem: Breathing Pattern Ineffective  Goal: Effective Breathing Pattern  Outcome: Ongoing, Progressing     Problem: Gas Exchange Impaired  Goal: Optimal Gas Exchange  Outcome: Ongoing, Progressing

## 2023-03-27 NOTE — RESPIRATORY THERAPY
WEANING RATE 0 PS 12 PEEP 5 FIO2 40%   2 HOURS TID     1)0820 PATIENT PLACED ON CPAP WITH ABOVE SETTINGS  1020 PATIENT PLACED ON  AC RATE     PATIENT TOLERATED CPAP WELL    2)1435 PATIENT PLACED ON CPAP WITH ABOVE SETTINGS   1630 PATIENT PLACED ON AC RATE     PATIENT TOLERATED CPAP WELL

## 2023-03-27 NOTE — PROGRESS NOTES
[facility]  Adult Nutrition  Follow Up Note    SUMMARY       Recommendations    Recommendation/Intervention: Recommend continue current tube feeding regimen as able/appropriate and tolerated.  Goals: tube feeding tolerance/adequacy, weight maintenance  Nutrition Goal Status: progressing towards goal  Communication of RD Recs: discussed on rounds    Assessment and Plan  RD follow up. Current weight is 257 lbs. Pt remains intubated and sedated on vent with propofol infusing @ 27.5 mL/hr per flowsheets. Propofol is providing 726 kcals/day. Trials ongoing.     Pt continues on tube feedings of Jevity 1.5 (isosource may be used in place of Jevity as needed due to supply issues) + Prosource BID + free water flush @ 60 mL/hr providin kcals, 94 g PRO, ~2133 mL H2O. Propofol + tube feedings providin kcals (100% needs), 94 g PRO (100% needs). Continue as tolerated.     Last BM 3/24 per flowsheets. Labs/meds reviewed. RD following.     Nutrition Diagnosis  Inadequate energy intake   related to patient intubated and sedated as evidenced by requiring NG tube feeds for nutrition     Nutrition Diagnosis Status: Chronic/ continues    Reason for Assessment    Reason For Assessment: RD follow-up  Relevant Medical History: alcohol withdrawal, toureettes, EVELYN, depression, metabolic acidosis, AMS    Nutrition Risk Screen    Nutrition Risk Screen: no indicators present    Nutrition/Diet History    Food Allergies: NKFA  Factors Affecting Nutritional Intake: NPO, on mechanical ventilation    Anthropometrics    Temp: 100 °F (37.8 °C)  Height: 6' (182.9 cm)  Height (inches): 72 in  Weight Method: Bed Scale  Weight: 116.8 kg (257 lb 8 oz)  Weight (lb): 257.5 lb  Ideal Body Weight (IBW), Male: 178 lb  % Ideal Body Weight, Male (lb): 142.06 %  BMI (Calculated): 34.9       Lab/Procedures/Meds   Latest Reference Range & Units 23 04:43   Sodium 136 - 145 mmol/L 138   Potassium 3.5 - 5.1 mmol/L 3.2 (L)    Chloride 98 - 107 mmol/L 88 (L)   CO2 21 - 32 mmol/L 45 (H)   Anion Gap 7 - 16 mmol/L 8   BUN 7 - 18 mg/dL 24 (H)   Creatinine 0.70 - 1.30 mg/dL 3.19 (H)   BUN/CREAT RATIO 6 - 20  8   eGFR >=60 mL/min/1.73m² 24 (L)   Glucose 74 - 106 mg/dL 101   Calcium 8.5 - 10.1 mg/dL 7.6 (L)   Alkaline Phosphatase 45 - 115 U/L 68   PROTEIN TOTAL 6.4 - 8.2 g/dL 7.1   Albumin 3.5 - 5.0 g/dL 2.6 (L)   Albumin/Globulin Ratio  0.6   BILIRUBIN TOTAL >0.0 - 1.2 mg/dL 1.0   AST 15 - 37 U/L 51 (H)   ALT 16 - 61 U/L 48   (L): Data is abnormally low  (H): Data is abnormally high    Note: Low K - replete to WNL as needed. Elevated BUN, Cr and low GFR - monitor renal labs. Will adjust protein recommendations as needed. Elevated AST. Low albumin.     Pertinent Labs Reviewed: reviewed  Pertinent Labs Comments:   Pertinent Medications Reviewed: reviewed  Pertinent Medications Comments: aspirin, enoxaparin, levetiracetam, pantoprazole, zosyn, polyethylene glycol    Estimated/Assessed Needs    Weight Used For Calorie Calculations: 87.7 kg (193 lb 5.5 oz) (adjusted weight used)  Energy Calorie Requirements (kcal): 2959-4771  Energy Need Method: Kcal/kg  Protein Requirements:   Weight Used For Protein Calculations: 87.7 kg (193 lb 5.5 oz)        RDA Method (mL): 2192         Nutrition Prescription Ordered    Current Diet Order: NPO + tube feedings  Current Nutrition Support Formula Ordered: Jevity 1.5  Current Nutrition Support Rate Ordered: 42 (ml)  Current Nutrition Support Frequency Ordered: continuous    Evaluation of Received Nutrient/Fluid Intake    Enteral Calories (kcal): 1632  Enteral Protein (gm): 94  Enteral (Free Water) Fluid (mL): 693  Free Water Flush Fluid (mL): 1440  Other Calories (kcal): 726  Total Calories (kcal): 2358  % Kcal Needs: 100  Total Protein (gm): 94  % Protein Needs: 100  Tolerance: tolerating  % Intake of Estimated Energy Needs: 75 - 100 %  % Meal Intake: NPO    Nutrition Risk    Level of Risk/Frequency of  Follow-up: high     Monitor and Evaluation    Food and Nutrient Intake: enteral nutrition intake  Food and Nutrient Adminstration: enteral and parenteral nutrition administration  Anthropometric Measurements: weight change, weight  Biochemical Data, Medical Tests and Procedures: lipid profile, inflammatory profile, glucose/endocrine profile, gastrointestinal profile, electrolyte and renal panel  Nutrition-Focused Physical Findings: overall appearance     Nutrition Follow-Up    RD Follow-up?: Yes

## 2023-03-27 NOTE — ASSESSMENT & PLAN NOTE
Prozac and Doxepin on home medication list but states he was not taking these..   Was also started on those medications along  with tranxene, trazodone and Zyprexa at [place] over the last 2 days  -- Patient is likely starting to have withdraws from alcohol and subaxone; however, the combination of prozac and doxepin and trazadone all increase the risk of serotonin syndrome .. given this concern serotonin syndrome should be in the differential as well     Will watch

## 2023-03-27 NOTE — NURSING
Levophed titrated up per md order to maintain a map above 60mm/hg.Present bp is 93/41 with a map of 53.

## 2023-03-27 NOTE — NURSING
0820 placed on cpap trial per RT. Am meds given. Will monitor  0930 pt restless. Benadryl 50 mg ivp given for same.  1020: placed back on vent settings. Tolerated 2 hours of cpap. Pt restless trying to get out of bed.  Ativan given for same. Temp up to 102 so also gave motrin for this.   1430 temp down to 99 now. Pt placed on 2nd cpap trial. Will monitor. Pt conitnues to have periods of restlessness requiring ativan in addition to fentanyl and diprivan infusions.  1507 jpt trying to come out of the bed. Even restrainted pt attempting to hit at nurse. Ativan 2 mg ivp given for same.  1605 doing well on cpap trial. Pt remains slightly restless and agitated. Will monitor  1625 still very restless. Benadryl 50mg ivp given for agitation as ordered.  1632 switched back to vol ac.   1800 temp slowly increaseing. Pt grimacing. Motrin 800 mg given for this. Will monitor and report to oncoming shift.

## 2023-03-27 NOTE — PLAN OF CARE
Per unit meeting, pt remains on vent with ongoing trails, leslie continued, pt remains on Levo, bilateral wrist restraints in use, will continue with poc, ss following.

## 2023-03-28 LAB
ABO + RH BLD: NORMAL
ABORH RETYPE: NORMAL
ALBUMIN SERPL BCP-MCNC: 2.2 G/DL (ref 3.5–5)
ALBUMIN/GLOB SERPL: 0.5 {RATIO}
ALP SERPL-CCNC: 81 U/L (ref 45–115)
ALT SERPL W P-5'-P-CCNC: 42 U/L (ref 16–61)
ANION GAP SERPL CALCULATED.3IONS-SCNC: 9 MMOL/L (ref 7–16)
ANISOCYTOSIS BLD QL SMEAR: ABNORMAL
AST SERPL W P-5'-P-CCNC: 68 U/L (ref 15–37)
ATYPICAL LYMPHOCYTES: ABNORMAL
BASOPHILS # BLD AUTO: 0.04 K/UL (ref 0–0.2)
BASOPHILS NFR BLD AUTO: 0.7 % (ref 0–1)
BILIRUB SERPL-MCNC: 1.2 MG/DL (ref ?–1.2)
BLD PROD TYP BPU: NORMAL
BLOOD UNIT EXPIRATION DATE: NORMAL
BLOOD UNIT TYPE CODE: 5100
BUN SERPL-MCNC: 25 MG/DL (ref 7–18)
BUN/CREAT SERPL: 12 (ref 6–20)
CALCIUM SERPL-MCNC: 7.6 MG/DL (ref 8.5–10.1)
CHLORIDE SERPL-SCNC: 93 MMOL/L (ref 98–107)
CK SERPL-CCNC: 1630 U/L (ref 39–308)
CO2 SERPL-SCNC: 40 MMOL/L (ref 21–32)
CREAT SERPL-MCNC: 2.17 MG/DL (ref 0.7–1.3)
CROSSMATCH INTERPRETATION: NORMAL
CULTURE, LOWER RESPIRATORY: NORMAL
DIFFERENTIAL METHOD BLD: ABNORMAL
DISPENSE STATUS: NORMAL
EGFR (NO RACE VARIABLE) (RUSH/TITUS): 37 ML/MIN/1.73M²
EOSINOPHIL # BLD AUTO: 0.19 K/UL (ref 0–0.5)
EOSINOPHIL NFR BLD AUTO: 3.1 % (ref 1–4)
EOSINOPHIL NFR BLD MANUAL: 6 % (ref 1–4)
ERYTHROCYTE [DISTWIDTH] IN BLOOD BY AUTOMATED COUNT: 15.5 % (ref 11.5–14.5)
GLOBULIN SER-MCNC: 4.2 G/DL (ref 2–4)
GLUCOSE SERPL-MCNC: 84 MG/DL (ref 74–106)
GRAM STN SPEC: NORMAL
GRAM STN SPEC: NORMAL
HCO3 UR-SCNC: 51.4 MMOL/L (ref 21–28)
HCT VFR BLD AUTO: 21.7 % (ref 40–54)
HGB BLD-MCNC: 6.9 G/DL (ref 13.5–18)
HYPOCHROMIA BLD QL SMEAR: ABNORMAL
IMM GRANULOCYTES # BLD AUTO: 0.08 K/UL (ref 0–0.04)
IMM GRANULOCYTES NFR BLD: 1.3 % (ref 0–0.4)
INDIRECT COOMBS: NORMAL
LYMPHOCYTES # BLD AUTO: 0.99 K/UL (ref 1–4.8)
LYMPHOCYTES NFR BLD AUTO: 16.3 % (ref 27–41)
LYMPHOCYTES NFR BLD MANUAL: 18 % (ref 27–41)
MACROCYTES BLD QL SMEAR: ABNORMAL
MCH RBC QN AUTO: 35.6 PG (ref 27–31)
MCHC RBC AUTO-ENTMCNC: 31.8 G/DL (ref 32–36)
MCV RBC AUTO: 111.9 FL (ref 80–96)
METAMYELOCYTES NFR BLD MANUAL: 1 %
MONOCYTES # BLD AUTO: 1.03 K/UL (ref 0–0.8)
MONOCYTES NFR BLD AUTO: 16.9 % (ref 2–6)
MONOCYTES NFR BLD MANUAL: 12 % (ref 2–6)
MPC BLD CALC-MCNC: 9.7 FL (ref 9.4–12.4)
MYELOCYTES NFR BLD MANUAL: 1 %
NEUTROPHILS # BLD AUTO: 3.75 K/UL (ref 1.8–7.7)
NEUTROPHILS NFR BLD AUTO: 61.7 % (ref 53–65)
NEUTS BAND NFR BLD MANUAL: 12 % (ref 1–5)
NEUTS SEG NFR BLD MANUAL: 50 % (ref 50–62)
NRBC # BLD AUTO: 0 X10E3/UL
NRBC, AUTO (.00): 0 %
PCO2 BLDA: 69 MMHG (ref 35–48)
PH SMN: 7.48 [PH] (ref 7.35–7.45)
PLATELET # BLD AUTO: 263 K/UL (ref 150–400)
PLATELET MORPHOLOGY: ABNORMAL
PO2 BLDA: 82 MMHG (ref 83–108)
POC BASE EXCESS: 23.7 MMOL/L (ref -2–3)
POC SATURATED O2: 97 % (ref 95–98)
POLYCHROMASIA BLD QL SMEAR: ABNORMAL
POTASSIUM SERPL-SCNC: 4 MMOL/L (ref 3.5–5.1)
PROT SERPL-MCNC: 6.4 G/DL (ref 6.4–8.2)
RBC # BLD AUTO: 1.94 M/UL (ref 4.6–6.2)
RH BLD: NORMAL
SODIUM SERPL-SCNC: 138 MMOL/L (ref 136–145)
SPECIMEN OUTDATE: NORMAL
STOMATOCYTES BLD QL SMEAR: ABNORMAL
TRIGL SERPL-MCNC: 282 MG/DL (ref 35–150)
UNIT NUMBER: NORMAL
WBC # BLD AUTO: 6.08 K/UL (ref 4.5–11)

## 2023-03-28 PROCEDURE — 63600175 PHARM REV CODE 636 W HCPCS: Performed by: INTERNAL MEDICINE

## 2023-03-28 PROCEDURE — 99233 PR SUBSEQUENT HOSPITAL CARE,LEVL III: ICD-10-PCS | Mod: ,,, | Performed by: INTERNAL MEDICINE

## 2023-03-28 PROCEDURE — 82803 BLOOD GASES ANY COMBINATION: CPT

## 2023-03-28 PROCEDURE — 25000003 PHARM REV CODE 250: Performed by: INTERNAL MEDICINE

## 2023-03-28 PROCEDURE — P9016 RBC LEUKOCYTES REDUCED: HCPCS | Performed by: INTERNAL MEDICINE

## 2023-03-28 PROCEDURE — 86923 COMPATIBILITY TEST ELECTRIC: CPT | Performed by: INTERNAL MEDICINE

## 2023-03-28 PROCEDURE — 20000000 HC ICU ROOM

## 2023-03-28 PROCEDURE — 63600175 PHARM REV CODE 636 W HCPCS: Performed by: HOSPITALIST

## 2023-03-28 PROCEDURE — 36600 WITHDRAWAL OF ARTERIAL BLOOD: CPT

## 2023-03-28 PROCEDURE — 99900026 HC AIRWAY MAINTENANCE (STAT)

## 2023-03-28 PROCEDURE — 99233 SBSQ HOSP IP/OBS HIGH 50: CPT | Mod: ,,, | Performed by: INTERNAL MEDICINE

## 2023-03-28 PROCEDURE — 94761 N-INVAS EAR/PLS OXIMETRY MLT: CPT

## 2023-03-28 PROCEDURE — 27000221 HC OXYGEN, UP TO 24 HOURS

## 2023-03-28 PROCEDURE — 99900035 HC TECH TIME PER 15 MIN (STAT)

## 2023-03-28 PROCEDURE — 63600175 PHARM REV CODE 636 W HCPCS: Performed by: NURSE PRACTITIONER

## 2023-03-28 PROCEDURE — C9113 INJ PANTOPRAZOLE SODIUM, VIA: HCPCS | Performed by: NURSE PRACTITIONER

## 2023-03-28 PROCEDURE — 80053 COMPREHEN METABOLIC PANEL: CPT | Performed by: NURSE PRACTITIONER

## 2023-03-28 PROCEDURE — 25000003 PHARM REV CODE 250: Performed by: NURSE PRACTITIONER

## 2023-03-28 PROCEDURE — 86900 BLOOD TYPING SEROLOGIC ABO: CPT | Performed by: INTERNAL MEDICINE

## 2023-03-28 PROCEDURE — 85025 COMPLETE CBC W/AUTO DIFF WBC: CPT | Performed by: NURSE PRACTITIONER

## 2023-03-28 PROCEDURE — 25000003 PHARM REV CODE 250: Performed by: HOSPITALIST

## 2023-03-28 PROCEDURE — 82550 ASSAY OF CK (CPK): CPT | Performed by: INTERNAL MEDICINE

## 2023-03-28 PROCEDURE — 84478 ASSAY OF TRIGLYCERIDES: CPT | Performed by: INTERNAL MEDICINE

## 2023-03-28 PROCEDURE — 94003 VENT MGMT INPAT SUBQ DAY: CPT

## 2023-03-28 PROCEDURE — 36430 TRANSFUSION BLD/BLD COMPNT: CPT

## 2023-03-28 RX ORDER — HYDROCODONE BITARTRATE AND ACETAMINOPHEN 500; 5 MG/1; MG/1
TABLET ORAL
Status: DISCONTINUED | OUTPATIENT
Start: 2023-03-28 | End: 2023-04-04

## 2023-03-28 RX ORDER — FUROSEMIDE 10 MG/ML
40 INJECTION INTRAMUSCULAR; INTRAVENOUS ONCE
Status: COMPLETED | OUTPATIENT
Start: 2023-03-28 | End: 2023-03-28

## 2023-03-28 RX ORDER — ZIPRASIDONE MESYLATE 20 MG/ML
20 INJECTION, POWDER, LYOPHILIZED, FOR SOLUTION INTRAMUSCULAR ONCE
Status: COMPLETED | OUTPATIENT
Start: 2023-03-28 | End: 2023-03-28

## 2023-03-28 RX ADMIN — PANTOPRAZOLE SODIUM 40 MG: 40 INJECTION, POWDER, FOR SOLUTION INTRAVENOUS at 09:03

## 2023-03-28 RX ADMIN — LORAZEPAM 2 MG: 2 INJECTION INTRAMUSCULAR; INTRAVENOUS at 06:03

## 2023-03-28 RX ADMIN — ZIPRASIDONE MESYLATE 20 MG: 20 INJECTION, POWDER, LYOPHILIZED, FOR SOLUTION INTRAMUSCULAR at 07:03

## 2023-03-28 RX ADMIN — FUROSEMIDE 40 MG: 10 INJECTION, SOLUTION INTRAMUSCULAR; INTRAVENOUS at 09:03

## 2023-03-28 RX ADMIN — PROPOFOL 40 MCG/KG/MIN: 10 INJECTION, EMULSION INTRAVENOUS at 12:03

## 2023-03-28 RX ADMIN — LORAZEPAM 1 MG: 2 INJECTION INTRAMUSCULAR; INTRAVENOUS at 03:03

## 2023-03-28 RX ADMIN — PIPERACILLIN AND TAZOBACTAM 4.5 G: 4; .5 INJECTION, POWDER, FOR SOLUTION INTRAVENOUS; PARENTERAL at 09:03

## 2023-03-28 RX ADMIN — SODIUM CHLORIDE: 9 INJECTION, SOLUTION INTRAVENOUS at 09:03

## 2023-03-28 RX ADMIN — LEVETIRACETAM 500 MG: 500 TABLET, FILM COATED ORAL at 09:03

## 2023-03-28 RX ADMIN — PROPOFOL 45 MCG/KG/MIN: 10 INJECTION, EMULSION INTRAVENOUS at 05:03

## 2023-03-28 RX ADMIN — ASPIRIN 81 MG: 81 TABLET, DELAYED RELEASE ORAL at 09:03

## 2023-03-28 RX ADMIN — PIPERACILLIN AND TAZOBACTAM 4.5 G: 4; .5 INJECTION, POWDER, FOR SOLUTION INTRAVENOUS; PARENTERAL at 05:03

## 2023-03-28 RX ADMIN — LORAZEPAM 2 MG: 2 INJECTION INTRAMUSCULAR; INTRAVENOUS at 09:03

## 2023-03-28 RX ADMIN — LORAZEPAM 2 MG: 2 INJECTION INTRAMUSCULAR; INTRAVENOUS at 04:03

## 2023-03-28 RX ADMIN — PROPOFOL 50 MCG/KG/MIN: 10 INJECTION, EMULSION INTRAVENOUS at 09:03

## 2023-03-28 RX ADMIN — LORAZEPAM 2 MG: 2 INJECTION INTRAMUSCULAR; INTRAVENOUS at 10:03

## 2023-03-28 RX ADMIN — POLYETHYLENE GLYCOL 3350 17 G: 17 POWDER, FOR SOLUTION ORAL at 09:03

## 2023-03-28 RX ADMIN — LORAZEPAM 2 MG: 2 INJECTION INTRAMUSCULAR; INTRAVENOUS at 02:03

## 2023-03-28 RX ADMIN — IBUPROFEN 800 MG: 400 TABLET, FILM COATED ORAL at 09:03

## 2023-03-28 RX ADMIN — DIPHENHYDRAMINE HYDROCHLORIDE 50 MG: 50 INJECTION, SOLUTION INTRAMUSCULAR; INTRAVENOUS at 12:03

## 2023-03-28 RX ADMIN — LORAZEPAM 2 MG: 2 INJECTION INTRAMUSCULAR; INTRAVENOUS at 11:03

## 2023-03-28 RX ADMIN — FENTANYL CITRATE 250 MCG/HR: 50 INJECTION, SOLUTION INTRAMUSCULAR; INTRAVENOUS at 03:03

## 2023-03-28 RX ADMIN — ENOXAPARIN SODIUM 40 MG: 100 INJECTION SUBCUTANEOUS at 04:03

## 2023-03-28 RX ADMIN — SODIUM CHLORIDE: 9 INJECTION, SOLUTION INTRAVENOUS at 05:03

## 2023-03-28 RX ADMIN — DIPHENHYDRAMINE HYDROCHLORIDE 50 MG: 50 INJECTION, SOLUTION INTRAMUSCULAR; INTRAVENOUS at 06:03

## 2023-03-28 RX ADMIN — PIPERACILLIN AND TAZOBACTAM 4.5 G: 4; .5 INJECTION, POWDER, FOR SOLUTION INTRAVENOUS; PARENTERAL at 03:03

## 2023-03-28 RX ADMIN — PROPOFOL 45 MCG/KG/MIN: 10 INJECTION, EMULSION INTRAVENOUS at 03:03

## 2023-03-28 RX ADMIN — LORAZEPAM 2 MG: 2 INJECTION INTRAMUSCULAR; INTRAVENOUS at 08:03

## 2023-03-28 RX ADMIN — DIPHENHYDRAMINE HYDROCHLORIDE 50 MG: 50 INJECTION, SOLUTION INTRAMUSCULAR; INTRAVENOUS at 11:03

## 2023-03-28 NOTE — SUBJECTIVE & OBJECTIVE
Interval History:  Patient without complaints      Objective:     Vital Signs (Most Recent):  Temp: 98.4 °F (36.9 °C) (03/28/23 0600)  Pulse: 83 (03/28/23 0600)  Resp: 12 (03/28/23 0600)  BP: 132/60 (03/28/23 0600)  SpO2: 99 % (03/28/23 0600) Vital Signs (24h Range):  Temp:  [97.5 °F (36.4 °C)-102.6 °F (39.2 °C)] 98.4 °F (36.9 °C)  Pulse:  [] 83  Resp:  [7-18] 12  SpO2:  [71 %-100 %] 99 %  BP: ()/(35-78) 132/60     Weight: 125.1 kg (275 lb 12.7 oz)  Body mass index is 37.4 kg/m².      Intake/Output Summary (Last 24 hours) at 3/28/2023 0613  Last data filed at 3/28/2023 0600  Gross per 24 hour   Intake 7529.34 ml   Output 3760 ml   Net 3769.34 ml       Physical Exam  Vitals reviewed.   Constitutional:       Appearance: Normal appearance.      Interventions: He is not intubated.  HENT:      Head: Normocephalic and atraumatic.      Nose: Nose normal.      Mouth/Throat:      Mouth: Mucous membranes are dry.      Pharynx: Oropharynx is clear.   Eyes:      Extraocular Movements: Extraocular movements intact.      Conjunctiva/sclera: Conjunctivae normal.      Pupils: Pupils are equal, round, and reactive to light.   Cardiovascular:      Rate and Rhythm: Normal rate.      Heart sounds: Normal heart sounds. No murmur heard.  Pulmonary:      Effort: Pulmonary effort is normal. He is not intubated.      Breath sounds: Normal breath sounds.   Abdominal:      General: Abdomen is flat. Bowel sounds are normal.      Palpations: Abdomen is soft.   Musculoskeletal:         General: Normal range of motion.      Cervical back: Normal range of motion and neck supple.      Right lower leg: No edema.      Left lower leg: No edema.   Skin:     General: Skin is warm and dry.      Capillary Refill: Capillary refill takes less than 2 seconds.   Neurological:      General: No focal deficit present.      Mental Status: He is alert and oriented to person, place, and time.   Psychiatric:         Mood and Affect: Mood normal.          Behavior: Behavior normal.     Review of Systems    Vents:  Vent Mode: A/C (03/28/23 0428)  Ventilator Initiated: Yes (03/23/23 0000)  Set Rate: 12 BPM (03/28/23 0428)  Vt Set: 500 mL (03/28/23 0428)  Pressure Support: 12 cmH20 (03/27/23 0822)  PEEP/CPAP: 5 cmH20 (03/28/23 0428)  Oxygen Concentration (%): 40 (03/28/23 0428)  Peak Airway Pressure: 28 cmH20 (03/28/23 0428)  Total Ve: 6.2 L/m (03/28/23 0428)  F/VT Ratio<105 (RSBI): (!) 27.66 (03/28/23 0015)    Lines/Drains/Airways       Drain  Duration                  NG/OG Tube 03/23/23 0014 [place] sump Right nostril 5 days         Urethral Catheter 03/23/23 1000 Non-latex 16 Fr. 4 days              Airway  Duration                  Airway - Non-Surgical 03/22/23 2358 Endotracheal Tube 5 days              Peripheral Intravenous Line  Duration                  Peripheral IV - Single Lumen 03/25/23 1929 20 G Anterior;Left;Proximal Forearm 2 days         Peripheral IV - Single Lumen 03/27/23 1730 20 G Anterior;Distal;Left Forearm <1 day         Peripheral IV - Single Lumen 03/27/23 2141 20 G Right Antecubital <1 day         Peripheral IV - Single Lumen 03/27/23 2147 20 G Posterior;Right Hand <1 day                    Significant Labs:    CBC/Anemia Profile:  Recent Labs   Lab 03/27/23  0443   WBC 9.22   HGB 7.3*   HCT 22.6*      .2*   RDW 15.3*        Chemistries:  Recent Labs   Lab 03/27/23  0443 03/27/23  1432    138   K 3.2* 3.5   CL 88* 90*   CO2 45* 45*   BUN 24* 27*   CREATININE 3.19* 2.83*   CALCIUM 7.6* 7.2*   ALBUMIN 2.6*  --    PROT 7.1  --    BILITOT 1.0  --    ALKPHOS 68  --    ALT 48  --    AST 51*  --        Recent Lab Results         03/27/23  1432        Anion Gap 7       BUN 27       BUN/CREAT RATIO 10       Calcium 7.2       Chloride 90       CO2 45       Creatinine 2.83       eGFR 27       Glucose 107       Potassium 3.5       Sodium 138               Significant Imaging:  I have reviewed all pertinent imaging results/findings  within the past 24 hours.

## 2023-03-28 NOTE — PLAN OF CARE
Problem: Infection  Goal: Absence of Infection Signs and Symptoms  Outcome: Ongoing, Progressing     Problem: Adult Inpatient Plan of Care  Goal: Plan of Care Review  Outcome: Ongoing, Progressing  Goal: Patient-Specific Goal (Individualized)  Outcome: Ongoing, Progressing  Goal: Absence of Hospital-Acquired Illness or Injury  Outcome: Ongoing, Progressing  Goal: Optimal Comfort and Wellbeing  Outcome: Ongoing, Progressing  Goal: Readiness for Transition of Care  Outcome: Ongoing, Progressing     Problem: Fluid and Electrolyte Imbalance (Acute Kidney Injury/Impairment)  Goal: Fluid and Electrolyte Balance  Outcome: Ongoing, Progressing     Problem: Oral Intake Inadequate (Acute Kidney Injury/Impairment)  Goal: Optimal Nutrition Intake  Outcome: Ongoing, Progressing     Problem: Renal Function Impairment (Acute Kidney Injury/Impairment)  Goal: Effective Renal Function  Outcome: Ongoing, Progressing     Problem: Skin Injury Risk Increased  Goal: Skin Health and Integrity  Outcome: Ongoing, Progressing     Problem: Fall Injury Risk  Goal: Absence of Fall and Fall-Related Injury  Outcome: Ongoing, Progressing     Problem: Restraint, Nonbehavioral (Nonviolent)  Goal: Absence of Harm or Injury  Outcome: Ongoing, Progressing     Problem: Communication Impairment (Mechanical Ventilation, Invasive)  Goal: Effective Communication  Outcome: Ongoing, Progressing     Problem: Device-Related Complication Risk (Mechanical Ventilation, Invasive)  Goal: Optimal Device Function  Outcome: Ongoing, Progressing     Problem: Inability to Wean (Mechanical Ventilation, Invasive)  Goal: Mechanical Ventilation Liberation  Outcome: Ongoing, Progressing     Problem: Nutrition Impairment (Mechanical Ventilation, Invasive)  Goal: Optimal Nutrition Delivery  Outcome: Ongoing, Progressing     Problem: Skin and Tissue Injury (Mechanical Ventilation, Invasive)  Goal: Absence of Device-Related Skin and Tissue Injury  Outcome: Ongoing, Progressing      Problem: Ventilator-Induced Lung Injury (Mechanical Ventilation, Invasive)  Goal: Absence of Ventilator-Induced Lung Injury  Outcome: Ongoing, Progressing     Problem: Communication Impairment (Artificial Airway)  Goal: Effective Communication  Outcome: Ongoing, Progressing     Problem: Device-Related Complication Risk (Artificial Airway)  Goal: Optimal Device Function  Outcome: Ongoing, Progressing     Problem: Noninvasive Ventilation Acute  Goal: Effective Unassisted Ventilation and Oxygenation  Outcome: Ongoing, Progressing     Problem: Impaired Wound Healing  Goal: Optimal Wound Healing  Outcome: Ongoing, Progressing     Problem: Breathing Pattern Ineffective  Goal: Effective Breathing Pattern  Outcome: Ongoing, Progressing     Problem: Gas Exchange Impaired  Goal: Optimal Gas Exchange  Outcome: Ongoing, Progressing

## 2023-03-28 NOTE — ASSESSMENT & PLAN NOTE
Urine output was much better yesterday creatinine was up will check his creatinine today.  His weight is up he may need more diuresis will try a spontaneous breathing trial this morning

## 2023-03-28 NOTE — RESPIRATORY THERAPY
WEANING TRIALS    0722 PATIENT PLACED ON CPAP BY NURSE WITH SETTINGS OF PS 8 PEEP 5 RATE 0 50% FIO2    ABGS WERE DRAWN AN HOUR AFTER BEING ON CPAP    1100 PATIENT PLACED ON CPAP AGAIN BY NURSE WITH ABOVE SETTINGS    1220 PATIENT SELF EXTUBATED HIMSELF AND WAS PLACED % NON REBREATHER MASK WITH O2 SAT %

## 2023-03-28 NOTE — NURSING
0722 placed pt on cpap trial. Pt rr only 4-6 per min but pulling good volumes and sats remaining 100%. At bedside to monitor during trial.  0825: sats remain good during trial. Rr still low but pulling great Tv. Abg's drawn per RT.  0850 abg results sent to dr dave. Pt had episode of severe agitation, pulling restraints trying to sit up and get to ETT. Attempted to reorient pt with no success. Ativan 2 mg ivp given for same. Copious amounts of oral and tracheal secretions obtained also. Took 2 RN's to hold pt in bed even with shima wrist restraints in place.  0920 dr dave said to sedate him and continue weaning trials today. Discussed with Flora and will try 4 hours tid.   0950 pt grimacing, tensing up a lot and pain rated 1-2. Temp also increasing to 100.6 ibuprofen 800 mg given as ordered. Pt still frequently tries to pull up and throw legs off bed. Numerous attempts to reorient him.  1000 pt sats staying in the low 90's. Increased fio2 to 50% and will monitor  1100 placed pt back on cpap for trial.  1135 pt restless, pulling at restraints. Ativan 2 mg ivp given for same.  1155 pulled pt up in bed.  1200 pt self extubated. Mouth suctioned, placed on 100% NRB. Sats good. Pt able to state his name and that he wants to go home. Reoriented pt to situation. Remains in shima soft wrist restraints, will re eval when more awake. BESSY DelgadilloP notified of extubation.  1255 attempted to call pt's mom. No answer. Left message for her to call me back when she can. Tried to switch pt to NC but sats dropped and so switched back to NRB at 80%. Will monitor  1300 pt remains confused, keeps pulling mask off and sats drop. Temp back down. Continues with leslie.  1505 pt c/o feeling anxious. Spoke with flora. Give ativan 1 mg ivp now. Pt still needs frequent redirection to keep o2 mask on/   1725 pt cleaned up from small bowel movement. Dark brown soft. Pt remains confused. Diuresing.  1805 pt continues to take off oxygen  frequently. Sats drop to the 80's. Will recover once o2 replaced.  Pt remains restrained to prevent harm to self' will monitor and report to oncoming shift.  1810: pt states that he's nervous. Wants to be knocked out. Informed him that I can't knock him out cause then he won't breathe and will be back on the vent. Pt states doesn't want to be back on the vent. Reassured pt that his vitals are good but he needs to leave his oxygen on.   1824: pt severely agitated. Benadryl 50 mg ivp given for same.  1855: pt half out of bed. Pulled up. Restraints tightened. Report given to Brandin. Pt remains oriented but very agitated.

## 2023-03-28 NOTE — PROGRESS NOTES
[facility]  Pulmonology  Progress Note    Patient Name: Carol Ladd Jr.  MRN: [id number]  Admission Date: 3/21/2023  Hospital Length of Stay: 7 days  Code Status: No Order  Attending Provider: Des Navarro MD  Primary Care Provider: Raymundo Cabrera MD   Principal Problem: Alcohol withdrawal syndrome with complication    Subjective:     Interval History:  Patient without complaints      Objective:     Vital Signs (Most Recent):  Temp: 98.4 °F (36.9 °C) (03/28/23 0600)  Pulse: 83 (03/28/23 0600)  Resp: 12 (03/28/23 0600)  BP: 132/60 (03/28/23 0600)  SpO2: 99 % (03/28/23 0600) Vital Signs (24h Range):  Temp:  [97.5 °F (36.4 °C)-102.6 °F (39.2 °C)] 98.4 °F (36.9 °C)  Pulse:  [] 83  Resp:  [7-18] 12  SpO2:  [71 %-100 %] 99 %  BP: ()/(35-78) 132/60     Weight: 125.1 kg (275 lb 12.7 oz)  Body mass index is 37.4 kg/m².      Intake/Output Summary (Last 24 hours) at 3/28/2023 0613  Last data filed at 3/28/2023 0600  Gross per 24 hour   Intake 7529.34 ml   Output 3760 ml   Net 3769.34 ml       Physical Exam  Vitals reviewed.   Constitutional:       Appearance: Normal appearance.      Interventions: He is not intubated.  HENT:      Head: Normocephalic and atraumatic.      Nose: Nose normal.      Mouth/Throat:      Mouth: Mucous membranes are dry.      Pharynx: Oropharynx is clear.   Eyes:      Extraocular Movements: Extraocular movements intact.      Conjunctiva/sclera: Conjunctivae normal.      Pupils: Pupils are equal, round, and reactive to light.   Cardiovascular:      Rate and Rhythm: Normal rate.      Heart sounds: Normal heart sounds. No murmur heard.  Pulmonary:      Effort: Pulmonary effort is normal. He is not intubated.      Breath sounds: Normal breath sounds.   Abdominal:      General: Abdomen is flat. Bowel sounds are normal.      Palpations: Abdomen is soft.   Musculoskeletal:         General: Normal range of motion.      Cervical back: Normal range of motion and  neck supple.      Right lower leg: No edema.      Left lower leg: No edema.   Skin:     General: Skin is warm and dry.      Capillary Refill: Capillary refill takes less than 2 seconds.   Neurological:      General: No focal deficit present.      Mental Status: He is alert and oriented to person, place, and time.   Psychiatric:         Mood and Affect: Mood normal.         Behavior: Behavior normal.     Review of Systems    Vents:  Vent Mode: A/C (03/28/23 0428)  Ventilator Initiated: Yes (03/23/23 0000)  Set Rate: 12 BPM (03/28/23 0428)  Vt Set: 500 mL (03/28/23 0428)  Pressure Support: 12 cmH20 (03/27/23 0822)  PEEP/CPAP: 5 cmH20 (03/28/23 0428)  Oxygen Concentration (%): 40 (03/28/23 0428)  Peak Airway Pressure: 28 cmH20 (03/28/23 0428)  Total Ve: 6.2 L/m (03/28/23 0428)  F/VT Ratio<105 (RSBI): (!) 27.66 (03/28/23 0015)    Lines/Drains/Airways       Drain  Duration                  NG/OG Tube 03/23/23 0014 [place] sump Right nostril 5 days         Urethral Catheter 03/23/23 1000 Non-latex 16 Fr. 4 days              Airway  Duration                  Airway - Non-Surgical 03/22/23 2358 Endotracheal Tube 5 days              Peripheral Intravenous Line  Duration                  Peripheral IV - Single Lumen 03/25/23 1929 20 G Anterior;Left;Proximal Forearm 2 days         Peripheral IV - Single Lumen 03/27/23 1730 20 G Anterior;Distal;Left Forearm <1 day         Peripheral IV - Single Lumen 03/27/23 2141 20 G Right Antecubital <1 day         Peripheral IV - Single Lumen 03/27/23 2147 20 G Posterior;Right Hand <1 day                    Significant Labs:    CBC/Anemia Profile:  Recent Labs   Lab 03/27/23 0443   WBC 9.22   HGB 7.3*   HCT 22.6*      .2*   RDW 15.3*        Chemistries:  Recent Labs   Lab 03/27/23 0443 03/27/23  1432    138   K 3.2* 3.5   CL 88* 90*   CO2 45* 45*   BUN 24* 27*   CREATININE 3.19* 2.83*   CALCIUM 7.6* 7.2*   ALBUMIN 2.6*  --    PROT 7.1  --    BILITOT 1.0  --    ALKPHOS 68   --    ALT 48  --    AST 51*  --        Recent Lab Results         03/27/23  1432        Anion Gap 7       BUN 27       BUN/CREAT RATIO 10       Calcium 7.2       Chloride 90       CO2 45       Creatinine 2.83       eGFR 27       Glucose 107       Potassium 3.5       Sodium 138               Significant Imaging:  I have reviewed all pertinent imaging results/findings within the past 24 hours.    Assessment/Plan:     Neuro  AMS (altered mental status)  Currently sedated on vent       Tourette syndrome  Currently stable with sedation his tics are completely gone    Psychiatric  * Alcohol withdrawal syndrome with complication  I think resolved this point      Pulmonary  Acute respiratory failure with hypoxia and hypercarbia  Try spontaneous breathing trial this morning    Cardiac/Vascular  HTN (hypertension)  Blood pressure is better with sedation    Renal/  Metabolic acidosis  Resolved    Acute kidney injury  Urine output was much better yesterday creatinine was up will check his creatinine today.  His weight is up he may need more diuresis will try a spontaneous breathing trial this morning      Orthopedic  Rhabdomyolysis  CPK pending today    Other  Fever  Afebrile for 24 hours                   Des Navarro MD  Pulmonology  [facility]

## 2023-03-28 NOTE — NURSING
BP is 131/59. Levophed weaned per md order to maintain a map above 60mm/hg. Levophed is now off with this weaning.

## 2023-03-28 NOTE — NURSING
Levophed restarted per md order to maintain a map above 60 mm/hg. Levo started at 0.02 mcg/kg/min. Current bp is 89/39 with a map of 55.

## 2023-03-28 NOTE — NURSING
Diprivan and fentanyl titrated up per md order to maintain a rass of -2. Pt. Is restless at this time.

## 2023-03-29 LAB
ALBUMIN SERPL BCP-MCNC: 2.6 G/DL (ref 3.5–5)
ALBUMIN/GLOB SERPL: 0.7 {RATIO}
ALP SERPL-CCNC: 107 U/L (ref 45–115)
ALT SERPL W P-5'-P-CCNC: 47 U/L (ref 16–61)
ANION GAP SERPL CALCULATED.3IONS-SCNC: 18 MMOL/L (ref 7–16)
ANISOCYTOSIS BLD QL SMEAR: ABNORMAL
AST SERPL W P-5'-P-CCNC: 93 U/L (ref 15–37)
BASOPHILS # BLD AUTO: 0.06 K/UL (ref 0–0.2)
BASOPHILS NFR BLD AUTO: 0.6 % (ref 0–1)
BILIRUB SERPL-MCNC: 2.2 MG/DL (ref ?–1.2)
BUN SERPL-MCNC: 24 MG/DL (ref 7–18)
BUN/CREAT SERPL: 14 (ref 6–20)
CALCIUM SERPL-MCNC: 7.7 MG/DL (ref 8.5–10.1)
CHLORIDE SERPL-SCNC: 98 MMOL/L (ref 98–107)
CK SERPL-CCNC: 2171 U/L (ref 39–308)
CO2 SERPL-SCNC: 35 MMOL/L (ref 21–32)
CREAT SERPL-MCNC: 1.7 MG/DL (ref 0.7–1.3)
DIFFERENTIAL METHOD BLD: ABNORMAL
EGFR (NO RACE VARIABLE) (RUSH/TITUS): 50 ML/MIN/1.73M²
EOSINOPHIL # BLD AUTO: 0.07 K/UL (ref 0–0.5)
EOSINOPHIL NFR BLD AUTO: 0.7 % (ref 1–4)
ERYTHROCYTE [DISTWIDTH] IN BLOOD BY AUTOMATED COUNT: 16.5 % (ref 11.5–14.5)
GLOBULIN SER-MCNC: 3.6 G/DL (ref 2–4)
GLUCOSE SERPL-MCNC: 106 MG/DL (ref 74–106)
HCT VFR BLD AUTO: 25.6 % (ref 40–54)
HGB BLD-MCNC: 8.2 G/DL (ref 13.5–18)
IMM GRANULOCYTES # BLD AUTO: 0.15 K/UL (ref 0–0.04)
IMM GRANULOCYTES NFR BLD: 1.6 % (ref 0–0.4)
LYMPHOCYTES # BLD AUTO: 0.78 K/UL (ref 1–4.8)
LYMPHOCYTES NFR BLD AUTO: 8.3 % (ref 27–41)
MACROCYTES BLD QL SMEAR: ABNORMAL
MCH RBC QN AUTO: 33.9 PG (ref 27–31)
MCHC RBC AUTO-ENTMCNC: 32 G/DL (ref 32–36)
MCV RBC AUTO: 105.8 FL (ref 80–96)
MONOCYTES # BLD AUTO: 1.1 K/UL (ref 0–0.8)
MONOCYTES NFR BLD AUTO: 11.8 % (ref 2–6)
MPC BLD CALC-MCNC: 9.8 FL (ref 9.4–12.4)
NEUTROPHILS # BLD AUTO: 7.19 K/UL (ref 1.8–7.7)
NEUTROPHILS NFR BLD AUTO: 77 % (ref 53–65)
NRBC # BLD AUTO: 0 X10E3/UL
NRBC, AUTO (.00): 0 %
PLATELET # BLD AUTO: 406 K/UL (ref 150–400)
PLATELET MORPHOLOGY: ABNORMAL
POLYCHROMASIA BLD QL SMEAR: ABNORMAL
POTASSIUM SERPL-SCNC: 3.5 MMOL/L (ref 3.5–5.1)
PROT SERPL-MCNC: 6.2 G/DL (ref 6.4–8.2)
RBC # BLD AUTO: 2.42 M/UL (ref 4.6–6.2)
SODIUM SERPL-SCNC: 147 MMOL/L (ref 136–145)
WBC # BLD AUTO: 9.35 K/UL (ref 4.5–11)

## 2023-03-29 PROCEDURE — 63600175 PHARM REV CODE 636 W HCPCS: Mod: TB,JG | Performed by: NURSE PRACTITIONER

## 2023-03-29 PROCEDURE — G0425 PR INPT TELEHEALTH CONSULT 30M: ICD-10-PCS | Mod: 95,,, | Performed by: PSYCHIATRY & NEUROLOGY

## 2023-03-29 PROCEDURE — 25000003 PHARM REV CODE 250: Performed by: NURSE PRACTITIONER

## 2023-03-29 PROCEDURE — 63600175 PHARM REV CODE 636 W HCPCS: Performed by: HOSPITALIST

## 2023-03-29 PROCEDURE — 94761 N-INVAS EAR/PLS OXIMETRY MLT: CPT

## 2023-03-29 PROCEDURE — 99233 PR SUBSEQUENT HOSPITAL CARE,LEVL III: ICD-10-PCS | Mod: ,,, | Performed by: INTERNAL MEDICINE

## 2023-03-29 PROCEDURE — 80053 COMPREHEN METABOLIC PANEL: CPT | Performed by: NURSE PRACTITIONER

## 2023-03-29 PROCEDURE — 85025 COMPLETE CBC W/AUTO DIFF WBC: CPT | Performed by: NURSE PRACTITIONER

## 2023-03-29 PROCEDURE — 27000221 HC OXYGEN, UP TO 24 HOURS

## 2023-03-29 PROCEDURE — 99900035 HC TECH TIME PER 15 MIN (STAT)

## 2023-03-29 PROCEDURE — 63600175 PHARM REV CODE 636 W HCPCS: Performed by: INTERNAL MEDICINE

## 2023-03-29 PROCEDURE — 99233 SBSQ HOSP IP/OBS HIGH 50: CPT | Mod: ,,, | Performed by: INTERNAL MEDICINE

## 2023-03-29 PROCEDURE — 20000000 HC ICU ROOM

## 2023-03-29 PROCEDURE — 63600175 PHARM REV CODE 636 W HCPCS: Performed by: NURSE PRACTITIONER

## 2023-03-29 PROCEDURE — 82550 ASSAY OF CK (CPK): CPT | Performed by: INTERNAL MEDICINE

## 2023-03-29 PROCEDURE — 25000003 PHARM REV CODE 250: Performed by: INTERNAL MEDICINE

## 2023-03-29 PROCEDURE — G0425 INPT/ED TELECONSULT30: HCPCS | Mod: 95,,, | Performed by: PSYCHIATRY & NEUROLOGY

## 2023-03-29 PROCEDURE — C9113 INJ PANTOPRAZOLE SODIUM, VIA: HCPCS | Performed by: NURSE PRACTITIONER

## 2023-03-29 PROCEDURE — S0166 INJ OLANZAPINE 2.5MG: HCPCS | Performed by: NURSE PRACTITIONER

## 2023-03-29 RX ORDER — MORPHINE SULFATE 2 MG/ML
2 INJECTION, SOLUTION INTRAMUSCULAR; INTRAVENOUS ONCE
Status: COMPLETED | OUTPATIENT
Start: 2023-03-29 | End: 2023-03-29

## 2023-03-29 RX ORDER — ZIPRASIDONE MESYLATE 20 MG/ML
10 INJECTION, POWDER, LYOPHILIZED, FOR SOLUTION INTRAMUSCULAR EVERY 4 HOURS PRN
Status: DISCONTINUED | OUTPATIENT
Start: 2023-03-29 | End: 2023-03-29

## 2023-03-29 RX ORDER — ESCITALOPRAM OXALATE 10 MG/1
10 TABLET ORAL DAILY
Status: DISCONTINUED | OUTPATIENT
Start: 2023-03-29 | End: 2023-04-10 | Stop reason: HOSPADM

## 2023-03-29 RX ORDER — LEVETIRACETAM 500 MG/5ML
500 INJECTION, SOLUTION, CONCENTRATE INTRAVENOUS EVERY 12 HOURS
Status: DISCONTINUED | OUTPATIENT
Start: 2023-03-29 | End: 2023-03-29

## 2023-03-29 RX ORDER — LORAZEPAM 2 MG/ML
3 INJECTION INTRAMUSCULAR ONCE
Status: COMPLETED | OUTPATIENT
Start: 2023-03-29 | End: 2023-03-29

## 2023-03-29 RX ORDER — ZIPRASIDONE MESYLATE 20 MG/ML
20 INJECTION, POWDER, LYOPHILIZED, FOR SOLUTION INTRAMUSCULAR ONCE
Status: COMPLETED | OUTPATIENT
Start: 2023-03-29 | End: 2023-03-29

## 2023-03-29 RX ORDER — QUETIAPINE FUMARATE 25 MG/1
50 TABLET, FILM COATED ORAL 2 TIMES DAILY
Status: DISCONTINUED | OUTPATIENT
Start: 2023-03-29 | End: 2023-03-30

## 2023-03-29 RX ORDER — FENTANYL 50 UG/1
1 PATCH TRANSDERMAL
Status: DISCONTINUED | OUTPATIENT
Start: 2023-03-29 | End: 2023-04-01

## 2023-03-29 RX ORDER — THIAMINE HCL 100 MG
100 TABLET ORAL DAILY
Status: DISCONTINUED | OUTPATIENT
Start: 2023-03-29 | End: 2023-04-04

## 2023-03-29 RX ORDER — DIVALPROEX SODIUM 250 MG/1
250 TABLET, DELAYED RELEASE ORAL EVERY 8 HOURS
Status: DISCONTINUED | OUTPATIENT
Start: 2023-03-29 | End: 2023-04-10 | Stop reason: HOSPADM

## 2023-03-29 RX ORDER — OLANZAPINE 10 MG/2ML
10 INJECTION, POWDER, FOR SOLUTION INTRAMUSCULAR ONCE AS NEEDED
Status: COMPLETED | OUTPATIENT
Start: 2023-03-29 | End: 2023-03-29

## 2023-03-29 RX ORDER — FOLIC ACID 1 MG/1
1 TABLET ORAL DAILY
Status: DISCONTINUED | OUTPATIENT
Start: 2023-03-29 | End: 2023-04-04

## 2023-03-29 RX ORDER — FUROSEMIDE 10 MG/ML
80 INJECTION INTRAMUSCULAR; INTRAVENOUS ONCE
Status: COMPLETED | OUTPATIENT
Start: 2023-03-29 | End: 2023-03-29

## 2023-03-29 RX ADMIN — LORAZEPAM 2 MG: 2 INJECTION INTRAMUSCULAR; INTRAVENOUS at 12:03

## 2023-03-29 RX ADMIN — DIVALPROEX SODIUM 250 MG: 250 TABLET, DELAYED RELEASE ORAL at 05:03

## 2023-03-29 RX ADMIN — DIPHENHYDRAMINE HYDROCHLORIDE 50 MG: 50 INJECTION, SOLUTION INTRAMUSCULAR; INTRAVENOUS at 05:03

## 2023-03-29 RX ADMIN — FUROSEMIDE 80 MG: 10 INJECTION, SOLUTION INTRAMUSCULAR; INTRAVENOUS at 06:03

## 2023-03-29 RX ADMIN — DEXMEDETOMIDINE 1.4 MCG/KG/HR: 200 INJECTION, SOLUTION INTRAVENOUS at 06:03

## 2023-03-29 RX ADMIN — DEXMEDETOMIDINE 1.4 MCG/KG/HR: 200 INJECTION, SOLUTION INTRAVENOUS at 03:03

## 2023-03-29 RX ADMIN — LORAZEPAM 3 MG: 2 INJECTION INTRAMUSCULAR; INTRAVENOUS at 08:03

## 2023-03-29 RX ADMIN — THIAMINE HCL TAB 100 MG 100 MG: 100 TAB at 02:03

## 2023-03-29 RX ADMIN — LORAZEPAM 2 MG: 2 INJECTION INTRAMUSCULAR; INTRAVENOUS at 01:03

## 2023-03-29 RX ADMIN — LORAZEPAM 2 MG: 2 INJECTION INTRAMUSCULAR; INTRAVENOUS at 04:03

## 2023-03-29 RX ADMIN — MORPHINE SULFATE 2 MG: 2 INJECTION, SOLUTION INTRAMUSCULAR; INTRAVENOUS at 02:03

## 2023-03-29 RX ADMIN — DEXMEDETOMIDINE 1.4 MCG/KG/HR: 200 INJECTION, SOLUTION INTRAVENOUS at 10:03

## 2023-03-29 RX ADMIN — PIPERACILLIN AND TAZOBACTAM 4.5 G: 4; .5 INJECTION, POWDER, FOR SOLUTION INTRAVENOUS; PARENTERAL at 09:03

## 2023-03-29 RX ADMIN — LORAZEPAM 2 MG: 2 INJECTION INTRAMUSCULAR; INTRAVENOUS at 05:03

## 2023-03-29 RX ADMIN — FENTANYL 1 PATCH: 50 PATCH TRANSDERMAL at 02:03

## 2023-03-29 RX ADMIN — ENOXAPARIN SODIUM 40 MG: 100 INJECTION SUBCUTANEOUS at 05:03

## 2023-03-29 RX ADMIN — LORAZEPAM 2 MG: 2 INJECTION INTRAMUSCULAR; INTRAVENOUS at 09:03

## 2023-03-29 RX ADMIN — QUETIAPINE FUMARATE 50 MG: 25 TABLET ORAL at 01:03

## 2023-03-29 RX ADMIN — LORAZEPAM 2 MG: 2 INJECTION INTRAMUSCULAR; INTRAVENOUS at 02:03

## 2023-03-29 RX ADMIN — ZIPRASIDONE MESYLATE 10 MG: 20 INJECTION, POWDER, LYOPHILIZED, FOR SOLUTION INTRAMUSCULAR at 07:03

## 2023-03-29 RX ADMIN — PIPERACILLIN AND TAZOBACTAM 4.5 G: 4; .5 INJECTION, POWDER, FOR SOLUTION INTRAVENOUS; PARENTERAL at 06:03

## 2023-03-29 RX ADMIN — IBUPROFEN 800 MG: 400 TABLET, FILM COATED ORAL at 05:03

## 2023-03-29 RX ADMIN — FOLIC ACID 1 MG: 1 TABLET ORAL at 02:03

## 2023-03-29 RX ADMIN — DEXMEDETOMIDINE 0.2 MCG/KG/HR: 200 INJECTION, SOLUTION INTRAVENOUS at 09:03

## 2023-03-29 RX ADMIN — OLANZAPINE 10 MG: 10 INJECTION, POWDER, FOR SOLUTION INTRAMUSCULAR at 09:03

## 2023-03-29 RX ADMIN — LEVETIRACETAM 500 MG: 500 INJECTION, SOLUTION, CONCENTRATE INTRAVENOUS at 10:03

## 2023-03-29 RX ADMIN — PANTOPRAZOLE SODIUM 40 MG: 40 INJECTION, POWDER, FOR SOLUTION INTRAVENOUS at 09:03

## 2023-03-29 RX ADMIN — IBUPROFEN 800 MG: 400 TABLET, FILM COATED ORAL at 01:03

## 2023-03-29 RX ADMIN — DIPHENHYDRAMINE HYDROCHLORIDE 50 MG: 50 INJECTION, SOLUTION INTRAMUSCULAR; INTRAVENOUS at 10:03

## 2023-03-29 RX ADMIN — LORAZEPAM 2 MG: 2 INJECTION INTRAMUSCULAR; INTRAVENOUS at 06:03

## 2023-03-29 RX ADMIN — ZIPRASIDONE MESYLATE 20 MG: 20 INJECTION, POWDER, LYOPHILIZED, FOR SOLUTION INTRAMUSCULAR at 04:03

## 2023-03-29 RX ADMIN — PIPERACILLIN AND TAZOBACTAM 4.5 G: 4; .5 INJECTION, POWDER, FOR SOLUTION INTRAVENOUS; PARENTERAL at 01:03

## 2023-03-29 NOTE — PROGRESS NOTES
[facility]  Pulmonology  Progress Note    Patient Name: Carol Ladd Jr.  MRN: [id number]  Admission Date: 3/21/2023  Hospital Length of Stay: 8 days  Code Status: No Order  Attending Provider: Des Navarro MD  Primary Care Provider: Raymundo Cabrera MD   Principal Problem: Alcohol withdrawal syndrome with complication    Subjective:     Interval History:  Patient without complaints      Objective:     Vital Signs (Most Recent):  Temp: 99.1 °F (37.3 °C) (03/29/23 0515)  Pulse: (!) 58 (03/29/23 0515)  Resp: (!) 31 (03/29/23 0515)  BP: (!) 183/89 (03/29/23 0501)  SpO2: (!) 81 % (03/29/23 0515)   Vital Signs (24h Range):  Temp:  [98.4 °F (36.9 °C)-101.1 °F (38.4 °C)] 99.1 °F (37.3 °C)  Pulse:  [] 58  Resp:  [7-31] 31  SpO2:  [76 %-100 %] 81 %  BP: (104-191)/() 183/89     Weight: 120.6 kg (265 lb 14 oz)  Body mass index is 36.06 kg/m².      Intake/Output Summary (Last 24 hours) at 3/29/2023 0555  Last data filed at 3/29/2023 0501  Gross per 24 hour   Intake 1955.03 ml   Output 6925 ml   Net -4969.97 ml       Physical Exam  Vitals reviewed.   Constitutional:       Appearance: Normal appearance.      Interventions: He is not intubated.  HENT:      Head: Normocephalic and atraumatic.      Nose: Nose normal.      Mouth/Throat:      Mouth: Mucous membranes are dry.      Pharynx: Oropharynx is clear.   Eyes:      Extraocular Movements: Extraocular movements intact.      Conjunctiva/sclera: Conjunctivae normal.      Pupils: Pupils are equal, round, and reactive to light.   Cardiovascular:      Rate and Rhythm: Normal rate.      Heart sounds: Normal heart sounds. No murmur heard.  Pulmonary:      Effort: Pulmonary effort is normal. He is not intubated.      Breath sounds: Normal breath sounds.   Abdominal:      General: Abdomen is flat. Bowel sounds are normal.      Palpations: Abdomen is soft.   Musculoskeletal:         General: Normal range of motion.      Cervical back: Normal  range of motion and neck supple.      Right lower leg: No edema.      Left lower leg: No edema.   Skin:     General: Skin is warm and dry.      Capillary Refill: Capillary refill takes less than 2 seconds.   Neurological:      General: No focal deficit present.      Mental Status: He is alert and oriented to person, place, and time.   Psychiatric:         Mood and Affect: Mood normal.         Behavior: Behavior normal.     Review of Systems    Vents:  Vent Mode: CPAP / PSV (03/28/23 1142)  Ventilator Initiated: Yes (03/23/23 0000)  Set Rate: 12 BPM (03/28/23 0428)  Vt Set: 500 mL (03/28/23 0428)  Pressure Support: 8 cmH20 (03/28/23 1142)  PEEP/CPAP: 5 cmH20 (03/28/23 1142)  Oxygen Concentration (%): 40 (Pt. weaned down on high flow to maintain an 02 sat above 90%.) (03/29/23 0445)  Peak Airway Pressure: 14 cmH20 (03/28/23 1142)  Total Ve: 5.5 L/m (03/28/23 1142)  F/VT Ratio<105 (RSBI): (!) 27.66 (03/28/23 0015)    Lines/Drains/Airways       Drain  Duration                  NG/OG Tube 03/23/23 0014 [place] sump Right nostril 6 days         Urethral Catheter 03/23/23 1000 Non-latex 16 Fr. 5 days              Airway  Duration                  Airway - Non-Surgical 03/22/23 2358 Endotracheal Tube 6 days              Peripheral Intravenous Line  Duration                  Peripheral IV - Single Lumen 03/25/23 1929 20 G Anterior;Left;Proximal Forearm 3 days         Peripheral IV - Single Lumen 03/27/23 1730 20 G Anterior;Distal;Left Forearm 1 day         Peripheral IV - Single Lumen 03/27/23 2141 20 G Right Antecubital 1 day                    Significant Labs:    CBC/Anemia Profile:  Recent Labs   Lab 03/28/23  0511   WBC 6.08   HGB 6.9*   HCT 21.7*      .9*   RDW 15.5*        Chemistries:  Recent Labs   Lab 03/27/23  1432 03/28/23  0511    138   K 3.5 4.0   CL 90* 93*   CO2 45* 40*   BUN 27* 25*   CREATININE 2.83* 2.17*   CALCIUM 7.2* 7.6*   ALBUMIN  --  2.2*   PROT  --  6.4   BILITOT  --  1.2   ALKPHOS   --  81   ALT  --  42   AST  --  68*       Recent Lab Results         03/28/23  1727   03/28/23  0839        Unit Blood Type Code 5100         Unit Expiration [id number]         CROSSMATCH INTERPRETATION Compatible         DISPENSE STATUS Transfused         Group & Rh O POS         INDIRECT MITALI NEG         POC Base Excess   23.7       POC HCO3   51.4       POC PCO2   69       POC PH   7.48       POC PO2   82       POC SATURATED O2   97       Product Code [id number]         Specimen Outdate 03/31/2023 23:59         Unit ABO/Rh O POS         UNIT NUMBER [id number]                 Significant Imaging:  I have reviewed all pertinent imaging results/findings within the past 24 hours.    Assessment/Plan:     Neuro  Tourette syndrome  Not much of an issue now but lots of psychiatric issues    Psychiatric  * Alcohol withdrawal syndrome with complication  I think resolved this point      Pulmonary  Acute respiratory failure with hypoxia and hypercarbia  He is on high-flow oxygen this morning 40% 40 L will continue diurese him consider BiPAP at night    Cardiac/Vascular  HTN (hypertension)  Blood pressure is up but there is lots of agitation also    Renal/  Metabolic acidosis  Resolved    Acute kidney injury  Waiting lab today creatinine 2.17 yesterday making great urine      Orthopedic  Rhabdomyolysis  CPK continues to fall doubtful rhabdo at this point    Other  Fever  Continues to be an intermittent problem I think probably more atelectasis than anything else will continue to watch white count is okay      Obstructive sleep apnea  Noted use BiPAP at night                 Des Navarro MD  Pulmonology  [facility]

## 2023-03-29 NOTE — CONSULTS
"[facility]  Psychiatry  Telepsychiatry Consult Note    Please see previous notes:    Patient agreeable to consultation via telepsychiatry.    Tele-Consultation from Psychiatry started: 3/29/2023 at 12:33 PM  The chief complaint leading to psychiatric consultation is: "from psych facility, ETOH abuse"  This consultation was requested by Dr Naavrro, the ICU attending physician.  The location of the consulting psychiatrist is [place].  The patient location is  [facility]   The patient was admitted 3/21/23   Also present with the patient at the time of the consultation: RN, charge RN    Patient Identification:   Carol Ladd Jr. is a 47 y.o. male.    Patient information was obtained from patient, past medical records, ER records, primary team, and RN .    Inpatient consult to Telemedicine - Psychiatry  Consult performed by: Carisa Hannon MD  Consult ordered by: Des Navarro MD      Teleconsult Time Documentation  Subjective:     History of Present Illness:  Per H&P 3/21/23: "48 y/o male who was transferred to [facility] ER for increased agitation from [place]. He was admitted there 48 hours ago for ETOH detox. He drinks over 1/5 of alcohol daily and has for several years to help with his tourette's.  Family states that he helps better than medication.  He has also been on Subaxone for several years as well.  His last drink for 03/19. ETOH level was over 400 at [facility].  On arrival to the ER his HR was 160 and he was diaphoretic.  Lactic acid was 10 on ABG and CK 3,333 and Cr 4.38. He was treated with 3 liters NS. He then became hypotensive and was started on Levophed.   On exam he is lethargic due to receiving 6 mg IV ativan but he states that his muscle spasm/contractions are similar to his tics but much worse.  He was started on several psych medications at [facility] but was not taking any at home.      PMH - HTN, ODD, tourette's, opoid abuse and ETOH abuse.   "     Pt is a 47 " y/o male with PMH as below and past psychiatric hx per chart of tourette's d/o, MDD, alcohol use d/o, anxiety, ADHD currently admitted to ICU as above. Chart reviewed; admitted to to [facility] for alcohol detox 3/19, admitted to [facility] 3/21, received multiple PRNs last 24 hrs including Zyprexa 10 mg 0932, Geodon 20 mg IM @ 1910, 20 mg IM 0405, 10 mg IM 0708, Ativan 15 mg total IV in last 24 hrs. Per charge RN and pt's RN pt self extubated yesterday, + RIS--talking to dogs, thinks his mom is behind him, spitting, angry, trying to get out of bed. Pt has been disoriented and agitated throughout admission, no clear improvement with any intervention thus far. Says they have called pt's sister and mother, live approx 3 hrs away, no family visitors. Started precedex, switched to propofol, switched back to precedex today. On interview, pt awakens, able to mumble some words, raise index finger, unable to give thumbs up or show 3 fingers, looks at his hand with confused look on his face. Due to the patient's inability to logically or linearly participate in the psychiatric assessment, I reached out to pt's mother (# on the EMR facesheet) for collateral information--no answer.    Per chart review:  Psychiatric History:   Previous Psychiatric Hospitalizations: Yes [facility] March 2023 for alcohol detox  Previous Medication Trials: Yes Zoloft, Prozac, Doxepin, Trazodone, Zyprexa, Strattera, Suboxone  Previous Suicide Attempts: LIZZY  History of Violence: LIZZY  History of Depression: yes  History of Shikha: LIZZY  History of Auditory/Visual Hallucination LIZZY  History of Delusions: LIZZY  Outpatient psychiatrist (current & past): LIZZY; current meds rx'd by PCP Dr Calixto Ram    Substance Abuse History:  Tobacco:LIZZY  Alcohol: Yes  Illicit Substances:LIZZY; rx'd suboxone  Detox/Rehab: Yes    Legal History: Past charges/incarcerations: LIZZY    Family Psychiatric History: sister & mother-ocd depression, sister & father-alcohol abuse      Social  "History:  Developmental/Childhood:LIZZY  *Education:LIZZY  Employment Status/Finances:[employer]  Relationship Status/Sexual Orientation: never   Children: LIZZY  Housing Status: Home    history:  [employer]  Access to gun: [employer]  Lutheran:not Jehovah's witness  Recreational activities:[employer]    Psychiatric Mental Status Exam:  Arousal: drowsy  Sensorium/Orientation: oriented to self, disoriented to year, situation, location ("[employer]")  Behavior/Cooperation: able to follow select commands   Speech: nonspontaneous, mumbled, soft, raspy  Language: unable to assess  Mood: unable to assess  Affect: unable to assess  Thought Process: unable to assess  Thought Content:   Auditory hallucinations: unable to assess  Visual hallucinations: unable to assess  Paranoia: unable to assess  Delusions:  unable to assess  Suicidal ideation: unable to assess  Homicidal ideation: unable to assess  Attention/Concentration:  Impaired  Memory:    Recent: unable to assess   Remote: unable to assess  Fund of Knowledge: unable to assess  Abstract reasoning: unable to assess  Insight:limited  Judgment:limited      Past Medical History:   Past Medical History:   Diagnosis Date    Hypertension     Mixed obsessional thoughts and acts 01/24/2017    Tourette syndrome     Tourette's syndrome 01/24/2017      Laboratory Data:   Labs Reviewed   COMPREHENSIVE METABOLIC PANEL - Abnormal; Notable for the following components:       Result Value    CO2 18 (*)     Anion Gap 30 (*)     Glucose 137 (*)     BUN 38 (*)     Creatinine 4.37 (*)     ALT 68 (*)      (*)     eGFR 16 (*)     All other components within normal limits   URINALYSIS, REFLEX TO URINE CULTURE - Abnormal; Notable for the following components:    Protein, UA 70 (*)     Glucose,  (*)     Blood, UA Large (*)     All other components within normal limits   CK - Abnormal; Notable for the following components:    CK 3,333 (*)     All other components within normal limits   MYOGLOBIN, SERUM - " Abnormal; Notable for the following components:    Myoglobin 6,355 (*)     All other components within normal limits   CBC WITH DIFFERENTIAL - Abnormal; Notable for the following components:    WBC 16.06 (*)     RBC 3.43 (*)     Hemoglobin 12.1 (*)     Hematocrit 36.6 (*)     .7 (*)     MCH 35.3 (*)     RDW 15.0 (*)     Platelet Count 140 (*)     Neutrophils % 77.4 (*)     Lymphocytes % 14.3 (*)     Monocytes % 6.5 (*)     Eosinophils % 0.6 (*)     Immature Granulocytes % 0.6 (*)     nRBC, Auto 0.1 (*)     Neutrophils, Abs 12.45 (*)     Monocytes, Absolute 1.04 (*)     Immature Granulocytes, Absolute 0.09 (*)     nRBC, Absolute 0.02 (*)     All other components within normal limits   LACTIC ACID, PLASMA - Abnormal; Notable for the following components:    Lactic Acid 7.8 (*)     All other components within normal limits   URINALYSIS, MICROSCOPIC - Abnormal; Notable for the following components:    Bacteria, UA Moderate (*)     Fine Granular Casts, UA 0-2 (*)     All other components within normal limits   CBC W/ AUTO DIFFERENTIAL    Narrative:     The following orders were created for panel order CBC auto differential.  Procedure                               Abnormality         Status                     ---------                               -----------         ------                     CBC with Differential[[id number]]        Abnormal            Final result                 Please view results for these tests on the individual orders.   CBC MORPHOLOGY       Neurological History:  Seizures: Yes  Head trauma: LIZZY    Allergies:   Review of patient's allergies indicates:   Allergen Reactions    Haldol [haloperidol lactate]        Medications in ER:   Medications   LORazepam injection 2 mg (2 mg Intravenous Given 3/29/23 9245)   aspirin EC tablet 81 mg (81 mg Oral Not Given 3/29/23 0900)   diphenhydrAMINE injection 50 mg (50 mg Intravenous Given 3/29/23 1033)   etomidate (AMIDATE) 2 mg/mL injection (has no  administration in time range)   rocuronium 10 mg/mL injection (has no administration in time range)   acetaminophen tablet 1,000 mg (1,000 mg Per NG tube Given 3/26/23 0958)   ibuprofen tablet 800 mg (800 mg Oral Given 3/29/23 0132)   polyethylene glycol packet 17 g (17 g Oral Not Given 3/29/23 0900)   piperacillin-tazobactam (ZOSYN) 4.5 g in dextrose 5 % in water (D5W) 5 % 100 mL IVPB (MB+) (0 g Intravenous Stopped 3/29/23 1018)   enoxaparin injection 40 mg (40 mg Subcutaneous Given 3/28/23 1647)   pantoprazole injection 40 mg (40 mg Intravenous Given 3/29/23 0943)   0.9%  NaCl infusion (for blood administration) ( Intravenous New Bag 3/28/23 2107)   EScitalopram oxalate tablet 10 mg (10 mg Oral Not Given 3/29/23 0900)   ziprasidone injection 10 mg (10 mg Intramuscular Given 3/29/23 0708)   dexmedeTOMIDine (PRECEDEX) 400 mcg in sodium chloride 0.9% 100 mL infusion (1.3 mcg/kg/hr × 120.6 kg Intravenous Rate/Dose Change 3/29/23 1200)   levETIRAcetam injection 500 mg (500 mg Intravenous Given 3/29/23 1032)   sodium chloride 0.9% 1,000 mL with mvi, (ADULT) no.4 with vit K 3,300 unit- 150 mcg/10 mL 10 mL, thiamine 100 mg, folic acid 1 mg infusion ( Intravenous Rate/Dose Change 3/21/23 1422)   metoprolol injection 5 mg (5 mg Intravenous Given 3/21/23 1311)   sodium chloride 0.9% bolus 1,000 mL 1,000 mL (0 mLs Intravenous Stopped 3/21/23 1419)   sodium chloride 0.9% bolus 1,000 mL 1,000 mL (0 mLs Intravenous Stopped 3/21/23 1422)   sodium chloride 0.9% 1,000 mL with mvi, (ADULT) no.4 with vit K 3,300 unit- 150 mcg/10 mL 10 mL, thiamine 100 mg, folic acid 1 mg infusion ( Intravenous Stopped 3/24/23 1700)   mupirocin 2 % ointment ( Nasal Given 3/26/23 0929)   heparin 25,000 units in dextrose 5% (100 units/ml) IV bolus from bag INITIAL BOLUS (max bolus 4000 units) (4,000 Units Intravenous Bolus from Bag 3/21/23 2040)   diphenhydrAMINE injection 50 mg (50 mg Intravenous Given 3/21/23 3654)   perflutren lipid microspheres  injection 1.5 mL (1.5 mLs Intravenous Given 3/22/23 0828)   LORazepam injection 2 mg (2 mg Intravenous Given 3/22/23 1209)   LORazepam injection 2 mg (2 mg Intravenous Given 3/22/23 1517)   LORazepam injection 2 mg (2 mg Intravenous Given 3/22/23 1915)   etomidate injection (20 mg Intravenous Given 3/22/23 2357)   rocuronium injection (100 mg Intravenous Given 3/22/23 2357)   furosemide injection 40 mg (40 mg Intravenous Given 3/28/23 2100)   ziprasidone injection 20 mg (20 mg Intramuscular Given 3/28/23 1910)   ziprasidone injection 20 mg (20 mg Intramuscular Given 3/29/23 0405)   furosemide injection 80 mg (80 mg Intravenous Given 3/29/23 0612)   LORazepam injection 3 mg (3 mg Intravenous Given 3/29/23 0810)   OLANZapine injection 10 mg (10 mg Intramuscular Given 3/29/23 0932)       Medications at home: LIZZY    Per [place]/MS :  03/09/2023 Buprenorphine-Nalox 8-2mg Film   45.00 30 Ja Mar Nor ([id number]) MS   02/28/2023 Testosterone Cyp 200 Mg/ml   10.00 30 Ja Mar Nor ([id number]) MS   02/23/2023 Vyvanse 30 Mg Capsule   60.00 30 Ja Mar Nor ([id number]) MS   02/06/2023 Buprenorphine-Nalox 8-2mg Film   45.00 30 Ja Mar Nor ([id number]) MS   01/10/2023 Chlordiazepoxide 25 Mg Capsule   90.00 30 Ja Mar Nor ([id number]) MS   01/06/2023 Buprenorphine-Nalox 8-2mg Film   45.00 30 Ja Mar Nor ([id number]) MS   12/06/2022 Dextroamp-Amphetamin 30 Mg Tab   60.00 30 Ja Mar Nor ([id number]) MS   12/06/2022 Buprenorphine-Nalox 8-2mg Film   45.00 30 Ja Mar Nor ([id number]) MS   12/06/2022 Clonazepam 1 Mg Tablet   60.00 30 Ja Mar Nor ([id number]) MS   11/09/2022 Buprenorphine-Nalox 8-2mg Film   45.00 30 Ja Mar Nor ([id number]) MS   11/07/2022 Dextroamp-Amphetamin 30 Mg Tab   60.00 30 Ja Mar Nor ([id number]) MS   10/28/2022 Clonazepam 1 Mg Tablet   60.00 20 Ja Mar Nor ([id number]) MS   10/12/2022 Chlordiazepoxide 25 Mg Capsule   90.00 30 Ja Mar Nor (0612) MS   10/07/2022 Dextroamp-Amphetamin 30 Mg Tab   60.00 30 Ja Mar Nor (6292) MS   10/07/2022 Buprenorphine-Nalox 8-2mg Film   45.00 30 Ja Mar Nor ([id number]) MS        Assessment - Diagnosis - Goals:     Diagnosis/Impression:   Acute encephalopathy, hyperactive (last drink presumably approx 3/19, now outside window for acute WD; wide differential includes uremia, EVELYN/rhabdo, neuroleptic malignant syndrome, infection (respiratory vs other--on abx), vascular, medication induced, wernickes)    Alcohol use d/o    Rec:   - add thiamine, folate supplementation  - change seizure medication from keppra to depakote--can be used off label for delirium, continue to monitor LFTs for any worsening, ammonia level prev wnl; can check VPA level after 72 hrs  - consider ketamine for medication resistant agitation  - would attempt to slowly decrease amount of benzodiazepines and opioids given as may be contributing to encephalopathy while avoiding acute WD  - discontinue antipsychotics, lexapro, PRN bendaryl until NMS ruled out  DELIRIUM BEHAVIOR MANAGEMENT  PLEASE utilize CHEMICAL restraints with PRN meds first for agitation. Minimize use of PHYSICAL restraints OR have periods of being out of physical restraints if possible.  Keep window shades open and room lit during day and room dim at night in order to promote normal sleep-wake cycles  Encourage family at bedside. [place] patient often to situation, location, date- obtain collateral as able  Continue to Limit or Discontinue use of Narcotics, Benzos and Anti-cholinergic medications as they may worsen delirium  Continue medical workup for causative etiology of Delirium--consider MRI, LP, EEG to r/o status      Time with patient, chart: 80 min      More than 50% of the time was spent counseling/coordinating care    Consulting clinician was informed of the encounter and consult note.    Consultation ended: 3/29/2023 at 1:58 PM        Carisa Hannon MD   Psychiatry  [facility]     No

## 2023-03-29 NOTE — PROGRESS NOTES
[facility]  Adult Nutrition  Follow Up Note    SUMMARY       Recommendations    Recommendation/Intervention: Recommend continue with tube feedings as able/appropriate.  Goals: tube feeding tolerance, weight maintenance  Nutrition Goal Status: progressing towards goal  Communication of RD Recs: discussed on rounds    Assessment and Plan  RD follow up. Current weight is 265 lbs. Pt extubated on 3/28/23. ST eval ordered for 3/29/23 to eval for swallowing for diet upgrade. Pending ST evaluation results can reassess need for tube feeds. Current tube feeding order provides 1632 cals, 94g pro and 2133ml H2O.     If patient unable to eat po foods recommend increasing tube feeding order to Jevity 1.5@ 60ml/h with 50ml free water flush + prosource BID. Will provide 2280cals , 121g pro and 2294ml H20    Pt continues on tube feedings of Jevity 1.5 (isosource may be used in place of Jevity as needed due to supply issues) + Prosource BID + free water flush @ 60 mL/hr providin kcals, 94 g PRO, ~2133 mL H2O.  Continue as tolerated.     Last BM 3/24 per flowsheets. Labs/meds reviewed. RD following.     Nutrition Diagnosis  Inadequate energy intake   related to patient intubated and sedated as evidenced by requiring NG tube feeds for nutrition     Nutrition Diagnosis Status: Chronic/ continues  Patient extubated 3/28 and continues with NG tube pending ST eval.    Reason for Assessment    Reason For Assessment: RD follow-up  Relevant Medical History: alcohol withdrawal, toureettes, EVELYN, depression, metabolic acidosis, AMS    Nutrition Risk Screen    Nutrition Risk Screen: no indicators present    Nutrition/Diet History    Food Allergies: NKFA  Factors Affecting Nutritional Intake: NPO, on mechanical ventilation    Anthropometrics    Temp: (!) 100.4 °F (38 °C)  Height: 6' (182.9 cm)  Height (inches): 72 in  Weight Method: Bed Scale  Weight: 120.6 kg (265 lb 14 oz)  Weight (lb): 265.88 lb  Ideal Body Weight  (IBW), Male: 178 lb  % Ideal Body Weight, Male (lb): 142.06 %  BMI (Calculated): 36.1       Lab/Procedures/Meds   Latest Reference Range & Units 03/27/23 04:43   Sodium 136 - 145 mmol/L 138   Potassium 3.5 - 5.1 mmol/L 3.2 (L)   Chloride 98 - 107 mmol/L 88 (L)   CO2 21 - 32 mmol/L 45 (H)   Anion Gap 7 - 16 mmol/L 8   BUN 7 - 18 mg/dL 24 (H)   Creatinine 0.70 - 1.30 mg/dL 3.19 (H)   BUN/CREAT RATIO 6 - 20  8   eGFR >=60 mL/min/1.73m² 24 (L)   Glucose 74 - 106 mg/dL 101   Calcium 8.5 - 10.1 mg/dL 7.6 (L)   Alkaline Phosphatase 45 - 115 U/L 68   PROTEIN TOTAL 6.4 - 8.2 g/dL 7.1   Albumin 3.5 - 5.0 g/dL 2.6 (L)   Albumin/Globulin Ratio  0.6   BILIRUBIN TOTAL >0.0 - 1.2 mg/dL 1.0   AST 15 - 37 U/L 51 (H)   ALT 16 - 61 U/L 48   (L): Data is abnormally low  (H): Data is abnormally high    Note: Low K - replete to WNL as needed. Elevated BUN, Cr and low GFR - monitor renal labs. Will adjust protein recommendations as needed. Elevated AST. Low albumin.     Pertinent Labs Reviewed: reviewed  Pertinent Labs Comments:   Pertinent Medications Reviewed: reviewed  Pertinent Medications Comments: aspirin, enoxaparin, levetiracetam, pantoprazole, zosyn, polyethylene glycol    Estimated/Assessed Needs    Weight Used For Calorie Calculations: 87.7 kg (193 lb 5.5 oz) (adjusted weight used)  Energy Calorie Requirements (kcal): 9254-1011  Energy Need Method: Kcal/kg  Protein Requirements:   Weight Used For Protein Calculations: 87.7 kg (193 lb 5.5 oz)        RDA Method (mL): 2192         Nutrition Prescription Ordered    Current Diet Order: NPO+ tube feedings  Current Nutrition Support Formula Ordered: Jevity 1.5  Current Nutrition Support Rate Ordered: 42 (ml)  Current Nutrition Support Frequency Ordered: continuous    Evaluation of Received Nutrient/Fluid Intake    Enteral Calories (kcal): 1632  Enteral Protein (gm): 94  Enteral (Free Water) Fluid (mL): 693  Free Water Flush Fluid (mL): 1440  Other Calories (kcal): 726  Total  Calories (kcal): 2358  % Kcal Needs: 100  Total Protein (gm): 94  % Protein Needs: 100  Tolerance: tolerating  % Intake of Estimated Energy Needs: 75 - 100 %  % Meal Intake: NPO    Nutrition Risk    Level of Risk/Frequency of Follow-up: high     Monitor and Evaluation    Food and Nutrient Intake: enteral nutrition intake  Food and Nutrient Adminstration: enteral and parenteral nutrition administration  Anthropometric Measurements: weight change, weight  Biochemical Data, Medical Tests and Procedures: lipid profile, inflammatory profile, glucose/endocrine profile, gastrointestinal profile, electrolyte and renal panel  Nutrition-Focused Physical Findings: overall appearance     Nutrition Follow-Up    RD Follow-up?: Yes

## 2023-03-29 NOTE — ASSESSMENT & PLAN NOTE
Continues to be an intermittent problem I think probably more atelectasis than anything else will continue to watch white count is okay

## 2023-03-29 NOTE — NURSING
Pt. Has received several different narcotic meds to help him relax. Pt. Is scheduled to receive 1 unit of blood tonight. I cannot get in touch with his mom per telephone. I will continue to try to reach her. I need to speak with her to get consent for the blood transfusion.

## 2023-03-29 NOTE — SUBJECTIVE & OBJECTIVE
Interval History:  Patient without complaints      Objective:     Vital Signs (Most Recent):  Temp: 99.1 °F (37.3 °C) (03/29/23 0515)  Pulse: (!) 58 (03/29/23 0515)  Resp: (!) 31 (03/29/23 0515)  BP: (!) 183/89 (03/29/23 0501)  SpO2: (!) 81 % (03/29/23 0515)   Vital Signs (24h Range):  Temp:  [98.4 °F (36.9 °C)-101.1 °F (38.4 °C)] 99.1 °F (37.3 °C)  Pulse:  [] 58  Resp:  [7-31] 31  SpO2:  [76 %-100 %] 81 %  BP: (104-191)/() 183/89     Weight: 120.6 kg (265 lb 14 oz)  Body mass index is 36.06 kg/m².      Intake/Output Summary (Last 24 hours) at 3/29/2023 0555  Last data filed at 3/29/2023 0501  Gross per 24 hour   Intake 1955.03 ml   Output 6925 ml   Net -4969.97 ml       Physical Exam  Vitals reviewed.   Constitutional:       Appearance: Normal appearance.      Interventions: He is not intubated.  HENT:      Head: Normocephalic and atraumatic.      Nose: Nose normal.      Mouth/Throat:      Mouth: Mucous membranes are dry.      Pharynx: Oropharynx is clear.   Eyes:      Extraocular Movements: Extraocular movements intact.      Conjunctiva/sclera: Conjunctivae normal.      Pupils: Pupils are equal, round, and reactive to light.   Cardiovascular:      Rate and Rhythm: Normal rate.      Heart sounds: Normal heart sounds. No murmur heard.  Pulmonary:      Effort: Pulmonary effort is normal. He is not intubated.      Breath sounds: Normal breath sounds.   Abdominal:      General: Abdomen is flat. Bowel sounds are normal.      Palpations: Abdomen is soft.   Musculoskeletal:         General: Normal range of motion.      Cervical back: Normal range of motion and neck supple.      Right lower leg: No edema.      Left lower leg: No edema.   Skin:     General: Skin is warm and dry.      Capillary Refill: Capillary refill takes less than 2 seconds.   Neurological:      General: No focal deficit present.      Mental Status: He is alert and oriented to person, place, and time.   Psychiatric:         Mood and Affect:  Mood normal.         Behavior: Behavior normal.     Review of Systems    Vents:  Vent Mode: CPAP / PSV (03/28/23 1142)  Ventilator Initiated: Yes (03/23/23 0000)  Set Rate: 12 BPM (03/28/23 0428)  Vt Set: 500 mL (03/28/23 0428)  Pressure Support: 8 cmH20 (03/28/23 1142)  PEEP/CPAP: 5 cmH20 (03/28/23 1142)  Oxygen Concentration (%): 40 (Pt. weaned down on high flow to maintain an 02 sat above 90%.) (03/29/23 0445)  Peak Airway Pressure: 14 cmH20 (03/28/23 1142)  Total Ve: 5.5 L/m (03/28/23 1142)  F/VT Ratio<105 (RSBI): (!) 27.66 (03/28/23 0015)    Lines/Drains/Airways       Drain  Duration                  NG/OG Tube 03/23/23 0014 [place] sump Right nostril 6 days         Urethral Catheter 03/23/23 1000 Non-latex 16 Fr. 5 days              Airway  Duration                  Airway - Non-Surgical 03/22/23 2358 Endotracheal Tube 6 days              Peripheral Intravenous Line  Duration                  Peripheral IV - Single Lumen 03/25/23 1929 20 G Anterior;Left;Proximal Forearm 3 days         Peripheral IV - Single Lumen 03/27/23 1730 20 G Anterior;Distal;Left Forearm 1 day         Peripheral IV - Single Lumen 03/27/23 2141 20 G Right Antecubital 1 day                    Significant Labs:    CBC/Anemia Profile:  Recent Labs   Lab 03/28/23  0511   WBC 6.08   HGB 6.9*   HCT 21.7*      .9*   RDW 15.5*        Chemistries:  Recent Labs   Lab 03/27/23  1432 03/28/23  0511    138   K 3.5 4.0   CL 90* 93*   CO2 45* 40*   BUN 27* 25*   CREATININE 2.83* 2.17*   CALCIUM 7.2* 7.6*   ALBUMIN  --  2.2*   PROT  --  6.4   BILITOT  --  1.2   ALKPHOS  --  81   ALT  --  42   AST  --  68*       Recent Lab Results         03/28/23  1727   03/28/23  0839        Unit Blood Type Code 5100         Unit Expiration [id number]         CROSSMATCH INTERPRETATION Compatible         DISPENSE STATUS Transfused         Group & Rh O POS         INDIRECT MITALI NEG         POC Base Excess   23.7       POC HCO3   51.4       POC PCO2    69       POC PH   7.48       POC PO2   82       POC SATURATED O2   97       Product Code [id number]         Specimen Outdate 03/31/2023 23:59         Unit ABO/Rh O POS         UNIT NUMBER [id number]                 Significant Imaging:  I have reviewed all pertinent imaging results/findings within the past 24 hours.

## 2023-03-29 NOTE — PT/OT/SLP PROGRESS
Speech Language Pathology      Carol Mark Ladd .  MRN: [id number]    Patient not seen today secondary to Nursing hold (Comment) (SLP spoke with JILLIAN Delgadillo who said to hold on pt today secondary to pt being too agitated.). Will follow-up 3/30/23.

## 2023-03-30 LAB
ALBUMIN SERPL BCP-MCNC: 2.5 G/DL (ref 3.5–5)
ALBUMIN/GLOB SERPL: 0.5 {RATIO}
ALP SERPL-CCNC: 84 U/L (ref 45–115)
ALT SERPL W P-5'-P-CCNC: 41 U/L (ref 16–61)
ANION GAP SERPL CALCULATED.3IONS-SCNC: 13 MMOL/L (ref 7–16)
AST SERPL W P-5'-P-CCNC: 71 U/L (ref 15–37)
BASOPHILS # BLD AUTO: 0.08 K/UL (ref 0–0.2)
BASOPHILS NFR BLD AUTO: 0.7 % (ref 0–1)
BILIRUB SERPL-MCNC: 1.7 MG/DL (ref ?–1.2)
BUN SERPL-MCNC: 36 MG/DL (ref 7–18)
BUN/CREAT SERPL: 18 (ref 6–20)
CALCIUM SERPL-MCNC: 8.8 MG/DL (ref 8.5–10.1)
CHLORIDE SERPL-SCNC: 101 MMOL/L (ref 98–107)
CK SERPL-CCNC: 1669 U/L (ref 39–308)
CO2 SERPL-SCNC: 35 MMOL/L (ref 21–32)
CREAT SERPL-MCNC: 2.03 MG/DL (ref 0.7–1.3)
DIFFERENTIAL METHOD BLD: ABNORMAL
EGFR (NO RACE VARIABLE) (RUSH/TITUS): 40 ML/MIN/1.73M²
EOSINOPHIL # BLD AUTO: 0.06 K/UL (ref 0–0.5)
EOSINOPHIL NFR BLD AUTO: 0.5 % (ref 1–4)
ERYTHROCYTE [DISTWIDTH] IN BLOOD BY AUTOMATED COUNT: 16.8 % (ref 11.5–14.5)
GLOBULIN SER-MCNC: 5.1 G/DL (ref 2–4)
GLUCOSE SERPL-MCNC: 123 MG/DL (ref 74–106)
HCT VFR BLD AUTO: 26.8 % (ref 40–54)
HGB BLD-MCNC: 8.9 G/DL (ref 13.5–18)
IMM GRANULOCYTES # BLD AUTO: 0.18 K/UL (ref 0–0.04)
IMM GRANULOCYTES NFR BLD: 1.5 % (ref 0–0.4)
LYMPHOCYTES # BLD AUTO: 1.23 K/UL (ref 1–4.8)
LYMPHOCYTES NFR BLD AUTO: 10 % (ref 27–41)
MCH RBC QN AUTO: 34.4 PG (ref 27–31)
MCHC RBC AUTO-ENTMCNC: 33.2 G/DL (ref 32–36)
MCV RBC AUTO: 103.5 FL (ref 80–96)
MONOCYTES # BLD AUTO: 1.64 K/UL (ref 0–0.8)
MONOCYTES NFR BLD AUTO: 13.4 % (ref 2–6)
MPC BLD CALC-MCNC: 9.9 FL (ref 9.4–12.4)
NEUTROPHILS # BLD AUTO: 9.09 K/UL (ref 1.8–7.7)
NEUTROPHILS NFR BLD AUTO: 73.9 % (ref 53–65)
NRBC # BLD AUTO: 0.04 X10E3/UL
NRBC, AUTO (.00): 0.3 %
PLATELET # BLD AUTO: 586 K/UL (ref 150–400)
POTASSIUM SERPL-SCNC: 3 MMOL/L (ref 3.5–5.1)
PROT SERPL-MCNC: 7.6 G/DL (ref 6.4–8.2)
RBC # BLD AUTO: 2.59 M/UL (ref 4.6–6.2)
SODIUM SERPL-SCNC: 146 MMOL/L (ref 136–145)
WBC # BLD AUTO: 12.28 K/UL (ref 4.5–11)

## 2023-03-30 PROCEDURE — 63600175 PHARM REV CODE 636 W HCPCS: Performed by: HOSPITALIST

## 2023-03-30 PROCEDURE — 63600175 PHARM REV CODE 636 W HCPCS: Performed by: NURSE PRACTITIONER

## 2023-03-30 PROCEDURE — 94761 N-INVAS EAR/PLS OXIMETRY MLT: CPT

## 2023-03-30 PROCEDURE — 25000003 PHARM REV CODE 250: Performed by: INTERNAL MEDICINE

## 2023-03-30 PROCEDURE — 82550 ASSAY OF CK (CPK): CPT | Performed by: INTERNAL MEDICINE

## 2023-03-30 PROCEDURE — 92610 EVALUATE SWALLOWING FUNCTION: CPT

## 2023-03-30 PROCEDURE — 99233 SBSQ HOSP IP/OBS HIGH 50: CPT | Mod: ,,, | Performed by: INTERNAL MEDICINE

## 2023-03-30 PROCEDURE — C9113 INJ PANTOPRAZOLE SODIUM, VIA: HCPCS | Performed by: NURSE PRACTITIONER

## 2023-03-30 PROCEDURE — 25000003 PHARM REV CODE 250: Performed by: NURSE PRACTITIONER

## 2023-03-30 PROCEDURE — 99900035 HC TECH TIME PER 15 MIN (STAT)

## 2023-03-30 PROCEDURE — 20000000 HC ICU ROOM

## 2023-03-30 PROCEDURE — 85025 COMPLETE CBC W/AUTO DIFF WBC: CPT | Performed by: NURSE PRACTITIONER

## 2023-03-30 PROCEDURE — 27000221 HC OXYGEN, UP TO 24 HOURS

## 2023-03-30 PROCEDURE — 63600175 PHARM REV CODE 636 W HCPCS: Performed by: INTERNAL MEDICINE

## 2023-03-30 PROCEDURE — 80053 COMPREHEN METABOLIC PANEL: CPT | Performed by: NURSE PRACTITIONER

## 2023-03-30 PROCEDURE — 25000003 PHARM REV CODE 250: Performed by: HOSPITALIST

## 2023-03-30 PROCEDURE — 99233 PR SUBSEQUENT HOSPITAL CARE,LEVL III: ICD-10-PCS | Mod: ,,, | Performed by: INTERNAL MEDICINE

## 2023-03-30 RX ORDER — POTASSIUM CHLORIDE 7.45 MG/ML
10 INJECTION INTRAVENOUS
Status: COMPLETED | OUTPATIENT
Start: 2023-03-30 | End: 2023-03-30

## 2023-03-30 RX ADMIN — FOLIC ACID 1 MG: 1 TABLET ORAL at 08:03

## 2023-03-30 RX ADMIN — ESCITALOPRAM OXALATE 10 MG: 10 TABLET ORAL at 08:03

## 2023-03-30 RX ADMIN — DEXMEDETOMIDINE 1.4 MCG/KG/HR: 200 INJECTION, SOLUTION INTRAVENOUS at 04:03

## 2023-03-30 RX ADMIN — POTASSIUM CHLORIDE 10 MEQ: 7.46 INJECTION, SOLUTION INTRAVENOUS at 11:03

## 2023-03-30 RX ADMIN — QUETIAPINE FUMARATE 50 MG: 25 TABLET ORAL at 08:03

## 2023-03-30 RX ADMIN — PIPERACILLIN AND TAZOBACTAM 4.5 G: 4; .5 INJECTION, POWDER, FOR SOLUTION INTRAVENOUS; PARENTERAL at 01:03

## 2023-03-30 RX ADMIN — DEXMEDETOMIDINE 1.4 MCG/KG/HR: 200 INJECTION, SOLUTION INTRAVENOUS at 09:03

## 2023-03-30 RX ADMIN — LORAZEPAM 2 MG: 2 INJECTION INTRAMUSCULAR; INTRAVENOUS at 10:03

## 2023-03-30 RX ADMIN — DIVALPROEX SODIUM 250 MG: 250 TABLET, DELAYED RELEASE ORAL at 01:03

## 2023-03-30 RX ADMIN — THIAMINE HCL TAB 100 MG 100 MG: 100 TAB at 08:03

## 2023-03-30 RX ADMIN — DEXMEDETOMIDINE 1.4 MCG/KG/HR: 200 INJECTION, SOLUTION INTRAVENOUS at 12:03

## 2023-03-30 RX ADMIN — DEXMEDETOMIDINE 1.4 MCG/KG/HR: 200 INJECTION, SOLUTION INTRAVENOUS at 01:03

## 2023-03-30 RX ADMIN — PANTOPRAZOLE SODIUM 40 MG: 40 INJECTION, POWDER, FOR SOLUTION INTRAVENOUS at 08:03

## 2023-03-30 RX ADMIN — DEXMEDETOMIDINE 1.4 MCG/KG/HR: 200 INJECTION, SOLUTION INTRAVENOUS at 06:03

## 2023-03-30 RX ADMIN — DEXMEDETOMIDINE 1.4 MCG/KG/HR: 200 INJECTION, SOLUTION INTRAVENOUS at 02:03

## 2023-03-30 RX ADMIN — POLYETHYLENE GLYCOL 3350 17 G: 17 POWDER, FOR SOLUTION ORAL at 08:03

## 2023-03-30 RX ADMIN — ENOXAPARIN SODIUM 40 MG: 100 INJECTION SUBCUTANEOUS at 05:03

## 2023-03-30 RX ADMIN — DEXMEDETOMIDINE 1.4 MCG/KG/HR: 200 INJECTION, SOLUTION INTRAVENOUS at 10:03

## 2023-03-30 RX ADMIN — PIPERACILLIN AND TAZOBACTAM 4.5 G: 4; .5 INJECTION, POWDER, FOR SOLUTION INTRAVENOUS; PARENTERAL at 09:03

## 2023-03-30 RX ADMIN — DIPHENHYDRAMINE HYDROCHLORIDE 50 MG: 50 INJECTION, SOLUTION INTRAMUSCULAR; INTRAVENOUS at 11:03

## 2023-03-30 RX ADMIN — POTASSIUM CHLORIDE 10 MEQ: 7.46 INJECTION, SOLUTION INTRAVENOUS at 08:03

## 2023-03-30 RX ADMIN — IBUPROFEN 800 MG: 400 TABLET, FILM COATED ORAL at 11:03

## 2023-03-30 RX ADMIN — DIVALPROEX SODIUM 250 MG: 250 TABLET, DELAYED RELEASE ORAL at 09:03

## 2023-03-30 RX ADMIN — LORAZEPAM 2 MG: 2 INJECTION INTRAMUSCULAR; INTRAVENOUS at 01:03

## 2023-03-30 RX ADMIN — DEXMEDETOMIDINE 1.4 MCG/KG/HR: 200 INJECTION, SOLUTION INTRAVENOUS at 08:03

## 2023-03-30 RX ADMIN — DEXMEDETOMIDINE 1.4 MCG/KG/HR: 200 INJECTION, SOLUTION INTRAVENOUS at 05:03

## 2023-03-30 RX ADMIN — POTASSIUM CHLORIDE 10 MEQ: 7.46 INJECTION, SOLUTION INTRAVENOUS at 12:03

## 2023-03-30 RX ADMIN — IBUPROFEN 800 MG: 400 TABLET, FILM COATED ORAL at 04:03

## 2023-03-30 RX ADMIN — PIPERACILLIN AND TAZOBACTAM 4.5 G: 4; .5 INJECTION, POWDER, FOR SOLUTION INTRAVENOUS; PARENTERAL at 06:03

## 2023-03-30 RX ADMIN — LORAZEPAM 2 MG: 2 INJECTION INTRAMUSCULAR; INTRAVENOUS at 08:03

## 2023-03-30 RX ADMIN — ASPIRIN 81 MG: 81 TABLET, DELAYED RELEASE ORAL at 08:03

## 2023-03-30 RX ADMIN — POTASSIUM CHLORIDE 10 MEQ: 7.46 INJECTION, SOLUTION INTRAVENOUS at 10:03

## 2023-03-30 RX ADMIN — LORAZEPAM 2 MG: 2 INJECTION INTRAMUSCULAR; INTRAVENOUS at 04:03

## 2023-03-30 NOTE — PROGRESS NOTES
"[facility]  Adult Nutrition  Follow Up Note    SUMMARY       Recommendations    Recommendation/Intervention: Recommend continue to advance current diet order as able/appropriate and tolerated and per ST recommendations. Recommend additionof nutritional supplement to increase kcal/pro intake to better meet nutritional needs. Encourage good PO intakes.  Goals: advance diet order, tolerate PO intake, weight maintenance  Nutrition Goal Status: progressing towards goal  Communication of RD Recs: discussed on rounds    Assessment and Plan  RD follow up. Current weight is 240 lbs, indicating ~25 lb weight loss x1 day. Unsure if weight is accurate. Continue to monitor weight trends.     Per ST:   "3/30 - Diet recommendations:   , Full liquids   Aspiration Precautions:  full liquid diet as tolerated; may advance as tolerated , 1 bite/sip at a time, HOB to 90 degrees, Remain upright 30 minutes post meal, Small bites/sips, and Standard aspiration precautions   General Precautions: Standard,    Communication strategies:   Patient still confused but was compliant during BEDSIDE SWALLOW EVALUATION."    Pt diet advanced to Full Liquid diet. Recommend addition of nutritional supplement to increase kcal/pro intake to better meet nutritional needs. Encourage good PO intakes.    Last BM 3/29 per flowsheets. Labs/meds reviewed. RD following.     Nutrition Diagnosis  Inadequate energy intake   related to patient intubated and sedated as evidenced by requiring NG tube feeds for nutrition     Nutrition Diagnosis Status: Improving  Diet advanced to full liquids     Reason for Assessment    Reason For Assessment: RD follow-up  Relevant Medical History: alcohol withdrawal, toureettes, EVELYN, depression, metabolic acidosis, AMS    Nutrition Risk Screen    Nutrition Risk Screen: no indicators present    Nutrition/Diet History    Spiritual, Cultural Beliefs, Uatsdin Practices, Values that Affect Care: no  Food Allergies: " NKFA  Factors Affecting Nutritional Intake: NPO, on mechanical ventilation    Anthropometrics    Temp: 99 °F (37.2 °C)  Height: 6' (182.9 cm)  Height (inches): 72 in  Weight Method: Bed Scale  Weight: 109.2 kg (240 lb 11.9 oz)  Weight (lb): 240.74 lb  Ideal Body Weight (IBW), Male: 178 lb  % Ideal Body Weight, Male (lb): 142.06 %  BMI (Calculated): 32.6       Lab/Procedures/Meds   Latest Reference Range & Units 03/30/23 04:53   Sodium 136 - 145 mmol/L 146 (H)   Potassium 3.5 - 5.1 mmol/L 3.0 (L)   Chloride 98 - 107 mmol/L 101   CO2 21 - 32 mmol/L 35 (H)   Anion Gap 7 - 16 mmol/L 13   BUN 7 - 18 mg/dL 36 (H)   Creatinine 0.70 - 1.30 mg/dL 2.03 (H)   BUN/CREAT RATIO 6 - 20  18   eGFR >=60 mL/min/1.73m² 40 (L)   Glucose 74 - 106 mg/dL 123 (H)   Calcium 8.5 - 10.1 mg/dL 8.8   Alkaline Phosphatase 45 - 115 U/L 84   PROTEIN TOTAL 6.4 - 8.2 g/dL 7.6   Albumin 3.5 - 5.0 g/dL 2.5 (L)   Albumin/Globulin Ratio  0.5   BILIRUBIN TOTAL >0.0 - 1.2 mg/dL 1.7 (H)   AST 15 - 37 U/L 71 (H)   ALT 16 - 61 U/L 41   (H): Data is abnormally high  (L): Data is abnormally low    Note: Elevated Na. Low K - replete to WNL as needed. Elevated BUN, Cr and low GFR - No PMH of CKD. Low albumin. Elevated bilirubin, AST.     Pertinent Labs Reviewed: reviewed  Pertinent Medications Reviewed: reviewed  Pertinent Medications Comments: aspirin, divalproex, enoxaparin, escitalopram, fetanyl, pantoprazole, zosyn, polyethylene glycol, potassium chloride, quetiapine, thiamine    Estimated/Assessed Needs    Weight Used For Calorie Calculations: 87.7 kg (193 lb 5.5 oz) (adjusted weight used)  Energy Calorie Requirements (kcal): 1242-2520  Energy Need Method: Kcal/kg  Protein Requirements:   Weight Used For Protein Calculations: 87.7 kg (193 lb 5.5 oz)        RDA Method (mL): 2192         Nutrition Prescription Ordered    Current Diet Order: Full Liquid Diet    Evaluation of Received Nutrient/Fluid Intake    % Intake of Estimated Energy Needs/ %meals:  diet order recently advanced, encourage good PO intakes.     Nutrition Risk    Level of Risk/Frequency of Follow-up: moderate - high     Monitor and Evaluation    Food and Nutrient Intake: food and beverage intake, energy intake  Food and Nutrient Adminstration: diet order  Anthropometric Measurements: weight change, weight  Biochemical Data, Medical Tests and Procedures: lipid profile, inflammatory profile, glucose/endocrine profile, gastrointestinal profile, electrolyte and renal panel  Nutrition-Focused Physical Findings: overall appearance     Nutrition Follow-Up    RD Follow-up?: Yes

## 2023-03-30 NOTE — PLAN OF CARE
Per IDT meeting ABX, confused and in restraints. K+ started this day monitoring labs with tx prn. BS swallow eval this day. Will follow dc needs as arise.

## 2023-03-30 NOTE — PT/OT/SLP EVAL
Speech Language Pathology Evaluation  Bedside Swallow    Patient Name:  Carol Ladd Jr.   MRN:  [id number]  Admitting Diagnosis: Alcohol withdrawal syndrome with complication    Recommendations:                 General Recommendations:  Follow-up not indicated  Diet recommendations:   , Full liquids   Aspiration Precautions:  full liquid diet as tolerated; may advance as tolerated , 1 bite/sip at a time, HOB to 90 degrees, Remain upright 30 minutes post meal, Small bites/sips, and Standard aspiration precautions   General Precautions: Standard,    Communication strategies:   Patient still confused but was compliant during BEDSIDE SWALLOW EVALUATION.    History:     Past Medical History:   Diagnosis Date    Hypertension     Mixed obsessional thoughts and acts 01/24/2017    Tourette syndrome     Tourette's syndrome 01/24/2017       History reviewed. No pertinent surgical history.    Social History: Patient lives in [place] (per patient). Unsure of who he lives with.    Prior Intubation HX:  self extubated yesterday    Modified Barium Swallow: n/a    Chest X-Rays: see chart    Prior diet: unknown.    Occupation/hobbies/homemaking: not stated.    Subjective     Patient lying in bed asleep but easily aroused. Patient agreeable to BEDSIDE SWALLOW EVALUATION.  Patient goals: not stated     Pain/Comfort:       Respiratory Status:  high flow; see chart    Objective:     Oral Musculature Evaluation  Oral Musculature: general weakness  Dentition: present and adequate  Secretion Management: adequate  Mucosal Quality: adequate  Oral Labial Strength and Mobility: WFL  Lingual Strength and Mobility: WFL    Bedside Swallow Eval:   Consistencies Assessed:  Thin liquids Patient was given small trials of water via a spoon and a straw. SLP controlled all trial sizes (small sips). No difficulties noted with any trials.   Puree Patient was given small trials of pudding via a spoon. No difficulties noted with any trials. No  other consistencies were tested.       Oral Phase:   WFL    Pharyngeal Phase:   no overt clinical signs/symptoms of aspiration  no overt clinical signs/symptoms of pharyngeal dysphagia    Compensatory Strategies  None    Treatment: Rec patient begin with full liquid diet and advance as tolerated. Skilled SPEECH THERAPY not indicated.     Assessment:     Carol Ladd Jr. is a 47 y.o. male with an SLP diagnosis of  s/p self extubation .  He presents with passing BEDSIDE SWALLOW EVALUATION with liquids and pudding.    Goals:   Multidisciplinary Problems       SLP Goals       Not on file                    Plan:     Patient to be seen:      Plan of Care expires:     Plan of Care reviewed with:      SLP Follow-Up:  No       Discharge recommendations:      Barriers to Discharge:  Decreased Care Giver Support    Time Tracking:     SLP Treatment Date:      Speech Start Time:  1030  Speech Stop Time:  1050     Speech Total Time (min):  20 min    Billable Minutes: Eval Swallow and Oral Function 20 03/30/2023

## 2023-03-30 NOTE — ASSESSMENT & PLAN NOTE
Fever was worse patient required more antipsychotics I do not know if that is what is causing his fever is on antibiotics I do not see any abscess at this time her skin issues

## 2023-03-30 NOTE — PROGRESS NOTES
[facility]  Pulmonology  Progress Note    Patient Name: Carol Ladd Jr.  MRN: [id number]  Admission Date: 3/21/2023  Hospital Length of Stay: 9 days  Code Status: No Order  Attending Provider: Des Navarro MD  Primary Care Provider: Raymundo Cabrera MD   Principal Problem: Alcohol withdrawal syndrome with complication    Subjective:     Interval History:  Patient resting this morning much less agitated spells where he is oriented      Objective:     Vital Signs (Most Recent):  Temp: (!) 102 °F (38.9 °C) (03/30/23 0400)  Pulse: (!) 126 (03/30/23 0330)  Resp: (!) 39 (03/30/23 0330)  BP: (!) 152/77 (03/30/23 0330)  SpO2: 98 % (03/30/23 0330)   Vital Signs (24h Range):  Temp:  [98.4 °F (36.9 °C)-102 °F (38.9 °C)] 102 °F (38.9 °C)  Pulse:  [] 126  Resp:  [12-39] 39  SpO2:  [74 %-100 %] 98 %  BP: (110-215)/() 152/77     Weight: 109.2 kg (240 lb 11.9 oz)  Body mass index is 32.65 kg/m².      Intake/Output Summary (Last 24 hours) at 3/30/2023 0548  Last data filed at 3/30/2023 0536  Gross per 24 hour   Intake 536.63 ml   Output 5801 ml   Net -5264.37 ml       Physical Exam  Vitals reviewed.   Constitutional:       Appearance: Normal appearance.      Interventions: He is not intubated.  HENT:      Head: Normocephalic and atraumatic.      Nose: Nose normal.      Mouth/Throat:      Mouth: Mucous membranes are dry.      Pharynx: Oropharynx is clear.   Eyes:      Extraocular Movements: Extraocular movements intact.      Conjunctiva/sclera: Conjunctivae normal.      Pupils: Pupils are equal, round, and reactive to light.   Cardiovascular:      Rate and Rhythm: Normal rate.      Heart sounds: Normal heart sounds. No murmur heard.  Pulmonary:      Effort: Pulmonary effort is normal. He is not intubated.      Breath sounds: Normal breath sounds.   Abdominal:      General: Abdomen is flat. Bowel sounds are normal.      Palpations: Abdomen is soft.   Musculoskeletal:         General:  Normal range of motion.      Cervical back: Normal range of motion and neck supple.      Right lower leg: No edema.      Left lower leg: No edema.   Skin:     General: Skin is warm and dry.      Capillary Refill: Capillary refill takes less than 2 seconds.   Neurological:      General: No focal deficit present.      Mental Status: He is alert and oriented to person, place, and time.   Psychiatric:         Mood and Affect: Mood normal.         Behavior: Behavior normal.     Review of Systems    Vents:  Vent Mode: CPAP / PSV (03/28/23 1142)  Ventilator Initiated: Yes (03/23/23 0000)  Set Rate: 12 BPM (03/28/23 0428)  Vt Set: 500 mL (03/28/23 0428)  Pressure Support: 8 cmH20 (03/28/23 1142)  PEEP/CPAP: 5 cmH20 (03/28/23 1142)  Oxygen Concentration (%): 79 (03/30/23 0301)  Peak Airway Pressure: 14 cmH20 (03/28/23 1142)  Total Ve: 5.5 L/m (03/28/23 1142)  F/VT Ratio<105 (RSBI): (!) 27.66 (03/28/23 0015)    Lines/Drains/Airways       Drain  Duration                  Urethral Catheter 03/23/23 1000 Non-latex 16 Fr. 6 days              Peripheral Intravenous Line  Duration                  Peripheral IV - Single Lumen 03/25/23 1929 20 G Anterior;Left;Proximal Forearm 4 days         Peripheral IV - Single Lumen 03/27/23 1730 20 G Anterior;Distal;Left Forearm 2 days         Peripheral IV - Single Lumen 03/27/23 2141 20 G Right Antecubital 2 days                    Significant Labs:    CBC/Anemia Profile:  Recent Labs   Lab 03/29/23 0437   WBC 9.35   HGB 8.2*   HCT 25.6*   *   .8*   RDW 16.5*        Chemistries:  Recent Labs   Lab 03/29/23 0437   *   K 3.5   CL 98   CO2 35*   BUN 24*   CREATININE 1.70*   CALCIUM 7.7*   ALBUMIN 2.6*   PROT 6.2*   BILITOT 2.2*   ALKPHOS 107   ALT 47   AST 93*       Recent Lab Results       None            Significant Imaging:  I have reviewed all pertinent imaging results/findings within the past 24 hours.    Assessment/Plan:     Neuro  Tourette syndrome  Not much of an  issue now but lots of psychiatric issues    Psychiatric  * Alcohol withdrawal syndrome with complication  Probably not alcohol withdrawal this point but continues to have difficulty with mentation      Depression  Prozac and Doxepin on home medication list but states he was not taking these..   Was also started on those medications along  with tranxene, trazodone and Zyprexa at [place] over the last 2 days  -- Patient is likely starting to have withdraws from alcohol and subaxone; however, the combination of prozac and doxepin and trazadone all increase the risk of serotonin syndrome .. given this concern serotonin syndrome should be in the differential as well     Will watch          Pulmonary  Acute respiratory failure with hypoxia and hypercarbia  Patient doing reasonably well this morning still requiring high-flow will repeat chest x-ray I think is his agitation gets better that will improve    Cardiac/Vascular  HTN (hypertension)  Seems a little bit better today    Renal/  Acute kidney injury  Good urine output creatinine was down yesterday      Orthopedic  Rhabdomyolysis  She became remains mildly elevated will watch    Other  Fever  Fever was worse patient required more antipsychotics I do not know if that is what is causing his fever is on antibiotics I do not see any abscess at this time her skin issues                   Des Navarro MD  Pulmonology  [facility]

## 2023-03-30 NOTE — SUBJECTIVE & OBJECTIVE
Interval History:  Patient resting this morning much less agitated spells where he is oriented      Objective:     Vital Signs (Most Recent):  Temp: (!) 102 °F (38.9 °C) (03/30/23 0400)  Pulse: (!) 126 (03/30/23 0330)  Resp: (!) 39 (03/30/23 0330)  BP: (!) 152/77 (03/30/23 0330)  SpO2: 98 % (03/30/23 0330)   Vital Signs (24h Range):  Temp:  [98.4 °F (36.9 °C)-102 °F (38.9 °C)] 102 °F (38.9 °C)  Pulse:  [] 126  Resp:  [12-39] 39  SpO2:  [74 %-100 %] 98 %  BP: (110-215)/() 152/77     Weight: 109.2 kg (240 lb 11.9 oz)  Body mass index is 32.65 kg/m².      Intake/Output Summary (Last 24 hours) at 3/30/2023 0548  Last data filed at 3/30/2023 0536  Gross per 24 hour   Intake 536.63 ml   Output 5801 ml   Net -5264.37 ml       Physical Exam  Vitals reviewed.   Constitutional:       Appearance: Normal appearance.      Interventions: He is not intubated.  HENT:      Head: Normocephalic and atraumatic.      Nose: Nose normal.      Mouth/Throat:      Mouth: Mucous membranes are dry.      Pharynx: Oropharynx is clear.   Eyes:      Extraocular Movements: Extraocular movements intact.      Conjunctiva/sclera: Conjunctivae normal.      Pupils: Pupils are equal, round, and reactive to light.   Cardiovascular:      Rate and Rhythm: Normal rate.      Heart sounds: Normal heart sounds. No murmur heard.  Pulmonary:      Effort: Pulmonary effort is normal. He is not intubated.      Breath sounds: Normal breath sounds.   Abdominal:      General: Abdomen is flat. Bowel sounds are normal.      Palpations: Abdomen is soft.   Musculoskeletal:         General: Normal range of motion.      Cervical back: Normal range of motion and neck supple.      Right lower leg: No edema.      Left lower leg: No edema.   Skin:     General: Skin is warm and dry.      Capillary Refill: Capillary refill takes less than 2 seconds.   Neurological:      General: No focal deficit present.      Mental Status: He is alert and oriented to person, place,  and time.   Psychiatric:         Mood and Affect: Mood normal.         Behavior: Behavior normal.     Review of Systems    Vents:  Vent Mode: CPAP / PSV (03/28/23 1142)  Ventilator Initiated: Yes (03/23/23 0000)  Set Rate: 12 BPM (03/28/23 0428)  Vt Set: 500 mL (03/28/23 0428)  Pressure Support: 8 cmH20 (03/28/23 1142)  PEEP/CPAP: 5 cmH20 (03/28/23 1142)  Oxygen Concentration (%): 79 (03/30/23 0301)  Peak Airway Pressure: 14 cmH20 (03/28/23 1142)  Total Ve: 5.5 L/m (03/28/23 1142)  F/VT Ratio<105 (RSBI): (!) 27.66 (03/28/23 0015)    Lines/Drains/Airways       Drain  Duration                  Urethral Catheter 03/23/23 1000 Non-latex 16 Fr. 6 days              Peripheral Intravenous Line  Duration                  Peripheral IV - Single Lumen 03/25/23 1929 20 G Anterior;Left;Proximal Forearm 4 days         Peripheral IV - Single Lumen 03/27/23 1730 20 G Anterior;Distal;Left Forearm 2 days         Peripheral IV - Single Lumen 03/27/23 2141 20 G Right Antecubital 2 days                    Significant Labs:    CBC/Anemia Profile:  Recent Labs   Lab 03/29/23 0437   WBC 9.35   HGB 8.2*   HCT 25.6*   *   .8*   RDW 16.5*        Chemistries:  Recent Labs   Lab 03/29/23 0437   *   K 3.5   CL 98   CO2 35*   BUN 24*   CREATININE 1.70*   CALCIUM 7.7*   ALBUMIN 2.6*   PROT 6.2*   BILITOT 2.2*   ALKPHOS 107   ALT 47   AST 93*       Recent Lab Results       None            Significant Imaging:  I have reviewed all pertinent imaging results/findings within the past 24 hours.

## 2023-03-30 NOTE — ASSESSMENT & PLAN NOTE
Patient doing reasonably well this morning still requiring high-flow will repeat chest x-ray I think is his agitation gets better that will improve

## 2023-03-30 NOTE — ASSESSMENT & PLAN NOTE
Prozac and Doxepin on home medication list but states he was not taking these..   Was also started on those medications along  with tranxene, trazodone and Zyprexa at [place] over the last 2 days  -- Patient is likely starting to have withdraws from alcohol and subaxone; however, the combination of prozac and doxepin and trazadone all increase the risk of serotonin syndrome .. given this concern serotonin syndrome should be in the differential as well     Will watch

## 2023-03-30 NOTE — PLAN OF CARE
Problem: Infection  Goal: Absence of Infection Signs and Symptoms  Outcome: Ongoing, Progressing     Problem: Adult Inpatient Plan of Care  Goal: Readiness for Transition of Care  Outcome: Ongoing, Progressing     Problem: Fluid and Electrolyte Imbalance (Acute Kidney Injury/Impairment)  Goal: Fluid and Electrolyte Balance  Outcome: Ongoing, Progressing     Problem: Oral Intake Inadequate (Acute Kidney Injury/Impairment)  Goal: Optimal Nutrition Intake  Outcome: Ongoing, Progressing     Problem: Renal Function Impairment (Acute Kidney Injury/Impairment)  Goal: Effective Renal Function  Outcome: Ongoing, Progressing     Problem: Skin Injury Risk Increased  Goal: Skin Health and Integrity  Outcome: Ongoing, Progressing     Problem: Fall Injury Risk  Goal: Absence of Fall and Fall-Related Injury  Outcome: Ongoing, Progressing     Problem: Restraint, Nonbehavioral (Nonviolent)  Goal: Absence of Harm or Injury  Outcome: Ongoing, Progressing     Problem: Impaired Wound Healing  Goal: Optimal Wound Healing  Outcome: Ongoing, Progressing

## 2023-03-30 NOTE — PLAN OF CARE
Problem: Infection  Goal: Absence of Infection Signs and Symptoms  Outcome: Ongoing, Progressing     Problem: Adult Inpatient Plan of Care  Goal: Plan of Care Review  Outcome: Ongoing, Progressing  Goal: Patient-Specific Goal (Individualized)  Outcome: Ongoing, Progressing  Goal: Absence of Hospital-Acquired Illness or Injury  Outcome: Ongoing, Progressing  Goal: Optimal Comfort and Wellbeing  Outcome: Ongoing, Progressing  Goal: Readiness for Transition of Care  Outcome: Ongoing, Progressing     Problem: Fluid and Electrolyte Imbalance (Acute Kidney Injury/Impairment)  Goal: Fluid and Electrolyte Balance  Outcome: Ongoing, Progressing     Problem: Oral Intake Inadequate (Acute Kidney Injury/Impairment)  Goal: Optimal Nutrition Intake  Outcome: Ongoing, Progressing     Problem: Renal Function Impairment (Acute Kidney Injury/Impairment)  Goal: Effective Renal Function  Outcome: Ongoing, Progressing     Problem: Skin Injury Risk Increased  Goal: Skin Health and Integrity  Outcome: Ongoing, Progressing     Problem: Fall Injury Risk  Goal: Absence of Fall and Fall-Related Injury  Outcome: Ongoing, Progressing     Problem: Restraint, Nonbehavioral (Nonviolent)  Goal: Absence of Harm or Injury  Outcome: Ongoing, Progressing     Problem: Impaired Wound Healing  Goal: Optimal Wound Healing  Outcome: Ongoing, Progressing     Problem: Breathing Pattern Ineffective  Goal: Effective Breathing Pattern  Outcome: Ongoing, Progressing     Problem: Gas Exchange Impaired  Goal: Optimal Gas Exchange  Outcome: Ongoing, Progressing

## 2023-03-31 LAB
ALBUMIN SERPL BCP-MCNC: 2.4 G/DL (ref 3.5–5)
ALBUMIN/GLOB SERPL: 0.6 {RATIO}
ALP SERPL-CCNC: 96 U/L (ref 45–115)
ALT SERPL W P-5'-P-CCNC: 43 U/L (ref 16–61)
ANION GAP SERPL CALCULATED.3IONS-SCNC: 11 MMOL/L (ref 7–16)
ANISOCYTOSIS BLD QL SMEAR: ABNORMAL
AST SERPL W P-5'-P-CCNC: 71 U/L (ref 15–37)
BASOPHILS # BLD AUTO: 0.12 K/UL (ref 0–0.2)
BASOPHILS NFR BLD AUTO: 1 % (ref 0–1)
BILIRUB SERPL-MCNC: 1.4 MG/DL (ref ?–1.2)
BUN SERPL-MCNC: 44 MG/DL (ref 7–18)
BUN/CREAT SERPL: 24 (ref 6–20)
CALCIUM SERPL-MCNC: 8.5 MG/DL (ref 8.5–10.1)
CHLORIDE SERPL-SCNC: 97 MMOL/L (ref 98–107)
CK SERPL-CCNC: 738 U/L (ref 39–308)
CO2 SERPL-SCNC: 32 MMOL/L (ref 21–32)
CREAT SERPL-MCNC: 1.83 MG/DL (ref 0.7–1.3)
DIFFERENTIAL METHOD BLD: ABNORMAL
EGFR (NO RACE VARIABLE) (RUSH/TITUS): 46 ML/MIN/1.73M²
EOSINOPHIL # BLD AUTO: 0.15 K/UL (ref 0–0.5)
EOSINOPHIL NFR BLD AUTO: 1.2 % (ref 1–4)
ERYTHROCYTE [DISTWIDTH] IN BLOOD BY AUTOMATED COUNT: 16.5 % (ref 11.5–14.5)
GLOBULIN SER-MCNC: 3.8 G/DL (ref 2–4)
GLUCOSE SERPL-MCNC: 108 MG/DL (ref 74–106)
HCT VFR BLD AUTO: 25.2 % (ref 40–54)
HGB BLD-MCNC: 8 G/DL (ref 13.5–18)
IMM GRANULOCYTES # BLD AUTO: 0.21 K/UL (ref 0–0.04)
IMM GRANULOCYTES NFR BLD: 1.7 % (ref 0–0.4)
LYMPHOCYTES # BLD AUTO: 1.49 K/UL (ref 1–4.8)
LYMPHOCYTES NFR BLD AUTO: 12.1 % (ref 27–41)
MACROCYTES BLD QL SMEAR: ABNORMAL
MCH RBC QN AUTO: 33.9 PG (ref 27–31)
MCHC RBC AUTO-ENTMCNC: 31.7 G/DL (ref 32–36)
MCV RBC AUTO: 106.8 FL (ref 80–96)
MONOCYTES # BLD AUTO: 1.26 K/UL (ref 0–0.8)
MONOCYTES NFR BLD AUTO: 10.3 % (ref 2–6)
MPC BLD CALC-MCNC: 9.7 FL (ref 9.4–12.4)
NEUTROPHILS # BLD AUTO: 9.05 K/UL (ref 1.8–7.7)
NEUTROPHILS NFR BLD AUTO: 73.7 % (ref 53–65)
NRBC # BLD AUTO: 0.03 X10E3/UL
NRBC, AUTO (.00): 0.2 %
PLATELET # BLD AUTO: 632 K/UL (ref 150–400)
PLATELET MORPHOLOGY: ABNORMAL
POLYCHROMASIA BLD QL SMEAR: ABNORMAL
POTASSIUM SERPL-SCNC: 3.4 MMOL/L (ref 3.5–5.1)
PROT SERPL-MCNC: 6.2 G/DL (ref 6.4–8.2)
RBC # BLD AUTO: 2.36 M/UL (ref 4.6–6.2)
SODIUM SERPL-SCNC: 137 MMOL/L (ref 136–145)
WBC # BLD AUTO: 12.28 K/UL (ref 4.5–11)

## 2023-03-31 PROCEDURE — 25000003 PHARM REV CODE 250: Performed by: INTERNAL MEDICINE

## 2023-03-31 PROCEDURE — 63600175 PHARM REV CODE 636 W HCPCS: Performed by: HOSPITALIST

## 2023-03-31 PROCEDURE — 20000000 HC ICU ROOM

## 2023-03-31 PROCEDURE — 99291 CRITICAL CARE FIRST HOUR: CPT | Mod: ,,, | Performed by: INTERNAL MEDICINE

## 2023-03-31 PROCEDURE — 25000003 PHARM REV CODE 250: Performed by: NURSE PRACTITIONER

## 2023-03-31 PROCEDURE — 85025 COMPLETE CBC W/AUTO DIFF WBC: CPT | Performed by: NURSE PRACTITIONER

## 2023-03-31 PROCEDURE — 25000003 PHARM REV CODE 250: Performed by: HOSPITALIST

## 2023-03-31 PROCEDURE — 80053 COMPREHEN METABOLIC PANEL: CPT | Performed by: NURSE PRACTITIONER

## 2023-03-31 PROCEDURE — 27000221 HC OXYGEN, UP TO 24 HOURS

## 2023-03-31 PROCEDURE — 99900035 HC TECH TIME PER 15 MIN (STAT)

## 2023-03-31 PROCEDURE — 63600175 PHARM REV CODE 636 W HCPCS: Performed by: INTERNAL MEDICINE

## 2023-03-31 PROCEDURE — 94761 N-INVAS EAR/PLS OXIMETRY MLT: CPT

## 2023-03-31 PROCEDURE — C9113 INJ PANTOPRAZOLE SODIUM, VIA: HCPCS | Performed by: NURSE PRACTITIONER

## 2023-03-31 PROCEDURE — 99291 PR CRITICAL CARE, E/M 30-74 MINUTES: ICD-10-PCS | Mod: ,,, | Performed by: INTERNAL MEDICINE

## 2023-03-31 PROCEDURE — 82550 ASSAY OF CK (CPK): CPT | Performed by: INTERNAL MEDICINE

## 2023-03-31 PROCEDURE — 63600175 PHARM REV CODE 636 W HCPCS: Performed by: NURSE PRACTITIONER

## 2023-03-31 RX ORDER — FUROSEMIDE 10 MG/ML
80 INJECTION INTRAMUSCULAR; INTRAVENOUS ONCE
Status: COMPLETED | OUTPATIENT
Start: 2023-03-31 | End: 2023-03-31

## 2023-03-31 RX ORDER — CLORAZEPATE DIPOTASSIUM 3.75 MG/1
3.75 TABLET ORAL 3 TIMES DAILY
Status: DISCONTINUED | OUTPATIENT
Start: 2023-03-31 | End: 2023-04-02

## 2023-03-31 RX ADMIN — CLORAZEPATE DIPOTASSIUM 3.75 MG: 3.75 TABLET ORAL at 09:03

## 2023-03-31 RX ADMIN — CLORAZEPATE DIPOTASSIUM 3.75 MG: 3.75 TABLET ORAL at 02:03

## 2023-03-31 RX ADMIN — ENOXAPARIN SODIUM 40 MG: 100 INJECTION SUBCUTANEOUS at 05:03

## 2023-03-31 RX ADMIN — POLYETHYLENE GLYCOL 3350 17 G: 17 POWDER, FOR SOLUTION ORAL at 08:03

## 2023-03-31 RX ADMIN — CLORAZEPATE DIPOTASSIUM 3.75 MG: 3.75 TABLET ORAL at 08:03

## 2023-03-31 RX ADMIN — DIVALPROEX SODIUM 250 MG: 250 TABLET, DELAYED RELEASE ORAL at 09:03

## 2023-03-31 RX ADMIN — DEXMEDETOMIDINE 1 MCG/KG/HR: 200 INJECTION, SOLUTION INTRAVENOUS at 06:03

## 2023-03-31 RX ADMIN — DIPHENHYDRAMINE HYDROCHLORIDE 50 MG: 50 INJECTION, SOLUTION INTRAMUSCULAR; INTRAVENOUS at 07:03

## 2023-03-31 RX ADMIN — DIVALPROEX SODIUM 250 MG: 250 TABLET, DELAYED RELEASE ORAL at 02:03

## 2023-03-31 RX ADMIN — LORAZEPAM 2 MG: 2 INJECTION INTRAMUSCULAR; INTRAVENOUS at 11:03

## 2023-03-31 RX ADMIN — PIPERACILLIN AND TAZOBACTAM 4.5 G: 4; .5 INJECTION, POWDER, FOR SOLUTION INTRAVENOUS; PARENTERAL at 02:03

## 2023-03-31 RX ADMIN — ACETAMINOPHEN 1000 MG: 500 TABLET ORAL at 07:03

## 2023-03-31 RX ADMIN — DIPHENHYDRAMINE HYDROCHLORIDE 50 MG: 50 INJECTION, SOLUTION INTRAMUSCULAR; INTRAVENOUS at 05:03

## 2023-03-31 RX ADMIN — DIPHENHYDRAMINE HYDROCHLORIDE 50 MG: 50 INJECTION, SOLUTION INTRAMUSCULAR; INTRAVENOUS at 01:03

## 2023-03-31 RX ADMIN — IBUPROFEN 800 MG: 400 TABLET, FILM COATED ORAL at 01:03

## 2023-03-31 RX ADMIN — IBUPROFEN 800 MG: 400 TABLET, FILM COATED ORAL at 02:03

## 2023-03-31 RX ADMIN — LORAZEPAM 2 MG: 2 INJECTION INTRAMUSCULAR; INTRAVENOUS at 08:03

## 2023-03-31 RX ADMIN — DIVALPROEX SODIUM 250 MG: 250 TABLET, DELAYED RELEASE ORAL at 06:03

## 2023-03-31 RX ADMIN — ESCITALOPRAM OXALATE 10 MG: 10 TABLET ORAL at 08:03

## 2023-03-31 RX ADMIN — DEXMEDETOMIDINE 1.4 MCG/KG/HR: 200 INJECTION, SOLUTION INTRAVENOUS at 04:03

## 2023-03-31 RX ADMIN — PANTOPRAZOLE SODIUM 40 MG: 40 INJECTION, POWDER, FOR SOLUTION INTRAVENOUS at 08:03

## 2023-03-31 RX ADMIN — ASPIRIN 81 MG: 81 TABLET, DELAYED RELEASE ORAL at 08:03

## 2023-03-31 RX ADMIN — FOLIC ACID 1 MG: 1 TABLET ORAL at 08:03

## 2023-03-31 RX ADMIN — PIPERACILLIN AND TAZOBACTAM 4.5 G: 4; .5 INJECTION, POWDER, FOR SOLUTION INTRAVENOUS; PARENTERAL at 09:03

## 2023-03-31 RX ADMIN — FUROSEMIDE 80 MG: 10 INJECTION, SOLUTION INTRAMUSCULAR; INTRAVENOUS at 06:03

## 2023-03-31 RX ADMIN — LORAZEPAM 2 MG: 2 INJECTION INTRAMUSCULAR; INTRAVENOUS at 01:03

## 2023-03-31 RX ADMIN — DEXMEDETOMIDINE 1.4 MCG/KG/HR: 200 INJECTION, SOLUTION INTRAVENOUS at 01:03

## 2023-03-31 RX ADMIN — PIPERACILLIN AND TAZOBACTAM 4.5 G: 4; .5 INJECTION, POWDER, FOR SOLUTION INTRAVENOUS; PARENTERAL at 06:03

## 2023-03-31 RX ADMIN — THIAMINE HCL TAB 100 MG 100 MG: 100 TAB at 08:03

## 2023-03-31 NOTE — NURSING
0845: am meds given including tranxene 3.75 that was ordered this am. Pt asking for either benadryl or ativan. Told him it wasn't time for benadryl and that we need to see if the tranxene will help before we give the ativan. He verbalized understanding.  0930: weaning precedex. Pt resting quietly. No s/s of sweating, twitching, etc.  1015 continue to wean precedex. Pt resting quietly, no s/s of sweating or twitching. Will monitor  1200 resting quietly  1300 pt had large bowel movement in bed and then assisted to [facility] and had more. Pt cleaned up and assisted back to bed.  Pt states he is feeling anxious and requested benadryl.  1305: benadryl 50 mg ivp given.  1332: pt c/o left knee pain. Rates 6/10. Motrin 800 mg po given.   1410 pt states pain is a little better, still rates at a 2.  1600 resting quietly at present.

## 2023-03-31 NOTE — PROGRESS NOTES
[facility]  Pulmonology  Progress Note    Patient Name: Carol Ladd Jr.  MRN: [id number]  Admission Date: 3/21/2023  Hospital Length of Stay: 10 days  Code Status: No Order  Attending Provider: Des Navarro MD  Primary Care Provider: Raymundo Cabrera MD   Principal Problem: Alcohol withdrawal syndrome with complication    Subjective:     Interval History:  Patient without complaints      Objective:     Vital Signs (Most Recent):  Temp: 99.9 °F (37.7 °C) (03/31/23 0115)  Pulse: 97 (03/31/23 0115)  Resp: (!) 27 (03/31/23 0115)  BP: 118/71 (03/31/23 0100)  SpO2: (!) 93 % (03/31/23 0115)   Vital Signs (24h Range):  Temp:  [97.5 °F (36.4 °C)-101.3 °F (38.5 °C)] 99.9 °F (37.7 °C)  Pulse:  [] 97  Resp:  [14-27] 27  SpO2:  [88 %-100 %] 93 %  BP: ()/(47-72) 118/71     Weight: 109.2 kg (240 lb 11.9 oz)  Body mass index is 32.65 kg/m².      Intake/Output Summary (Last 24 hours) at 3/31/2023 0548  Last data filed at 3/30/2023 2300  Gross per 24 hour   Intake 2144.78 ml   Output 1010 ml   Net 1134.78 ml       Physical Exam  Vitals reviewed.   Constitutional:       Appearance: Normal appearance.      Interventions: He is not intubated.  HENT:      Head: Normocephalic and atraumatic.      Nose: Nose normal.      Mouth/Throat:      Mouth: Mucous membranes are dry.      Pharynx: Oropharynx is clear.   Eyes:      Extraocular Movements: Extraocular movements intact.      Conjunctiva/sclera: Conjunctivae normal.      Pupils: Pupils are equal, round, and reactive to light.   Cardiovascular:      Rate and Rhythm: Normal rate.      Heart sounds: Normal heart sounds. No murmur heard.  Pulmonary:      Effort: Pulmonary effort is normal. He is not intubated.      Breath sounds: Normal breath sounds.   Abdominal:      General: Abdomen is flat. Bowel sounds are normal.      Palpations: Abdomen is soft.   Musculoskeletal:         General: Normal range of motion.      Cervical back: Normal range of  motion and neck supple.      Right lower leg: No edema.      Left lower leg: No edema.   Skin:     General: Skin is warm and dry.      Capillary Refill: Capillary refill takes less than 2 seconds.   Neurological:      General: No focal deficit present.      Mental Status: He is alert and oriented to person, place, and time.   Psychiatric:         Mood and Affect: Mood normal.         Behavior: Behavior normal.     Review of Systems    Vents:  Vent Mode: CPAP / PSV (03/28/23 1142)  Ventilator Initiated: Yes (03/23/23 0000)  Set Rate: 12 BPM (03/28/23 0428)  Vt Set: 500 mL (03/28/23 0428)  Pressure Support: 8 cmH20 (03/28/23 1142)  PEEP/CPAP: 5 cmH20 (03/28/23 1142)  Oxygen Concentration (%): 60 (03/31/23 0344)  Peak Airway Pressure: 14 cmH20 (03/28/23 1142)  Total Ve: 5.5 L/m (03/28/23 1142)  F/VT Ratio<105 (RSBI): (!) 27.66 (03/28/23 0015)    Lines/Drains/Airways       Drain  Duration                  Urethral Catheter 03/23/23 1000 Non-latex 16 Fr. 7 days              Peripheral Intravenous Line  Duration                  Peripheral IV - Single Lumen 03/25/23 1929 20 G Anterior;Left;Proximal Forearm 5 days         Peripheral IV - Single Lumen 03/27/23 1730 20 G Anterior;Distal;Left Forearm 3 days                    Significant Labs:    CBC/Anemia Profile:  Recent Labs   Lab 03/30/23  0453   WBC 12.28*   HGB 8.9*   HCT 26.8*   *   .5*   RDW 16.8*        Chemistries:  Recent Labs   Lab 03/30/23  0453   *   K 3.0*      CO2 35*   BUN 36*   CREATININE 2.03*   CALCIUM 8.8   ALBUMIN 2.5*   PROT 7.6   BILITOT 1.7*   ALKPHOS 84   ALT 41   AST 71*       Recent Lab Results       None            Significant Imaging:  I have reviewed all pertinent imaging results/findings within the past 24 hours.    Assessment/Plan:     Neuro  Encephalopathy, toxic  He seems to be a little bit better this morning we are going to wean off the Precedex add routine benzodiazepines he is on valproic acid and serotonin  reuptake inhibitor.  No metabolic reason at this time    Tourette syndrome  Little more prominent this morning    Psychiatric  * Alcohol withdrawal syndrome with complication  Has to be out from this now but may be withdrawing from other medicines      Depression  Prozac and Doxepin on home medication list but states he was not taking these..   Was also started on those medications along  with tranxene, trazodone and Zyprexa at [place] over the last 2 days  -- Patient is likely starting to have withdraws from alcohol and subaxone; however, the combination of prozac and doxepin and trazadone all increase the risk of serotonin syndrome .. given this concern serotonin syndrome should be in the differential as well     Started serotonin reuptake inhibitor         Pulmonary  Acute respiratory failure with hypoxia and hypercarbia  Really believe his hypoxemia is related to volume were going to diurese him today watch him carefully    Cardiac/Vascular  HTN (hypertension)  Seems a little bit better today    Renal/  Acute kidney injury  Creatinine running around 2 I am going to diurese him today      Orthopedic  Rhabdomyolysis  Mildly elevated CPK do not believe active rhabdo    Other  Fever  Ongoing issue, no positive cultures but some changes on chest x-ray finish out the course of Zosyn I think this is a med related fever      spent 32 minutes on critical care time             Des Navarro MD  Pulmonology  [facility]

## 2023-03-31 NOTE — ASSESSMENT & PLAN NOTE
Prozac and Doxepin on home medication list but states he was not taking these..   Was also started on those medications along  with tranxene, trazodone and Zyprexa at [place] over the last 2 days  -- Patient is likely starting to have withdraws from alcohol and subaxone; however, the combination of prozac and doxepin and trazadone all increase the risk of serotonin syndrome .. given this concern serotonin syndrome should be in the differential as well     Started serotonin reuptake inhibitor

## 2023-03-31 NOTE — ASSESSMENT & PLAN NOTE
Ongoing issue, no positive cultures but some changes on chest x-ray finish out the course of Zosyn I think this is a med related fever

## 2023-03-31 NOTE — PLAN OF CARE
Problem: Infection  Goal: Absence of Infection Signs and Symptoms  Outcome: Ongoing, Progressing     Problem: Adult Inpatient Plan of Care  Goal: Plan of Care Review  Outcome: Ongoing, Progressing  Goal: Patient-Specific Goal (Individualized)  Outcome: Ongoing, Progressing  Goal: Absence of Hospital-Acquired Illness or Injury  Outcome: Ongoing, Progressing  Goal: Optimal Comfort and Wellbeing  Outcome: Ongoing, Progressing  Goal: Readiness for Transition of Care  Outcome: Ongoing, Progressing     Problem: Fluid and Electrolyte Imbalance (Acute Kidney Injury/Impairment)  Goal: Fluid and Electrolyte Balance  Outcome: Ongoing, Progressing     Problem: Renal Function Impairment (Acute Kidney Injury/Impairment)  Goal: Effective Renal Function  Outcome: Ongoing, Progressing     Problem: Skin Injury Risk Increased  Goal: Skin Health and Integrity  Outcome: Ongoing, Progressing     Problem: Fall Injury Risk  Goal: Absence of Fall and Fall-Related Injury  Outcome: Ongoing, Progressing     Problem: Impaired Wound Healing  Goal: Optimal Wound Healing  Outcome: Ongoing, Progressing     Problem: Breathing Pattern Ineffective  Goal: Effective Breathing Pattern  Outcome: Ongoing, Progressing     Problem: Gas Exchange Impaired  Goal: Optimal Gas Exchange  Outcome: Ongoing, Progressing

## 2023-03-31 NOTE — ASSESSMENT & PLAN NOTE
He seems to be a little bit better this morning we are going to wean off the Precedex add routine benzodiazepines he is on valproic acid and serotonin reuptake inhibitor.  No metabolic reason at this time

## 2023-03-31 NOTE — ASSESSMENT & PLAN NOTE
Really believe his hypoxemia is related to volume were going to diurese him today watch him carefully

## 2023-03-31 NOTE — SUBJECTIVE & OBJECTIVE
Interval History:  Patient without complaints      Objective:     Vital Signs (Most Recent):  Temp: 99.9 °F (37.7 °C) (03/31/23 0115)  Pulse: 97 (03/31/23 0115)  Resp: (!) 27 (03/31/23 0115)  BP: 118/71 (03/31/23 0100)  SpO2: (!) 93 % (03/31/23 0115)   Vital Signs (24h Range):  Temp:  [97.5 °F (36.4 °C)-101.3 °F (38.5 °C)] 99.9 °F (37.7 °C)  Pulse:  [] 97  Resp:  [14-27] 27  SpO2:  [88 %-100 %] 93 %  BP: ()/(47-72) 118/71     Weight: 109.2 kg (240 lb 11.9 oz)  Body mass index is 32.65 kg/m².      Intake/Output Summary (Last 24 hours) at 3/31/2023 0548  Last data filed at 3/30/2023 2300  Gross per 24 hour   Intake 2144.78 ml   Output 1010 ml   Net 1134.78 ml       Physical Exam  Vitals reviewed.   Constitutional:       Appearance: Normal appearance.      Interventions: He is not intubated.  HENT:      Head: Normocephalic and atraumatic.      Nose: Nose normal.      Mouth/Throat:      Mouth: Mucous membranes are dry.      Pharynx: Oropharynx is clear.   Eyes:      Extraocular Movements: Extraocular movements intact.      Conjunctiva/sclera: Conjunctivae normal.      Pupils: Pupils are equal, round, and reactive to light.   Cardiovascular:      Rate and Rhythm: Normal rate.      Heart sounds: Normal heart sounds. No murmur heard.  Pulmonary:      Effort: Pulmonary effort is normal. He is not intubated.      Breath sounds: Normal breath sounds.   Abdominal:      General: Abdomen is flat. Bowel sounds are normal.      Palpations: Abdomen is soft.   Musculoskeletal:         General: Normal range of motion.      Cervical back: Normal range of motion and neck supple.      Right lower leg: No edema.      Left lower leg: No edema.   Skin:     General: Skin is warm and dry.      Capillary Refill: Capillary refill takes less than 2 seconds.   Neurological:      General: No focal deficit present.      Mental Status: He is alert and oriented to person, place, and time.   Psychiatric:         Mood and Affect: Mood  normal.         Behavior: Behavior normal.     Review of Systems    Vents:  Vent Mode: CPAP / PSV (03/28/23 1142)  Ventilator Initiated: Yes (03/23/23 0000)  Set Rate: 12 BPM (03/28/23 0428)  Vt Set: 500 mL (03/28/23 0428)  Pressure Support: 8 cmH20 (03/28/23 1142)  PEEP/CPAP: 5 cmH20 (03/28/23 1142)  Oxygen Concentration (%): 60 (03/31/23 0344)  Peak Airway Pressure: 14 cmH20 (03/28/23 1142)  Total Ve: 5.5 L/m (03/28/23 1142)  F/VT Ratio<105 (RSBI): (!) 27.66 (03/28/23 0015)    Lines/Drains/Airways       Drain  Duration                  Urethral Catheter 03/23/23 1000 Non-latex 16 Fr. 7 days              Peripheral Intravenous Line  Duration                  Peripheral IV - Single Lumen 03/25/23 1929 20 G Anterior;Left;Proximal Forearm 5 days         Peripheral IV - Single Lumen 03/27/23 1730 20 G Anterior;Distal;Left Forearm 3 days                    Significant Labs:    CBC/Anemia Profile:  Recent Labs   Lab 03/30/23  0453   WBC 12.28*   HGB 8.9*   HCT 26.8*   *   .5*   RDW 16.8*        Chemistries:  Recent Labs   Lab 03/30/23  0453   *   K 3.0*      CO2 35*   BUN 36*   CREATININE 2.03*   CALCIUM 8.8   ALBUMIN 2.5*   PROT 7.6   BILITOT 1.7*   ALKPHOS 84   ALT 41   AST 71*       Recent Lab Results       None            Significant Imaging:  I have reviewed all pertinent imaging results/findings within the past 24 hours.

## 2023-03-31 NOTE — PLAN OF CARE
Problem: Restraint, Nonbehavioral (Nonviolent)  Goal: Absence of Harm or Injury  3/31/2023 0752 by Arlet Castro, RN  Outcome: Met  3/31/2023 0751 by Arlet Castro, RN  Outcome: Ongoing, Progressing

## 2023-04-01 LAB
ALBUMIN SERPL BCP-MCNC: 2.3 G/DL (ref 3.5–5)
ALBUMIN/GLOB SERPL: 0.6 {RATIO}
ALP SERPL-CCNC: 140 U/L (ref 45–115)
ALT SERPL W P-5'-P-CCNC: 72 U/L (ref 16–61)
ANION GAP SERPL CALCULATED.3IONS-SCNC: 12 MMOL/L (ref 7–16)
ANISOCYTOSIS BLD QL SMEAR: ABNORMAL
AST SERPL W P-5'-P-CCNC: 90 U/L (ref 15–37)
BACTERIA BLD CULT: NORMAL
BACTERIA BLD CULT: NORMAL
BASOPHILS # BLD AUTO: 0.13 K/UL (ref 0–0.2)
BASOPHILS NFR BLD AUTO: 1.2 % (ref 0–1)
BILIRUB SERPL-MCNC: 1.3 MG/DL (ref ?–1.2)
BUN SERPL-MCNC: 35 MG/DL (ref 7–18)
BUN/CREAT SERPL: 21 (ref 6–20)
CALCIUM SERPL-MCNC: 8.6 MG/DL (ref 8.5–10.1)
CHLORIDE SERPL-SCNC: 100 MMOL/L (ref 98–107)
CK SERPL-CCNC: 395 U/L (ref 39–308)
CO2 SERPL-SCNC: 34 MMOL/L (ref 21–32)
CREAT SERPL-MCNC: 1.68 MG/DL (ref 0.7–1.3)
DIFFERENTIAL METHOD BLD: ABNORMAL
EGFR (NO RACE VARIABLE) (RUSH/TITUS): 51 ML/MIN/1.73M²
EOSINOPHIL # BLD AUTO: 0.17 K/UL (ref 0–0.5)
EOSINOPHIL NFR BLD AUTO: 1.5 % (ref 1–4)
ERYTHROCYTE [DISTWIDTH] IN BLOOD BY AUTOMATED COUNT: 16.5 % (ref 11.5–14.5)
GLOBULIN SER-MCNC: 4.1 G/DL (ref 2–4)
GLUCOSE SERPL-MCNC: 83 MG/DL (ref 74–106)
HCT VFR BLD AUTO: 24.9 % (ref 40–54)
HGB BLD-MCNC: 7.9 G/DL (ref 13.5–18)
IMM GRANULOCYTES # BLD AUTO: 0.29 K/UL (ref 0–0.04)
IMM GRANULOCYTES NFR BLD: 2.6 % (ref 0–0.4)
LYMPHOCYTES # BLD AUTO: 1.38 K/UL (ref 1–4.8)
LYMPHOCYTES NFR BLD AUTO: 12.4 % (ref 27–41)
MACROCYTES BLD QL SMEAR: ABNORMAL
MCH RBC QN AUTO: 33.3 PG (ref 27–31)
MCHC RBC AUTO-ENTMCNC: 31.7 G/DL (ref 32–36)
MCV RBC AUTO: 105.1 FL (ref 80–96)
MONOCYTES # BLD AUTO: 1.21 K/UL (ref 0–0.8)
MONOCYTES NFR BLD AUTO: 10.8 % (ref 2–6)
MPC BLD CALC-MCNC: 9.8 FL (ref 9.4–12.4)
NEUTROPHILS # BLD AUTO: 7.99 K/UL (ref 1.8–7.7)
NEUTROPHILS NFR BLD AUTO: 71.5 % (ref 53–65)
NRBC # BLD AUTO: 0.02 X10E3/UL
NRBC, AUTO (.00): 0.2 %
PLATELET # BLD AUTO: 749 K/UL (ref 150–400)
PLATELET MORPHOLOGY: ABNORMAL
POTASSIUM SERPL-SCNC: 3.4 MMOL/L (ref 3.5–5.1)
PROT SERPL-MCNC: 6.4 G/DL (ref 6.4–8.2)
RBC # BLD AUTO: 2.37 M/UL (ref 4.6–6.2)
SODIUM SERPL-SCNC: 143 MMOL/L (ref 136–145)
VALPROATE SERPL-MCNC: 28 ΜG/ML (ref 50–100)
WBC # BLD AUTO: 11.17 K/UL (ref 4.5–11)

## 2023-04-01 PROCEDURE — 99233 SBSQ HOSP IP/OBS HIGH 50: CPT | Mod: ,,, | Performed by: NURSE PRACTITIONER

## 2023-04-01 PROCEDURE — 25000003 PHARM REV CODE 250: Performed by: NURSE PRACTITIONER

## 2023-04-01 PROCEDURE — C9113 INJ PANTOPRAZOLE SODIUM, VIA: HCPCS | Performed by: NURSE PRACTITIONER

## 2023-04-01 PROCEDURE — 99900035 HC TECH TIME PER 15 MIN (STAT)

## 2023-04-01 PROCEDURE — 63600175 PHARM REV CODE 636 W HCPCS: Performed by: INTERNAL MEDICINE

## 2023-04-01 PROCEDURE — 82550 ASSAY OF CK (CPK): CPT | Performed by: INTERNAL MEDICINE

## 2023-04-01 PROCEDURE — 99233 PR SUBSEQUENT HOSPITAL CARE,LEVL III: ICD-10-PCS | Mod: ,,, | Performed by: NURSE PRACTITIONER

## 2023-04-01 PROCEDURE — 94761 N-INVAS EAR/PLS OXIMETRY MLT: CPT

## 2023-04-01 PROCEDURE — 97165 OT EVAL LOW COMPLEX 30 MIN: CPT

## 2023-04-01 PROCEDURE — 20000000 HC ICU ROOM

## 2023-04-01 PROCEDURE — 27000221 HC OXYGEN, UP TO 24 HOURS

## 2023-04-01 PROCEDURE — 80164 ASSAY DIPROPYLACETIC ACD TOT: CPT | Performed by: INTERNAL MEDICINE

## 2023-04-01 PROCEDURE — 25000003 PHARM REV CODE 250: Performed by: INTERNAL MEDICINE

## 2023-04-01 PROCEDURE — 63600175 PHARM REV CODE 636 W HCPCS: Performed by: NURSE PRACTITIONER

## 2023-04-01 PROCEDURE — 25000003 PHARM REV CODE 250: Performed by: HOSPITALIST

## 2023-04-01 PROCEDURE — 85025 COMPLETE CBC W/AUTO DIFF WBC: CPT | Performed by: NURSE PRACTITIONER

## 2023-04-01 PROCEDURE — 80053 COMPREHEN METABOLIC PANEL: CPT | Performed by: NURSE PRACTITIONER

## 2023-04-01 PROCEDURE — 97162 PT EVAL MOD COMPLEX 30 MIN: CPT

## 2023-04-01 PROCEDURE — 63600175 PHARM REV CODE 636 W HCPCS: Performed by: HOSPITALIST

## 2023-04-01 RX ORDER — FENTANYL 25 UG/1
1 PATCH TRANSDERMAL
Status: DISCONTINUED | OUTPATIENT
Start: 2023-04-01 | End: 2023-04-04

## 2023-04-01 RX ORDER — LORAZEPAM 2 MG/ML
1 INJECTION INTRAMUSCULAR EVERY 4 HOURS PRN
Status: DISCONTINUED | OUTPATIENT
Start: 2023-04-01 | End: 2023-04-01

## 2023-04-01 RX ORDER — HYDRALAZINE HYDROCHLORIDE 25 MG/1
25 TABLET, FILM COATED ORAL EVERY 8 HOURS
Status: DISCONTINUED | OUTPATIENT
Start: 2023-04-01 | End: 2023-04-10 | Stop reason: HOSPADM

## 2023-04-01 RX ORDER — LISINOPRIL 10 MG/1
10 TABLET ORAL DAILY
Status: DISCONTINUED | OUTPATIENT
Start: 2023-04-01 | End: 2023-04-04

## 2023-04-01 RX ORDER — AMLODIPINE BESYLATE 5 MG/1
5 TABLET ORAL DAILY
Status: DISCONTINUED | OUTPATIENT
Start: 2023-04-01 | End: 2023-04-07

## 2023-04-01 RX ORDER — LORAZEPAM 2 MG/ML
1 INJECTION INTRAMUSCULAR
Status: DISCONTINUED | OUTPATIENT
Start: 2023-04-01 | End: 2023-04-02

## 2023-04-01 RX ORDER — DIPHENHYDRAMINE HCL 25 MG
25 CAPSULE ORAL EVERY 6 HOURS PRN
Status: DISCONTINUED | OUTPATIENT
Start: 2023-04-01 | End: 2023-04-04

## 2023-04-01 RX ORDER — FENTANYL 12.5 UG/1
1 PATCH TRANSDERMAL
Status: DISCONTINUED | OUTPATIENT
Start: 2023-04-01 | End: 2023-04-10 | Stop reason: HOSPADM

## 2023-04-01 RX ORDER — LABETALOL HYDROCHLORIDE 5 MG/ML
10 INJECTION, SOLUTION INTRAVENOUS EVERY 4 HOURS PRN
Status: DISCONTINUED | OUTPATIENT
Start: 2023-04-01 | End: 2023-04-10 | Stop reason: HOSPADM

## 2023-04-01 RX ORDER — ONDANSETRON 4 MG/1
4 TABLET, ORALLY DISINTEGRATING ORAL EVERY 6 HOURS PRN
Status: DISCONTINUED | OUTPATIENT
Start: 2023-04-01 | End: 2023-04-09

## 2023-04-01 RX ORDER — IBUPROFEN 800 MG/1
800 TABLET ORAL EVERY 6 HOURS PRN
Status: DISCONTINUED | OUTPATIENT
Start: 2023-04-01 | End: 2023-04-10 | Stop reason: HOSPADM

## 2023-04-01 RX ADMIN — CLORAZEPATE DIPOTASSIUM 3.75 MG: 3.75 TABLET ORAL at 09:04

## 2023-04-01 RX ADMIN — LORAZEPAM 1 MG: 2 INJECTION INTRAMUSCULAR; INTRAVENOUS at 04:04

## 2023-04-01 RX ADMIN — IBUPROFEN 800 MG: 400 TABLET, FILM COATED ORAL at 11:04

## 2023-04-01 RX ADMIN — CLORAZEPATE DIPOTASSIUM 3.75 MG: 3.75 TABLET ORAL at 03:04

## 2023-04-01 RX ADMIN — LORAZEPAM 1 MG: 2 INJECTION INTRAMUSCULAR; INTRAVENOUS at 09:04

## 2023-04-01 RX ADMIN — LORAZEPAM 2 MG: 2 INJECTION INTRAMUSCULAR; INTRAVENOUS at 06:04

## 2023-04-01 RX ADMIN — ESCITALOPRAM OXALATE 10 MG: 10 TABLET ORAL at 08:04

## 2023-04-01 RX ADMIN — DIPHENHYDRAMINE HYDROCHLORIDE 50 MG: 50 INJECTION, SOLUTION INTRAMUSCULAR; INTRAVENOUS at 02:04

## 2023-04-01 RX ADMIN — LORAZEPAM 2 MG: 2 INJECTION INTRAMUSCULAR; INTRAVENOUS at 09:04

## 2023-04-01 RX ADMIN — ENOXAPARIN SODIUM 40 MG: 100 INJECTION SUBCUTANEOUS at 04:04

## 2023-04-01 RX ADMIN — DIVALPROEX SODIUM 250 MG: 250 TABLET, DELAYED RELEASE ORAL at 06:04

## 2023-04-01 RX ADMIN — PIPERACILLIN AND TAZOBACTAM 4.5 G: 4; .5 INJECTION, POWDER, FOR SOLUTION INTRAVENOUS; PARENTERAL at 02:04

## 2023-04-01 RX ADMIN — HYDRALAZINE HYDROCHLORIDE 25 MG: 25 TABLET ORAL at 02:04

## 2023-04-01 RX ADMIN — CLORAZEPATE DIPOTASSIUM 3.75 MG: 3.75 TABLET ORAL at 08:04

## 2023-04-01 RX ADMIN — PIPERACILLIN AND TAZOBACTAM 4.5 G: 4; .5 INJECTION, POWDER, FOR SOLUTION INTRAVENOUS; PARENTERAL at 09:04

## 2023-04-01 RX ADMIN — DIPHENHYDRAMINE HYDROCHLORIDE 50 MG: 50 INJECTION, SOLUTION INTRAMUSCULAR; INTRAVENOUS at 08:04

## 2023-04-01 RX ADMIN — ONDANSETRON 4 MG: 4 TABLET, ORALLY DISINTEGRATING ORAL at 04:04

## 2023-04-01 RX ADMIN — FOLIC ACID 1 MG: 1 TABLET ORAL at 08:04

## 2023-04-01 RX ADMIN — PIPERACILLIN AND TAZOBACTAM 4.5 G: 4; .5 INJECTION, POWDER, FOR SOLUTION INTRAVENOUS; PARENTERAL at 06:04

## 2023-04-01 RX ADMIN — HYDRALAZINE HYDROCHLORIDE 25 MG: 25 TABLET ORAL at 09:04

## 2023-04-01 RX ADMIN — FENTANYL 1 PATCH: 12.5 PATCH TRANSDERMAL at 03:04

## 2023-04-01 RX ADMIN — FENTANYL 1 PATCH: 25 PATCH TRANSDERMAL at 03:04

## 2023-04-01 RX ADMIN — DIVALPROEX SODIUM 250 MG: 250 TABLET, DELAYED RELEASE ORAL at 09:04

## 2023-04-01 RX ADMIN — DIPHENHYDRAMINE HYDROCHLORIDE 25 MG: 25 CAPSULE ORAL at 04:04

## 2023-04-01 RX ADMIN — LORAZEPAM 1 MG: 2 INJECTION INTRAMUSCULAR; INTRAVENOUS at 12:04

## 2023-04-01 RX ADMIN — ASPIRIN 81 MG: 81 TABLET, DELAYED RELEASE ORAL at 08:04

## 2023-04-01 RX ADMIN — ACETAMINOPHEN 1000 MG: 500 TABLET ORAL at 09:04

## 2023-04-01 RX ADMIN — POTASSIUM BICARBONATE 25 MEQ: 977.5 TABLET, EFFERVESCENT ORAL at 08:04

## 2023-04-01 RX ADMIN — LISINOPRIL 10 MG: 10 TABLET ORAL at 08:04

## 2023-04-01 RX ADMIN — ACETAMINOPHEN 1000 MG: 500 TABLET ORAL at 01:04

## 2023-04-01 RX ADMIN — THIAMINE HCL TAB 100 MG 100 MG: 100 TAB at 08:04

## 2023-04-01 RX ADMIN — LABETALOL HYDROCHLORIDE 10 MG: 5 INJECTION INTRAVENOUS at 11:04

## 2023-04-01 RX ADMIN — PANTOPRAZOLE SODIUM 40 MG: 40 INJECTION, POWDER, FOR SOLUTION INTRAVENOUS at 08:04

## 2023-04-01 RX ADMIN — DIVALPROEX SODIUM 250 MG: 250 TABLET, DELAYED RELEASE ORAL at 02:04

## 2023-04-01 RX ADMIN — AMLODIPINE BESYLATE 5 MG: 5 TABLET ORAL at 08:04

## 2023-04-01 NOTE — PLAN OF CARE
Problem: Occupational Therapy  Goal: Occupational Therapy Goal  Description: STG:  Pt will perform grooming with setup  Pt will bathe with setup and CGA  Pt will perform UE dressing with setup  Pt will perform LE dressing with setup and min(A)  Pt will sit EOB x 10 min with independence during dynamic activity  Pt will transfer bed/chair/bsc with CGA  Pt will perform standing task x 3 min with SBA  Pt will tolerate 20 minutes of tx without fatigue      LT.Restore to max I with self care and mobility.     Outcome: Ongoing, Progressing   OT low complexity evaluation performed. OT will treat pt daily 5x/wk  to address the above treatment goals.   Pt had exploratory lap  And mallory- came from surgery at 1530pm- ng tube to right nare to lcs with dark  bloody drng--- has nuñez draining clear yellow urine--- on oxygen at 2 liters per nasal cannula--- continuous pulse ox sats  At 100%--- on epidural of bupivacaine at 8 ml an hour-- has morphine for pain as needed and scheduled Toradol pt states numbness to right leg  But does have sensation to it and is able to move it without any issues

## 2023-04-01 NOTE — PROGRESS NOTES
[facility]  Pulmonology  Progress Note    Patient Name: Carol Ladd Jr.  MRN: [id number]  Admission Date: 3/21/2023  Hospital Length of Stay: 11 days  Code Status: No Order  Attending Provider: Des Navarro MD  Primary Care Provider: Raymundo Cabrera MD   Principal Problem: Alcohol withdrawal syndrome with complication    Subjective:     Interval History: Pt resting in bed. Oriented, cooperative, and back to baseline at present. Denies any needs or complaints.       Objective:     Vital Signs (Most Recent):  Temp: 99.9 °F (37.7 °C) (04/01/23 1200)  Pulse: 109 (04/01/23 1200)  Resp: 18 (04/01/23 1200)  BP: (!) 148/82 (04/01/23 1200)  SpO2: 99 % (04/01/23 1200)   Vital Signs (24h Range):  Temp:  [98.5 °F (36.9 °C)-101.8 °F (38.8 °C)] 99.9 °F (37.7 °C)  Pulse:  [] 109  Resp:  [11-26] 18  SpO2:  [82 %-100 %] 99 %  BP: (108-231)/() 148/82     Weight: 109.2 kg (240 lb 11.9 oz)  Body mass index is 32.65 kg/m².      Intake/Output Summary (Last 24 hours) at 4/1/2023 1327  Last data filed at 4/1/2023 1030  Gross per 24 hour   Intake 240 ml   Output 2950 ml   Net -2710 ml       Physical Exam  Vitals reviewed.   Constitutional:       Appearance: Normal appearance.      Interventions: He is not intubated.  HENT:      Head: Normocephalic and atraumatic.      Nose: Nose normal.      Mouth/Throat:      Mouth: Mucous membranes are dry.      Pharynx: Oropharynx is clear.   Eyes:      Extraocular Movements: Extraocular movements intact.      Conjunctiva/sclera: Conjunctivae normal.      Pupils: Pupils are equal, round, and reactive to light.   Cardiovascular:      Rate and Rhythm: Normal rate.      Heart sounds: Normal heart sounds. No murmur heard.  Pulmonary:      Effort: Pulmonary effort is normal. He is not intubated.      Breath sounds: Normal breath sounds.   Abdominal:      General: Abdomen is flat. Bowel sounds are normal.      Palpations: Abdomen is soft.   Musculoskeletal:          General: Normal range of motion.      Cervical back: Normal range of motion and neck supple.      Right lower leg: No edema.      Left lower leg: No edema.      Comments: L knee with some warmth and swelling    Skin:     General: Skin is warm and dry.      Capillary Refill: Capillary refill takes less than 2 seconds.   Neurological:      General: No focal deficit present.      Mental Status: He is alert and oriented to person, place, and time.   Psychiatric:         Mood and Affect: Mood normal.         Behavior: Behavior normal.     Review of Systems    Vents:  Vent Mode: CPAP / PSV (03/28/23 1142)  Ventilator Initiated: Yes (03/23/23 0000)  Set Rate: 12 BPM (03/28/23 0428)  Vt Set: 500 mL (03/28/23 0428)  Pressure Support: 8 cmH20 (03/28/23 1142)  PEEP/CPAP: 5 cmH20 (03/28/23 1142)  Oxygen Concentration (%): 39 (04/01/23 1120)  Peak Airway Pressure: 14 cmH20 (03/28/23 1142)  Total Ve: 5.5 L/m (03/28/23 1142)  F/VT Ratio<105 (RSBI): (!) 27.66 (03/28/23 0015)    Lines/Drains/Airways       Drain  Duration                  Urethral Catheter 03/23/23 1000 Non-latex 16 Fr. 9 days              Peripheral Intravenous Line  Duration                  Peripheral IV - Single Lumen 03/25/23 1929 20 G Anterior;Left;Proximal Forearm 6 days         Peripheral IV - Single Lumen 03/27/23 1730 20 G Anterior;Distal;Left Forearm 4 days                    Significant Labs:    CBC/Anemia Profile:  Recent Labs   Lab 03/31/23 0519 04/01/23 0441   WBC 12.28* 11.17*   HGB 8.0* 7.9*   HCT 25.2* 24.9*   * 749*   .8* 105.1*   RDW 16.5* 16.5*        Chemistries:  Recent Labs   Lab 03/31/23 0519 04/01/23  0441    143   K 3.4* 3.4*   CL 97* 100   CO2 32 34*   BUN 44* 35*   CREATININE 1.83* 1.68*   CALCIUM 8.5 8.6   ALBUMIN 2.4* 2.3*   PROT 6.2* 6.4   BILITOT 1.4* 1.3*   ALKPHOS 96 140*   ALT 43 72*   AST 71* 90*       All pertinent labs within the past 24 hours have been reviewed.    Significant Imaging:  I have reviewed  all pertinent imaging results/findings within the past 24 hours.    Assessment/Plan:     Neuro  Encephalopathy, toxic  He seems to be a little bit better this morning we are going to wean off the Precedex add routine benzodiazepines he is on valproic acid and serotonin reuptake inhibitor.  No metabolic reason at this time  Encephalopathy continues to improve daily     Tourette syndrome  Little more prominent this morning  Controlled at present     Psychiatric  * Alcohol withdrawal syndrome with complication  Has to be out from this now but may be withdrawing from other medicines      Depression  Prozac and Doxepin on home medication list but states he was not taking these..   Was also started on those medications along  with tranxene, trazodone and Zyprexa at [place] over the last 2 days  -- Patient is likely starting to have withdraws from alcohol and subaxone; however, the combination of prozac and doxepin and trazadone all increase the risk of serotonin syndrome .. given this concern serotonin syndrome should be in the differential as well     Started serotonin reuptake inhibitor         Pulmonary  Acute respiratory failure with hypoxia and hypercarbia  Really believe his hypoxemia is related to volume were going to diurese him today watch him carefully  - weaning off HFNC; continue diuresis     Cardiac/Vascular  HTN (hypertension)  Seems a little bit better today  BP up-- meds adjusted     Renal/  Acute kidney injury  Creatinine running around 2 I am going to diurese him today  - trending down       Orthopedic  Rhabdomyolysis  Mildly elevated CPK do not believe active rhabdo  Continues to trend down     Other  Fever  Ongoing issue, no positive cultures but some changes on chest x-ray finish out the course of Zosyn I think this is a med related fever  Still with some fever; cultures neg                       Celi Sinha, FNP-AGACNP  Pulmonology  [facility]
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
## 2023-04-01 NOTE — ASSESSMENT & PLAN NOTE
Prozac and Doxepin on home medication list but states he was not taking these..   Was also started on those medications along  with tranxene, trazodone and Zyprexa at [facility] over the last 2 days  -- Patient is likely starting to have withdraws from alcohol and subaxone; however, the combination of prozac and doxepin and trazadone all increase the risk of serotonin syndrome .. given this concern serotonin syndrome should be in the differential as well     Started serotonin reuptake inhibitor

## 2023-04-01 NOTE — PLAN OF CARE
Problem: Physical Therapy  Goal: Physical Therapy Goal  Description: Short Term Goals to be met by: 4/15/23    Patient will increase functional independence with mobility by performin. Supine to sit with Stand by assist  2. Sit to stand transfer with Stand by assist using Rolling walker  3. Bed to chair transfer with Stand by assist using Rolling walker  4. Gait  x 100 feet with Stand by assist using Rolling walker  5. Lower extremity exercise program x30 reps per handout, with assistance as needed    Long Term Goals to be met by: 23    Pt will regain full independent functional mobility with lowest level of assistive device to return to home situation and prior activities of daily living.   Outcome: Ongoing, Progressing
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
## 2023-04-01 NOTE — ASSESSMENT & PLAN NOTE
Really believe his hypoxemia is related to volume were going to diurese him today watch him carefully  - weaning off HFNC; continue diuresis

## 2023-04-01 NOTE — SUBJECTIVE & OBJECTIVE
Interval History: Pt resting in bed. Oriented, cooperative, and back to baseline at present. Denies any needs or complaints.       Objective:     Vital Signs (Most Recent):  Temp: 99.9 °F (37.7 °C) (04/01/23 1200)  Pulse: 109 (04/01/23 1200)  Resp: 18 (04/01/23 1200)  BP: (!) 148/82 (04/01/23 1200)  SpO2: 99 % (04/01/23 1200)   Vital Signs (24h Range):  Temp:  [98.5 °F (36.9 °C)-101.8 °F (38.8 °C)] 99.9 °F (37.7 °C)  Pulse:  [] 109  Resp:  [11-26] 18  SpO2:  [82 %-100 %] 99 %  BP: (108-231)/() 148/82     Weight: 109.2 kg (240 lb 11.9 oz)  Body mass index is 32.65 kg/m².      Intake/Output Summary (Last 24 hours) at 4/1/2023 1327  Last data filed at 4/1/2023 1030  Gross per 24 hour   Intake 240 ml   Output 2950 ml   Net -2710 ml       Physical Exam  Vitals reviewed.   Constitutional:       Appearance: Normal appearance.      Interventions: He is not intubated.  HENT:      Head: Normocephalic and atraumatic.      Nose: Nose normal.      Mouth/Throat:      Mouth: Mucous membranes are dry.      Pharynx: Oropharynx is clear.   Eyes:      Extraocular Movements: Extraocular movements intact.      Conjunctiva/sclera: Conjunctivae normal.      Pupils: Pupils are equal, round, and reactive to light.   Cardiovascular:      Rate and Rhythm: Normal rate.      Heart sounds: Normal heart sounds. No murmur heard.  Pulmonary:      Effort: Pulmonary effort is normal. He is not intubated.      Breath sounds: Normal breath sounds.   Abdominal:      General: Abdomen is flat. Bowel sounds are normal.      Palpations: Abdomen is soft.   Musculoskeletal:         General: Normal range of motion.      Cervical back: Normal range of motion and neck supple.      Right lower leg: No edema.      Left lower leg: No edema.      Comments: L knee with some warmth and swelling    Skin:     General: Skin is warm and dry.      Capillary Refill: Capillary refill takes less than 2 seconds.   Neurological:      General: No focal deficit  present.      Mental Status: He is alert and oriented to person, place, and time.   Psychiatric:         Mood and Affect: Mood normal.         Behavior: Behavior normal.     Review of Systems    Vents:  Vent Mode: CPAP / PSV (03/28/23 1142)  Ventilator Initiated: Yes (03/23/23 0000)  Set Rate: 12 BPM (03/28/23 0428)  Vt Set: 500 mL (03/28/23 0428)  Pressure Support: 8 cmH20 (03/28/23 1142)  PEEP/CPAP: 5 cmH20 (03/28/23 1142)  Oxygen Concentration (%): 39 (04/01/23 1120)  Peak Airway Pressure: 14 cmH20 (03/28/23 1142)  Total Ve: 5.5 L/m (03/28/23 1142)  F/VT Ratio<105 (RSBI): (!) 27.66 (03/28/23 0015)    Lines/Drains/Airways       Drain  Duration                  Urethral Catheter 03/23/23 1000 Non-latex 16 Fr. 9 days              Peripheral Intravenous Line  Duration                  Peripheral IV - Single Lumen 03/25/23 1929 20 G Anterior;Left;Proximal Forearm 6 days         Peripheral IV - Single Lumen 03/27/23 1730 20 G Anterior;Distal;Left Forearm 4 days                    Significant Labs:    CBC/Anemia Profile:  Recent Labs   Lab 03/31/23  0519 04/01/23  0441   WBC 12.28* 11.17*   HGB 8.0* 7.9*   HCT 25.2* 24.9*   * 749*   .8* 105.1*   RDW 16.5* 16.5*        Chemistries:  Recent Labs   Lab 03/31/23  0519 04/01/23  0441    143   K 3.4* 3.4*   CL 97* 100   CO2 32 34*   BUN 44* 35*   CREATININE 1.83* 1.68*   CALCIUM 8.5 8.6   ALBUMIN 2.4* 2.3*   PROT 6.2* 6.4   BILITOT 1.4* 1.3*   ALKPHOS 96 140*   ALT 43 72*   AST 71* 90*       All pertinent labs within the past 24 hours have been reviewed.    Significant Imaging:  I have reviewed all pertinent imaging results/findings within the past 24 hours.

## 2023-04-01 NOTE — ASSESSMENT & PLAN NOTE
Ongoing issue, no positive cultures but some changes on chest x-ray finish out the course of Zosyn I think this is a med related fever  Still with some fever; cultures neg

## 2023-04-01 NOTE — PLAN OF CARE
Problem: Infection  Goal: Absence of Infection Signs and Symptoms  Outcome: Ongoing, Progressing     Problem: Adult Inpatient Plan of Care  Goal: Plan of Care Review  Outcome: Ongoing, Progressing  Goal: Patient-Specific Goal (Individualized)  Outcome: Ongoing, Progressing  Goal: Absence of Hospital-Acquired Illness or Injury  Outcome: Ongoing, Progressing  Goal: Optimal Comfort and Wellbeing  Outcome: Ongoing, Progressing  Goal: Readiness for Transition of Care  Outcome: Ongoing, Progressing     Problem: Fluid and Electrolyte Imbalance (Acute Kidney Injury/Impairment)  Goal: Fluid and Electrolyte Balance  Outcome: Ongoing, Progressing     Problem: Skin Injury Risk Increased  Goal: Skin Health and Integrity  Outcome: Ongoing, Progressing     Problem: Fall Injury Risk  Goal: Absence of Fall and Fall-Related Injury  Outcome: Ongoing, Progressing     Problem: Impaired Wound Healing  Goal: Optimal Wound Healing  Outcome: Ongoing, Progressing     Problem: Breathing Pattern Ineffective  Goal: Effective Breathing Pattern  Outcome: Ongoing, Progressing     Problem: Gas Exchange Impaired  Goal: Optimal Gas Exchange  Outcome: Ongoing, Progressing

## 2023-04-01 NOTE — ASSESSMENT & PLAN NOTE
He seems to be a little bit better this morning we are going to wean off the Precedex add routine benzodiazepines he is on valproic acid and serotonin reuptake inhibitor.  No metabolic reason at this time  Encephalopathy continues to improve daily

## 2023-04-02 LAB
ALBUMIN SERPL BCP-MCNC: 2.3 G/DL (ref 3.5–5)
ALBUMIN/GLOB SERPL: 0.5 {RATIO}
ALP SERPL-CCNC: 164 U/L (ref 45–115)
ALT SERPL W P-5'-P-CCNC: 81 U/L (ref 16–61)
ANION GAP SERPL CALCULATED.3IONS-SCNC: 12 MMOL/L (ref 7–16)
ANISOCYTOSIS BLD QL SMEAR: ABNORMAL
AST SERPL W P-5'-P-CCNC: 68 U/L (ref 15–37)
BASOPHILS # BLD AUTO: 0.12 K/UL (ref 0–0.2)
BASOPHILS NFR BLD AUTO: 0.9 % (ref 0–1)
BILIRUB SERPL-MCNC: 1 MG/DL (ref ?–1.2)
BUN SERPL-MCNC: 23 MG/DL (ref 7–18)
BUN/CREAT SERPL: 16 (ref 6–20)
CALCIUM SERPL-MCNC: 9 MG/DL (ref 8.5–10.1)
CHLORIDE SERPL-SCNC: 102 MMOL/L (ref 98–107)
CK SERPL-CCNC: 207 U/L (ref 39–308)
CO2 SERPL-SCNC: 33 MMOL/L (ref 21–32)
CREAT SERPL-MCNC: 1.45 MG/DL (ref 0.7–1.3)
DIFFERENTIAL METHOD BLD: ABNORMAL
EGFR (NO RACE VARIABLE) (RUSH/TITUS): 61 ML/MIN/1.73M²
EOSINOPHIL # BLD AUTO: 0.18 K/UL (ref 0–0.5)
EOSINOPHIL NFR BLD AUTO: 1.3 % (ref 1–4)
ERYTHROCYTE [DISTWIDTH] IN BLOOD BY AUTOMATED COUNT: 16.6 % (ref 11.5–14.5)
GLOBULIN SER-MCNC: 4.3 G/DL (ref 2–4)
GLUCOSE SERPL-MCNC: 105 MG/DL (ref 74–106)
HCT VFR BLD AUTO: 26.8 % (ref 40–54)
HGB BLD-MCNC: 8.5 G/DL (ref 13.5–18)
IMM GRANULOCYTES # BLD AUTO: 0.39 K/UL (ref 0–0.04)
IMM GRANULOCYTES NFR BLD: 2.8 % (ref 0–0.4)
LYMPHOCYTES # BLD AUTO: 1.21 K/UL (ref 1–4.8)
LYMPHOCYTES NFR BLD AUTO: 8.8 % (ref 27–41)
MACROCYTES BLD QL SMEAR: ABNORMAL
MCH RBC QN AUTO: 33.6 PG (ref 27–31)
MCHC RBC AUTO-ENTMCNC: 31.7 G/DL (ref 32–36)
MCV RBC AUTO: 105.9 FL (ref 80–96)
MONOCYTES # BLD AUTO: 0.96 K/UL (ref 0–0.8)
MONOCYTES NFR BLD AUTO: 7 % (ref 2–6)
MPC BLD CALC-MCNC: 9.6 FL (ref 9.4–12.4)
NEUTROPHILS # BLD AUTO: 10.85 K/UL (ref 1.8–7.7)
NEUTROPHILS NFR BLD AUTO: 79.2 % (ref 53–65)
NRBC # BLD AUTO: 0 X10E3/UL
NRBC, AUTO (.00): 0 %
PLATELET # BLD AUTO: 836 K/UL (ref 150–400)
PLATELET MORPHOLOGY: ABNORMAL
POTASSIUM SERPL-SCNC: 4 MMOL/L (ref 3.5–5.1)
PROT SERPL-MCNC: 6.6 G/DL (ref 6.4–8.2)
RBC # BLD AUTO: 2.53 M/UL (ref 4.6–6.2)
SODIUM SERPL-SCNC: 143 MMOL/L (ref 136–145)
WBC # BLD AUTO: 13.71 K/UL (ref 4.5–11)

## 2023-04-02 PROCEDURE — 80053 COMPREHEN METABOLIC PANEL: CPT | Performed by: NURSE PRACTITIONER

## 2023-04-02 PROCEDURE — 94761 N-INVAS EAR/PLS OXIMETRY MLT: CPT

## 2023-04-02 PROCEDURE — 63600175 PHARM REV CODE 636 W HCPCS: Performed by: NURSE PRACTITIONER

## 2023-04-02 PROCEDURE — 25000003 PHARM REV CODE 250: Performed by: NURSE PRACTITIONER

## 2023-04-02 PROCEDURE — 20000000 HC ICU ROOM

## 2023-04-02 PROCEDURE — 25000003 PHARM REV CODE 250: Performed by: HOSPITALIST

## 2023-04-02 PROCEDURE — 85025 COMPLETE CBC W/AUTO DIFF WBC: CPT | Performed by: NURSE PRACTITIONER

## 2023-04-02 PROCEDURE — 82550 ASSAY OF CK (CPK): CPT | Performed by: INTERNAL MEDICINE

## 2023-04-02 PROCEDURE — C9113 INJ PANTOPRAZOLE SODIUM, VIA: HCPCS | Performed by: NURSE PRACTITIONER

## 2023-04-02 PROCEDURE — 25000003 PHARM REV CODE 250: Performed by: INTERNAL MEDICINE

## 2023-04-02 PROCEDURE — 99900035 HC TECH TIME PER 15 MIN (STAT)

## 2023-04-02 PROCEDURE — 27000221 HC OXYGEN, UP TO 24 HOURS

## 2023-04-02 PROCEDURE — 63600175 PHARM REV CODE 636 W HCPCS: Performed by: INTERNAL MEDICINE

## 2023-04-02 RX ORDER — PANTOPRAZOLE SODIUM 40 MG/1
40 TABLET, DELAYED RELEASE ORAL DAILY
Status: DISCONTINUED | OUTPATIENT
Start: 2023-04-03 | End: 2023-04-10 | Stop reason: HOSPADM

## 2023-04-02 RX ORDER — CLORAZEPATE DIPOTASSIUM 3.75 MG/1
3.75 TABLET ORAL 2 TIMES DAILY
Status: DISCONTINUED | OUTPATIENT
Start: 2023-04-02 | End: 2023-04-10 | Stop reason: HOSPADM

## 2023-04-02 RX ORDER — POLYETHYLENE GLYCOL 3350 17 G/17G
17 POWDER, FOR SOLUTION ORAL DAILY PRN
Status: DISCONTINUED | OUTPATIENT
Start: 2023-04-02 | End: 2023-04-06

## 2023-04-02 RX ORDER — LORAZEPAM 2 MG/ML
1 INJECTION INTRAMUSCULAR EVERY 4 HOURS PRN
Status: DISCONTINUED | OUTPATIENT
Start: 2023-04-02 | End: 2023-04-04

## 2023-04-02 RX ADMIN — PIPERACILLIN AND TAZOBACTAM 4.5 G: 4; .5 INJECTION, POWDER, FOR SOLUTION INTRAVENOUS; PARENTERAL at 06:04

## 2023-04-02 RX ADMIN — ONDANSETRON 4 MG: 4 TABLET, ORALLY DISINTEGRATING ORAL at 03:04

## 2023-04-02 RX ADMIN — LORAZEPAM 1 MG: 2 INJECTION INTRAMUSCULAR; INTRAVENOUS at 11:04

## 2023-04-02 RX ADMIN — DIPHENHYDRAMINE HYDROCHLORIDE 25 MG: 25 CAPSULE ORAL at 03:04

## 2023-04-02 RX ADMIN — AMLODIPINE BESYLATE 5 MG: 5 TABLET ORAL at 08:04

## 2023-04-02 RX ADMIN — LORAZEPAM 1 MG: 2 INJECTION INTRAMUSCULAR; INTRAVENOUS at 06:04

## 2023-04-02 RX ADMIN — DIVALPROEX SODIUM 250 MG: 250 TABLET, DELAYED RELEASE ORAL at 03:04

## 2023-04-02 RX ADMIN — DIPHENHYDRAMINE HYDROCHLORIDE 25 MG: 25 CAPSULE ORAL at 12:04

## 2023-04-02 RX ADMIN — CLORAZEPATE DIPOTASSIUM 3.75 MG: 3.75 TABLET ORAL at 10:04

## 2023-04-02 RX ADMIN — ENOXAPARIN SODIUM 40 MG: 100 INJECTION SUBCUTANEOUS at 04:04

## 2023-04-02 RX ADMIN — ACETAMINOPHEN 1000 MG: 500 TABLET ORAL at 06:04

## 2023-04-02 RX ADMIN — PIPERACILLIN AND TAZOBACTAM 4.5 G: 4; .5 INJECTION, POWDER, FOR SOLUTION INTRAVENOUS; PARENTERAL at 03:04

## 2023-04-02 RX ADMIN — CLORAZEPATE DIPOTASSIUM 3.75 MG: 3.75 TABLET ORAL at 08:04

## 2023-04-02 RX ADMIN — ESCITALOPRAM OXALATE 10 MG: 10 TABLET ORAL at 08:04

## 2023-04-02 RX ADMIN — DIPHENHYDRAMINE HYDROCHLORIDE 25 MG: 25 CAPSULE ORAL at 10:04

## 2023-04-02 RX ADMIN — DIVALPROEX SODIUM 250 MG: 250 TABLET, DELAYED RELEASE ORAL at 10:04

## 2023-04-02 RX ADMIN — HYDRALAZINE HYDROCHLORIDE 25 MG: 25 TABLET ORAL at 10:04

## 2023-04-02 RX ADMIN — PANTOPRAZOLE SODIUM 40 MG: 40 INJECTION, POWDER, FOR SOLUTION INTRAVENOUS at 08:04

## 2023-04-02 RX ADMIN — ACETAMINOPHEN 1000 MG: 500 TABLET ORAL at 08:04

## 2023-04-02 RX ADMIN — ONDANSETRON 4 MG: 4 TABLET, ORALLY DISINTEGRATING ORAL at 09:04

## 2023-04-02 RX ADMIN — LORAZEPAM 1 MG: 2 INJECTION INTRAMUSCULAR; INTRAVENOUS at 03:04

## 2023-04-02 RX ADMIN — POLYETHYLENE GLYCOL 3350 17 G: 17 POWDER, FOR SOLUTION ORAL at 08:04

## 2023-04-02 RX ADMIN — HYDRALAZINE HYDROCHLORIDE 25 MG: 25 TABLET ORAL at 03:04

## 2023-04-02 RX ADMIN — LISINOPRIL 10 MG: 10 TABLET ORAL at 08:04

## 2023-04-02 RX ADMIN — LORAZEPAM 1 MG: 2 INJECTION INTRAMUSCULAR; INTRAVENOUS at 12:04

## 2023-04-02 RX ADMIN — THIAMINE HCL TAB 100 MG 100 MG: 100 TAB at 08:04

## 2023-04-02 RX ADMIN — FOLIC ACID 1 MG: 1 TABLET ORAL at 08:04

## 2023-04-02 RX ADMIN — HYDRALAZINE HYDROCHLORIDE 25 MG: 25 TABLET ORAL at 06:04

## 2023-04-02 RX ADMIN — DIPHENHYDRAMINE HYDROCHLORIDE 25 MG: 25 CAPSULE ORAL at 08:04

## 2023-04-02 RX ADMIN — IBUPROFEN 800 MG: 400 TABLET ORAL at 09:04

## 2023-04-02 RX ADMIN — LORAZEPAM 1 MG: 2 INJECTION INTRAMUSCULAR; INTRAVENOUS at 01:04

## 2023-04-02 RX ADMIN — ASPIRIN 81 MG: 81 TABLET, DELAYED RELEASE ORAL at 08:04

## 2023-04-02 RX ADMIN — DIVALPROEX SODIUM 250 MG: 250 TABLET, DELAYED RELEASE ORAL at 06:04

## 2023-04-02 NOTE — ASSESSMENT & PLAN NOTE
He seems to be a little bit better this morning we are going to wean off the Precedex add routine benzodiazepines he is on valproic acid and serotonin reuptake inhibitor.  No metabolic reason at this time  Encephalopathy continues to improve daily   Resolved

## 2023-04-02 NOTE — SUBJECTIVE & OBJECTIVE
Interval History: Pt resting in bed. Has been weaned to NC. Fever curve is trending down. Denies any needs or complaints.       Objective:     Vital Signs (Most Recent):  Temp: 99.1 °F (37.3 °C) (04/02/23 1300)  Pulse: 85 (04/02/23 1300)  Resp: 18 (04/02/23 1300)  BP: 125/68 (04/02/23 1300)  SpO2: (!) 93 % (04/02/23 1300)   Vital Signs (24h Range):  Temp:  [98.1 °F (36.7 °C)-100.6 °F (38.1 °C)] 99.1 °F (37.3 °C)  Pulse:  [] 85  Resp:  [12-30] 18  SpO2:  [58 %-100 %] 93 %  BP: (116-160)/(54-87) 125/68     Weight: 110.1 kg (242 lb 11.6 oz)  Body mass index is 32.92 kg/m².      Intake/Output Summary (Last 24 hours) at 4/2/2023 1340  Last data filed at 4/2/2023 1027  Gross per 24 hour   Intake 300 ml   Output 3075 ml   Net -2775 ml       Physical Exam  Vitals and nursing note reviewed.   Constitutional:       Appearance: Normal appearance.      Interventions: He is not intubated.  HENT:      Head: Normocephalic and atraumatic.      Nose: Nose normal.      Mouth/Throat:      Mouth: Mucous membranes are dry.      Pharynx: Oropharynx is clear.   Eyes:      Extraocular Movements: Extraocular movements intact.      Conjunctiva/sclera: Conjunctivae normal.      Pupils: Pupils are equal, round, and reactive to light.   Cardiovascular:      Rate and Rhythm: Normal rate.      Heart sounds: Normal heart sounds. No murmur heard.  Pulmonary:      Effort: Pulmonary effort is normal. He is not intubated.      Breath sounds: Normal breath sounds.   Abdominal:      General: Abdomen is flat. Bowel sounds are normal.      Palpations: Abdomen is soft.   Musculoskeletal:         General: Normal range of motion.      Cervical back: Normal range of motion and neck supple.      Right lower leg: No edema.      Left lower leg: No edema.      Comments: L knee with some warmth and swelling    Skin:     General: Skin is warm and dry.      Capillary Refill: Capillary refill takes less than 2 seconds.   Neurological:      General: No focal  deficit present.      Mental Status: He is alert and oriented to person, place, and time.   Psychiatric:         Mood and Affect: Mood normal.         Behavior: Behavior normal.     Review of Systems    Vents:  Vent Mode: CPAP / PSV (03/28/23 1142)  Ventilator Initiated: Yes (03/23/23 0000)  Set Rate: 12 BPM (03/28/23 0428)  Vt Set: 500 mL (03/28/23 0428)  Pressure Support: 8 cmH20 (03/28/23 1142)  PEEP/CPAP: 5 cmH20 (03/28/23 1142)  Oxygen Concentration (%): 40 (04/02/23 0813)  Peak Airway Pressure: 14 cmH20 (03/28/23 1142)  Total Ve: 5.5 L/m (03/28/23 1142)  F/VT Ratio<105 (RSBI): (!) 27.66 (03/28/23 0015)    Lines/Drains/Airways       Drain  Duration                  Urethral Catheter 03/23/23 1000 Non-latex 16 Fr. 10 days              Peripheral Intravenous Line  Duration                  Peripheral IV - Single Lumen 03/25/23 1929 20 G Anterior;Left;Proximal Forearm 7 days         Peripheral IV - Single Lumen 03/27/23 1730 20 G Anterior;Distal;Left Forearm 5 days                    Significant Labs:    CBC/Anemia Profile:  Recent Labs   Lab 04/01/23 0441 04/02/23  0253   WBC 11.17* 13.71*   HGB 7.9* 8.5*   HCT 24.9* 26.8*   * 836*   .1* 105.9*   RDW 16.5* 16.6*        Chemistries:  Recent Labs   Lab 04/01/23 0441 04/02/23  0253    143   K 3.4* 4.0    102   CO2 34* 33*   BUN 35* 23*   CREATININE 1.68* 1.45*   CALCIUM 8.6 9.0   ALBUMIN 2.3* 2.3*   PROT 6.4 6.6   BILITOT 1.3* 1.0   ALKPHOS 140* 164*   ALT 72* 81*   AST 90* 68*       All pertinent labs within the past 24 hours have been reviewed.    Significant Imaging:  I have reviewed all pertinent imaging results/findings within the past 24 hours.

## 2023-04-02 NOTE — PROGRESS NOTES
Pharmacist Intervention IV to PO Note    Carol Ladd Jr. is a 47 y.o. male being treated with IV medication pantoprazole    Patient Data:    Vital Signs (Most Recent):  Temp: 99 °F (37.2 °C) (04/02/23 1230)  Pulse: 89 (04/02/23 1230)  Resp: 14 (04/02/23 1230)  BP: (!) 140/68 (04/02/23 1230)  SpO2: (!) 94 % (04/02/23 1230)   Vital Signs (72h Range):  Temp:  [97.5 °F (36.4 °C)-101.8 °F (38.8 °C)]   Pulse:  []   Resp:  [9-31]   BP: ()/()   SpO2:  [58 %-100 %]      CBC:  Recent Labs   Lab 03/31/23  0519 04/01/23  0441 04/02/23  0253   WBC 12.28* 11.17* 13.71*   RBC 2.36* 2.37* 2.53*   HGB 8.0* 7.9* 8.5*   HCT 25.2* 24.9* 26.8*   * 749* 836*   .8* 105.1* 105.9*   MCH 33.9* 33.3* 33.6*   MCHC 31.7* 31.7* 31.7*     CMP:     Recent Labs   Lab 03/31/23  0519 04/01/23  0441 04/02/23  0253   * 83 105   CALCIUM 8.5 8.6 9.0   ALBUMIN 2.4* 2.3* 2.3*   PROT 6.2* 6.4 6.6    143 143   K 3.4* 3.4* 4.0   CO2 32 34* 33*   CL 97* 100 102   BUN 44* 35* 23*   CREATININE 1.83* 1.68* 1.45*   ALKPHOS 96 140* 164*   ALT 43 72* 81*   AST 71* 90* 68*   BILITOT 1.4* 1.3* 1.0       Dietary Orders:  Diet Orders            Diet Adult Regular (IDDSI Level 7): Regular starting at 03/31 1055    Dietary nutrition supplements Rush; Ensure Max Protein - Any Flavor starting at 03/30 1300            Based on the following criteria, this patient qualifies for intravenous to oral conversion:  [x] The patients gastrointestinal tract is functioning (tolerating medications via oral or enteral route for 24 hours and tolerating food or enteral feeds for 24 hours).  [x] The patient is hemodynamically stable for 24 hours (heart rate <100 beats per minute, systolic blood pressure >99 mm Hg, and respiratory rate <20 breaths per minute).  [x] The patient shows clinical improvement (afebrile for at least 24 hours and white blood cell count downtrending or normalized). Additionally, the patient must be  non-neutropenic (absolute neutrophil count >500 cells/mm3).  [x] For antimicrobials, the patient has received IV therapy for at least 24 hours.    IV medication Protonix will be changed to oral medication Protonix    Pharmacist's Name: Valentín Anderson  Pharmacist's Extension: [phone]

## 2023-04-02 NOTE — PROGRESS NOTES
[facility]  Pulmonology  Progress Note    Patient Name: Carol Ladd Jr.  MRN: [id number]  Admission Date: 3/21/2023  Hospital Length of Stay: 12 days  Code Status: No Order  Attending Provider: Des Navarro MD  Primary Care Provider: Raymundo Cabrera MD   Principal Problem: Alcohol withdrawal syndrome with complication    Subjective:     Interval History: Pt resting in bed. Has been weaned to NC. Fever curve is trending down. Denies any needs or complaints.       Objective:     Vital Signs (Most Recent):  Temp: 99.1 °F (37.3 °C) (04/02/23 1300)  Pulse: 85 (04/02/23 1300)  Resp: 18 (04/02/23 1300)  BP: 125/68 (04/02/23 1300)  SpO2: (!) 93 % (04/02/23 1300)   Vital Signs (24h Range):  Temp:  [98.1 °F (36.7 °C)-100.6 °F (38.1 °C)] 99.1 °F (37.3 °C)  Pulse:  [] 85  Resp:  [12-30] 18  SpO2:  [58 %-100 %] 93 %  BP: (116-160)/(54-87) 125/68     Weight: 110.1 kg (242 lb 11.6 oz)  Body mass index is 32.92 kg/m².      Intake/Output Summary (Last 24 hours) at 4/2/2023 1340  Last data filed at 4/2/2023 1027  Gross per 24 hour   Intake 300 ml   Output 3075 ml   Net -2775 ml       Physical Exam  Vitals and nursing note reviewed.   Constitutional:       Appearance: Normal appearance.      Interventions: He is not intubated.  HENT:      Head: Normocephalic and atraumatic.      Nose: Nose normal.      Mouth/Throat:      Mouth: Mucous membranes are dry.      Pharynx: Oropharynx is clear.   Eyes:      Extraocular Movements: Extraocular movements intact.      Conjunctiva/sclera: Conjunctivae normal.      Pupils: Pupils are equal, round, and reactive to light.   Cardiovascular:      Rate and Rhythm: Normal rate.      Heart sounds: Normal heart sounds. No murmur heard.  Pulmonary:      Effort: Pulmonary effort is normal. He is not intubated.      Breath sounds: Normal breath sounds.   Abdominal:      General: Abdomen is flat. Bowel sounds are normal.      Palpations: Abdomen is soft.    Musculoskeletal:         General: Normal range of motion.      Cervical back: Normal range of motion and neck supple.      Right lower leg: No edema.      Left lower leg: No edema.      Comments: L knee with some warmth and swelling    Skin:     General: Skin is warm and dry.      Capillary Refill: Capillary refill takes less than 2 seconds.   Neurological:      General: No focal deficit present.      Mental Status: He is alert and oriented to person, place, and time.   Psychiatric:         Mood and Affect: Mood normal.         Behavior: Behavior normal.     Review of Systems    Vents:  Vent Mode: CPAP / PSV (03/28/23 1142)  Ventilator Initiated: Yes (03/23/23 0000)  Set Rate: 12 BPM (03/28/23 0428)  Vt Set: 500 mL (03/28/23 0428)  Pressure Support: 8 cmH20 (03/28/23 1142)  PEEP/CPAP: 5 cmH20 (03/28/23 1142)  Oxygen Concentration (%): 40 (04/02/23 0813)  Peak Airway Pressure: 14 cmH20 (03/28/23 1142)  Total Ve: 5.5 L/m (03/28/23 1142)  F/VT Ratio<105 (RSBI): (!) 27.66 (03/28/23 0015)    Lines/Drains/Airways       Drain  Duration                  Urethral Catheter 03/23/23 1000 Non-latex 16 Fr. 10 days              Peripheral Intravenous Line  Duration                  Peripheral IV - Single Lumen 03/25/23 1929 20 G Anterior;Left;Proximal Forearm 7 days         Peripheral IV - Single Lumen 03/27/23 1730 20 G Anterior;Distal;Left Forearm 5 days                    Significant Labs:    CBC/Anemia Profile:  Recent Labs   Lab 04/01/23 0441 04/02/23 0253   WBC 11.17* 13.71*   HGB 7.9* 8.5*   HCT 24.9* 26.8*   * 836*   .1* 105.9*   RDW 16.5* 16.6*        Chemistries:  Recent Labs   Lab 04/01/23 0441 04/02/23  0253    143   K 3.4* 4.0    102   CO2 34* 33*   BUN 35* 23*   CREATININE 1.68* 1.45*   CALCIUM 8.6 9.0   ALBUMIN 2.3* 2.3*   PROT 6.4 6.6   BILITOT 1.3* 1.0   ALKPHOS 140* 164*   ALT 72* 81*   AST 90* 68*       All pertinent labs within the past 24 hours have been reviewed.    Significant  Imaging:  I have reviewed all pertinent imaging results/findings within the past 24 hours.    Assessment/Plan:     Neuro  Encephalopathy, toxic  He seems to be a little bit better this morning we are going to wean off the Precedex add routine benzodiazepines he is on valproic acid and serotonin reuptake inhibitor.  No metabolic reason at this time  Encephalopathy continues to improve daily   Resolved     AMS (altered mental status)  Currently sedated on vent   Resolved     Psychiatric  * Alcohol withdrawal syndrome with complication  Has to be out from this now but may be withdrawing from other medicines  Much improved at present     Depression  Prozac and Doxepin on home medication list but states he was not taking these..   Was also started on those medications along  with tranxene, trazodone and Zyprexa at [place] over the last 2 days  -- Patient is likely starting to have withdraws from alcohol and subaxone; however, the combination of prozac and doxepin and trazadone all increase the risk of serotonin syndrome .. given this concern serotonin syndrome should be in the differential as well     Started serotonin reuptake inhibitor         Pulmonary  Acute respiratory failure with hypoxia and hypercarbia  Really believe his hypoxemia is related to volume were going to diurese him today watch him carefully  - weaning off HFNC; continue diuresis   - has been weaned to NC      Cardiac/Vascular  HTN (hypertension)  Seems a little bit better today  BP up-- meds adjusted   Trend stable today     Renal/  Acute kidney injury  Creatinine running around 2 I am going to diurese him today  - trending down   Improving daily     Other  Fever  Ongoing issue, no positive cultures but some changes on chest x-ray finish out the course of Zosyn I think this is a med related fever  Still with some fever; cultures neg  Fever curve trending down; cultures negative, WBC remains stable                      Celi Sinha  FNP-AGACNP  Pulmonology  [facility]

## 2023-04-02 NOTE — PLAN OF CARE
Problem: Infection  Goal: Absence of Infection Signs and Symptoms  Outcome: Ongoing, Progressing     Problem: Adult Inpatient Plan of Care  Goal: Plan of Care Review  Outcome: Ongoing, Progressing  Goal: Patient-Specific Goal (Individualized)  Outcome: Ongoing, Progressing  Goal: Absence of Hospital-Acquired Illness or Injury  Outcome: Ongoing, Progressing  Goal: Optimal Comfort and Wellbeing  Outcome: Ongoing, Progressing  Goal: Readiness for Transition of Care  Outcome: Ongoing, Progressing     Problem: Skin Injury Risk Increased  Goal: Skin Health and Integrity  Outcome: Ongoing, Progressing     Problem: Fall Injury Risk  Goal: Absence of Fall and Fall-Related Injury  Outcome: Ongoing, Progressing     Problem: Impaired Wound Healing  Goal: Optimal Wound Healing  Outcome: Ongoing, Progressing     Problem: Gas Exchange Impaired  Goal: Optimal Gas Exchange  Outcome: Ongoing, Progressing     Problem: Breathing Pattern Ineffective  Goal: Effective Breathing Pattern  Outcome: Ongoing, Progressing

## 2023-04-02 NOTE — ASSESSMENT & PLAN NOTE
Really believe his hypoxemia is related to volume were going to diurese him today watch him carefully  - weaning off HFNC; continue diuresis   - has been weaned to NC

## 2023-04-02 NOTE — ASSESSMENT & PLAN NOTE
Prozac and Doxepin on home medication list but states he was not taking these..   Was also started on those medications along  with tranxene, trazodone and Zyprexa at [place] over the last 2 days  -- Patient is likely starting to have withdraws from alcohol and subaxone; however, the combination of prozac and doxepin and trazadone all increase the risk of serotonin syndrome .. given this concern serotonin syndrome should be in the differential as well     Started serotonin reuptake inhibitor

## 2023-04-02 NOTE — ASSESSMENT & PLAN NOTE
Has to be out from this now but may be withdrawing from other medicines  Much improved at present

## 2023-04-02 NOTE — ASSESSMENT & PLAN NOTE
Ongoing issue, no positive cultures but some changes on chest x-ray finish out the course of Zosyn I think this is a med related fever  Still with some fever; cultures neg  Fever curve trending down; cultures negative, WBC remains stable

## 2023-04-03 PROBLEM — R41.82 AMS (ALTERED MENTAL STATUS): Status: RESOLVED | Noted: 2023-03-21 | Resolved: 2023-04-03

## 2023-04-03 PROBLEM — E87.20 METABOLIC ACIDOSIS: Status: RESOLVED | Noted: 2023-03-21 | Resolved: 2023-04-03

## 2023-04-03 PROBLEM — G92.9 ENCEPHALOPATHY, TOXIC: Status: RESOLVED | Noted: 2023-03-23 | Resolved: 2023-04-03

## 2023-04-03 PROBLEM — J96.02 ACUTE RESPIRATORY FAILURE WITH HYPOXIA AND HYPERCARBIA: Status: RESOLVED | Noted: 2023-03-27 | Resolved: 2023-04-03

## 2023-04-03 PROBLEM — R79.89 ELEVATED TROPONIN LEVEL NOT DUE MYOCARDIAL INFARCTION: Status: RESOLVED | Noted: 2023-03-23 | Resolved: 2023-04-03

## 2023-04-03 PROBLEM — M62.82 RHABDOMYOLYSIS: Status: RESOLVED | Noted: 2023-03-21 | Resolved: 2023-04-03

## 2023-04-03 PROBLEM — J96.01 ACUTE RESPIRATORY FAILURE WITH HYPOXIA AND HYPERCARBIA: Status: RESOLVED | Noted: 2023-03-27 | Resolved: 2023-04-03

## 2023-04-03 PROBLEM — F10.939 ALCOHOL WITHDRAWAL SYNDROME WITH COMPLICATION: Status: RESOLVED | Noted: 2023-03-21 | Resolved: 2023-04-03

## 2023-04-03 LAB
ALBUMIN SERPL BCP-MCNC: 2.3 G/DL (ref 3.5–5)
ALBUMIN/GLOB SERPL: 0.4 {RATIO}
ALP SERPL-CCNC: 136 U/L (ref 45–115)
ALT SERPL W P-5'-P-CCNC: 57 U/L (ref 16–61)
ANION GAP SERPL CALCULATED.3IONS-SCNC: 13 MMOL/L (ref 7–16)
ANISOCYTOSIS BLD QL SMEAR: ABNORMAL
AST SERPL W P-5'-P-CCNC: 37 U/L (ref 15–37)
BASOPHILS # BLD AUTO: 0.11 K/UL (ref 0–0.2)
BASOPHILS NFR BLD AUTO: 0.6 % (ref 0–1)
BILIRUB SERPL-MCNC: 0.9 MG/DL (ref ?–1.2)
BUN SERPL-MCNC: 24 MG/DL (ref 7–18)
BUN/CREAT SERPL: 19 (ref 6–20)
CALCIUM SERPL-MCNC: 9.5 MG/DL (ref 8.5–10.1)
CHLORIDE SERPL-SCNC: 101 MMOL/L (ref 98–107)
CO2 SERPL-SCNC: 30 MMOL/L (ref 21–32)
CREAT SERPL-MCNC: 1.27 MG/DL (ref 0.7–1.3)
DIFFERENTIAL METHOD BLD: ABNORMAL
EGFR (NO RACE VARIABLE) (RUSH/TITUS): 71 ML/MIN/1.73M²
EOSINOPHIL # BLD AUTO: 0.21 K/UL (ref 0–0.5)
EOSINOPHIL NFR BLD AUTO: 1.2 % (ref 1–4)
ERYTHROCYTE [DISTWIDTH] IN BLOOD BY AUTOMATED COUNT: 16.2 % (ref 11.5–14.5)
GLOBULIN SER-MCNC: 5.5 G/DL (ref 2–4)
GLUCOSE SERPL-MCNC: 115 MG/DL (ref 74–106)
HCT VFR BLD AUTO: 25.8 % (ref 40–54)
HGB BLD-MCNC: 8.2 G/DL (ref 13.5–18)
HYPOCHROMIA BLD QL SMEAR: ABNORMAL
IMM GRANULOCYTES # BLD AUTO: 0.39 K/UL (ref 0–0.04)
IMM GRANULOCYTES NFR BLD: 2.2 % (ref 0–0.4)
LYMPHOCYTES # BLD AUTO: 1.07 K/UL (ref 1–4.8)
LYMPHOCYTES NFR BLD AUTO: 5.9 % (ref 27–41)
MACROCYTES BLD QL SMEAR: ABNORMAL
MCH RBC QN AUTO: 33.7 PG (ref 27–31)
MCHC RBC AUTO-ENTMCNC: 31.8 G/DL (ref 32–36)
MCV RBC AUTO: 106.2 FL (ref 80–96)
MONOCYTES # BLD AUTO: 1.03 K/UL (ref 0–0.8)
MONOCYTES NFR BLD AUTO: 5.7 % (ref 2–6)
MPC BLD CALC-MCNC: 9.5 FL (ref 9.4–12.4)
NEUTROPHILS # BLD AUTO: 15.24 K/UL (ref 1.8–7.7)
NEUTROPHILS NFR BLD AUTO: 84.4 % (ref 53–65)
NRBC # BLD AUTO: 0.02 X10E3/UL
NRBC, AUTO (.00): 0.1 %
PLATELET # BLD AUTO: 822 K/UL (ref 150–400)
PLATELET MORPHOLOGY: ABNORMAL
POLYCHROMASIA BLD QL SMEAR: ABNORMAL
POTASSIUM SERPL-SCNC: 3.2 MMOL/L (ref 3.5–5.1)
PROT SERPL-MCNC: 7.8 G/DL (ref 6.4–8.2)
RBC # BLD AUTO: 2.43 M/UL (ref 4.6–6.2)
SODIUM SERPL-SCNC: 141 MMOL/L (ref 136–145)
STOMATOCYTES BLD QL SMEAR: ABNORMAL
WBC # BLD AUTO: 18.05 K/UL (ref 4.5–11)

## 2023-04-03 PROCEDURE — 97530 THERAPEUTIC ACTIVITIES: CPT

## 2023-04-03 PROCEDURE — 63600175 PHARM REV CODE 636 W HCPCS: Performed by: NURSE PRACTITIONER

## 2023-04-03 PROCEDURE — 99233 PR SUBSEQUENT HOSPITAL CARE,LEVL III: ICD-10-PCS | Mod: ,,, | Performed by: INTERNAL MEDICINE

## 2023-04-03 PROCEDURE — 11000001 HC ACUTE MED/SURG PRIVATE ROOM

## 2023-04-03 PROCEDURE — 85025 COMPLETE CBC W/AUTO DIFF WBC: CPT | Performed by: NURSE PRACTITIONER

## 2023-04-03 PROCEDURE — 25000003 PHARM REV CODE 250: Performed by: NURSE PRACTITIONER

## 2023-04-03 PROCEDURE — 27000221 HC OXYGEN, UP TO 24 HOURS

## 2023-04-03 PROCEDURE — 25000003 PHARM REV CODE 250: Performed by: INTERNAL MEDICINE

## 2023-04-03 PROCEDURE — 80053 COMPREHEN METABOLIC PANEL: CPT | Performed by: NURSE PRACTITIONER

## 2023-04-03 PROCEDURE — 99233 SBSQ HOSP IP/OBS HIGH 50: CPT | Mod: ,,, | Performed by: INTERNAL MEDICINE

## 2023-04-03 PROCEDURE — 25000003 PHARM REV CODE 250: Performed by: HOSPITALIST

## 2023-04-03 PROCEDURE — 94761 N-INVAS EAR/PLS OXIMETRY MLT: CPT

## 2023-04-03 PROCEDURE — 99900035 HC TECH TIME PER 15 MIN (STAT)

## 2023-04-03 PROCEDURE — 97110 THERAPEUTIC EXERCISES: CPT

## 2023-04-03 RX ORDER — MAG HYDROX/ALUMINUM HYD/SIMETH 200-200-20
30 SUSPENSION, ORAL (FINAL DOSE FORM) ORAL ONCE
Status: COMPLETED | OUTPATIENT
Start: 2023-04-03 | End: 2023-04-03

## 2023-04-03 RX ORDER — LIDOCAINE HYDROCHLORIDE 20 MG/ML
15 SOLUTION OROPHARYNGEAL ONCE
Status: COMPLETED | OUTPATIENT
Start: 2023-04-03 | End: 2023-04-03

## 2023-04-03 RX ADMIN — HYDRALAZINE HYDROCHLORIDE 25 MG: 25 TABLET ORAL at 09:04

## 2023-04-03 RX ADMIN — FOLIC ACID 1 MG: 1 TABLET ORAL at 08:04

## 2023-04-03 RX ADMIN — HYDRALAZINE HYDROCHLORIDE 25 MG: 25 TABLET ORAL at 06:04

## 2023-04-03 RX ADMIN — POTASSIUM BICARBONATE 20 MEQ: 782 TABLET, EFFERVESCENT ORAL at 01:04

## 2023-04-03 RX ADMIN — DIVALPROEX SODIUM 250 MG: 250 TABLET, DELAYED RELEASE ORAL at 09:04

## 2023-04-03 RX ADMIN — ACETAMINOPHEN 1000 MG: 500 TABLET ORAL at 08:04

## 2023-04-03 RX ADMIN — LISINOPRIL 10 MG: 10 TABLET ORAL at 08:04

## 2023-04-03 RX ADMIN — DIVALPROEX SODIUM 250 MG: 250 TABLET, DELAYED RELEASE ORAL at 01:04

## 2023-04-03 RX ADMIN — ENOXAPARIN SODIUM 40 MG: 100 INJECTION SUBCUTANEOUS at 05:04

## 2023-04-03 RX ADMIN — ACETAMINOPHEN 1000 MG: 500 TABLET ORAL at 09:04

## 2023-04-03 RX ADMIN — IBUPROFEN 800 MG: 400 TABLET ORAL at 12:04

## 2023-04-03 RX ADMIN — LORAZEPAM 1 MG: 2 INJECTION INTRAMUSCULAR; INTRAVENOUS at 08:04

## 2023-04-03 RX ADMIN — ESCITALOPRAM OXALATE 10 MG: 10 TABLET ORAL at 08:04

## 2023-04-03 RX ADMIN — CLORAZEPATE DIPOTASSIUM 3.75 MG: 3.75 TABLET ORAL at 08:04

## 2023-04-03 RX ADMIN — DIPHENHYDRAMINE HYDROCHLORIDE 25 MG: 25 CAPSULE ORAL at 09:04

## 2023-04-03 RX ADMIN — AMLODIPINE BESYLATE 5 MG: 5 TABLET ORAL at 08:04

## 2023-04-03 RX ADMIN — ALUMINUM HYDROXIDE, MAGNESIUM HYDROXIDE, AND SIMETHICONE 30 ML: 200; 200; 20 SUSPENSION ORAL at 04:04

## 2023-04-03 RX ADMIN — LORAZEPAM 1 MG: 2 INJECTION INTRAMUSCULAR; INTRAVENOUS at 10:04

## 2023-04-03 RX ADMIN — LORAZEPAM 1 MG: 2 INJECTION INTRAMUSCULAR; INTRAVENOUS at 01:04

## 2023-04-03 RX ADMIN — PANTOPRAZOLE SODIUM 40 MG: 40 TABLET, DELAYED RELEASE ORAL at 08:04

## 2023-04-03 RX ADMIN — THIAMINE HCL TAB 100 MG 100 MG: 100 TAB at 08:04

## 2023-04-03 RX ADMIN — LORAZEPAM 1 MG: 2 INJECTION INTRAMUSCULAR; INTRAVENOUS at 04:04

## 2023-04-03 RX ADMIN — HYDRALAZINE HYDROCHLORIDE 25 MG: 25 TABLET ORAL at 01:04

## 2023-04-03 RX ADMIN — ONDANSETRON 4 MG: 4 TABLET, ORALLY DISINTEGRATING ORAL at 08:04

## 2023-04-03 RX ADMIN — DIVALPROEX SODIUM 250 MG: 250 TABLET, DELAYED RELEASE ORAL at 06:04

## 2023-04-03 RX ADMIN — ASPIRIN 81 MG: 81 TABLET, DELAYED RELEASE ORAL at 08:04

## 2023-04-03 RX ADMIN — LIDOCAINE HYDROCHLORIDE 15 ML: 20 SOLUTION ORAL; TOPICAL at 04:04

## 2023-04-03 NOTE — ASSESSMENT & PLAN NOTE
Prozac and Doxepin on home medication list but states he was not taking these..   Was also started on those medications along  with tranxene, trazodone and Zyprexa at [place] over the last 2 days  -- Patient is likely starting to have withdraws from alcohol and subaxone; however, the combination of prozac and doxepin and trazadone all increase the risk of serotonin syndrome .. given this concern serotonin syndrome should be in the differential as well     Started serotonin reuptake inhibitor

## 2023-04-03 NOTE — PLAN OF CARE
Per notes and IDT meeting this day pt will transfer to floor this day. Will follow dc needs as arise.

## 2023-04-03 NOTE — ASSESSMENT & PLAN NOTE
He seems to be a little bit better this morning we are going to wean off the Precedex add routine benzodiazepines he is on valproic acid and serotonin reuptake inhibitor.  No metabolic reason at this time  Encephalopathy continues to improve daily   Resolved believe he can go to the floor

## 2023-04-03 NOTE — PROGRESS NOTES
[facility]  Pulmonology  Progress Note    Patient Name: Carol Ladd Jr.  MRN: [id number]  Admission Date: 3/21/2023  Hospital Length of Stay: 13 days  Code Status: No Order  Attending Provider: Des Navarro MD  Primary Care Provider: Raymundo Cabrera MD   Principal Problem: Alcohol withdrawal syndrome with complication    Subjective:     Interval History:  Patient without complaints      Objective:     Vital Signs (Most Recent):  Temp: 99.7 °F (37.6 °C) (04/03/23 0330)  Pulse: 104 (04/03/23 0330)  Resp: 17 (04/03/23 0330)  BP: (!) 150/72 (04/03/23 0330)  SpO2: (!) 94 % (04/03/23 0330)   Vital Signs (24h Range):  Temp:  [97.7 °F (36.5 °C)-100.4 °F (38 °C)] 99.7 °F (37.6 °C)  Pulse:  [] 104  Resp:  [12-30] 17  SpO2:  [58 %-99 %] 94 %  BP: (116-180)/() 150/72     Weight: 109.4 kg (241 lb 2.9 oz)  Body mass index is 32.71 kg/m².      Intake/Output Summary (Last 24 hours) at 4/3/2023 0627  Last data filed at 4/3/2023 0611  Gross per 24 hour   Intake 677.5 ml   Output 1975 ml   Net -1297.5 ml       Physical Exam  Vitals reviewed.   Constitutional:       Appearance: Normal appearance.      Interventions: He is not intubated.  HENT:      Head: Normocephalic and atraumatic.      Nose: Nose normal.      Mouth/Throat:      Mouth: Mucous membranes are dry.      Pharynx: Oropharynx is clear.   Eyes:      Extraocular Movements: Extraocular movements intact.      Conjunctiva/sclera: Conjunctivae normal.      Pupils: Pupils are equal, round, and reactive to light.   Cardiovascular:      Rate and Rhythm: Normal rate.      Heart sounds: Normal heart sounds. No murmur heard.  Pulmonary:      Effort: Pulmonary effort is normal. He is not intubated.      Breath sounds: Normal breath sounds.   Abdominal:      General: Abdomen is flat. Bowel sounds are normal.      Palpations: Abdomen is soft.   Musculoskeletal:         General: Normal range of motion.      Cervical back: Normal range of  motion and neck supple.      Right lower leg: No edema.      Left lower leg: No edema.   Skin:     General: Skin is warm and dry.      Capillary Refill: Capillary refill takes less than 2 seconds.   Neurological:      General: No focal deficit present.      Mental Status: He is alert and oriented to person, place, and time.   Psychiatric:         Mood and Affect: Mood normal.         Behavior: Behavior normal.     Review of Systems    Vents:  Vent Mode: CPAP / PSV (03/28/23 1142)  Ventilator Initiated: Yes (03/23/23 0000)  Set Rate: 12 BPM (03/28/23 0428)  Vt Set: 500 mL (03/28/23 0428)  Pressure Support: 8 cmH20 (03/28/23 1142)  PEEP/CPAP: 5 cmH20 (03/28/23 1142)  Oxygen Concentration (%): 40 (04/02/23 0813)  Peak Airway Pressure: 14 cmH20 (03/28/23 1142)  Total Ve: 5.5 L/m (03/28/23 1142)  F/VT Ratio<105 (RSBI): (!) 27.66 (03/28/23 0015)    Lines/Drains/Airways       Drain  Duration                  Urethral Catheter 03/23/23 1000 Non-latex 16 Fr. 10 days              Peripheral Intravenous Line  Duration                  Peripheral IV - Single Lumen 03/25/23 1929 20 G Anterior;Left;Proximal Forearm 8 days         Peripheral IV - Single Lumen 03/27/23 1730 20 G Anterior;Distal;Left Forearm 6 days                    Significant Labs:    CBC/Anemia Profile:  Recent Labs   Lab 04/02/23 0253   WBC 13.71*   HGB 8.5*   HCT 26.8*   *   .9*   RDW 16.6*        Chemistries:  Recent Labs   Lab 04/02/23 0253      K 4.0      CO2 33*   BUN 23*   CREATININE 1.45*   CALCIUM 9.0   ALBUMIN 2.3*   PROT 6.6   BILITOT 1.0   ALKPHOS 164*   ALT 81*   AST 68*       Recent Lab Results       None            Significant Imaging:  I have reviewed all pertinent imaging results/findings within the past 24 hours.    Assessment/Plan:     Neuro  Encephalopathy, toxic  He seems to be a little bit better this morning we are going to wean off the Precedex add routine benzodiazepines he is on valproic acid and serotonin  reuptake inhibitor.  No metabolic reason at this time  Encephalopathy continues to improve daily   Resolved believe he can go to the floor    AMS (altered mental status)  Currently sedated on vent   Resolved     Tourette syndrome  Little more prominent this morning  Controlled at present     Psychiatric  * Alcohol withdrawal syndrome with complication  Resolved    Depression  Prozac and Doxepin on home medication list but states he was not taking these..   Was also started on those medications along  with tranxene, trazodone and Zyprexa at [place] over the last 2 days  -- Patient is likely starting to have withdraws from alcohol and subaxone; however, the combination of prozac and doxepin and trazadone all increase the risk of serotonin syndrome .. given this concern serotonin syndrome should be in the differential as well     Started serotonin reuptake inhibitor         Pulmonary  Acute respiratory failure with hypoxia and hypercarbia  Really believe his hypoxemia is related to volume were going to diurese him today watch him carefully  - weaning off HFNC; continue diuresis   - has been weaned to NC      Cardiac/Vascular  Elevated troponin level not due myocardial infarction  Seen by cardiology     HTN (hypertension)  Seems a little bit better today  BP up-- meds adjusted   Trend stable today     Renal/  Metabolic acidosis  Resolved    Acute kidney injury  Creatinine running around 2 I am going to diurese him today  - trending down   Improving daily     Orthopedic  Rhabdomyolysis  Resolved    Other  Fever  Low-grade temp multifactorial I think atelectasis medicines antibiotics he is little bit of diarrhea will stop Zosyn will watch carefully        Obstructive sleep apnea  Noted use BiPAP at night will need outpatient sleep study                 Des Navarro MD  Pulmonology  [facility]

## 2023-04-03 NOTE — ASSESSMENT & PLAN NOTE
Low-grade temp multifactorial I think atelectasis medicines antibiotics he is little bit of diarrhea will stop Zosyn will watch carefully

## 2023-04-03 NOTE — NURSING TRANSFER
Nursing Transfer Note      4/3/2023 1250    Reason patient is being transferred: MD order    Transfer from ICU room 5 to 422    Transfer via wheelchair    Transfer with O2 3L NC    Transported by RN    Chart send with patient: YES    Notified: Pt's mother         Upon arrival to floor: patient oriented to room, call bell in reach, and bed in lowest position

## 2023-04-03 NOTE — PT/OT/SLP PROGRESS
Physical Therapy Treatment    Patient Name:  Carol Ladd Jr.   MRN:  [id number]    Recommendations:     Discharge Recommendations: rehabilitation facility, nursing facility, skilled  Discharge Equipment Recommendations:  (to be determined)  Barriers to discharge: Decreased caregiver support    Assessment:     Carol Ladd Jr. is a 47 y.o. male admitted with a medical diagnosis of Alcohol withdrawal syndrome with complication.  He presents with the following impairments/functional limitations: weakness, impaired endurance, impaired functional mobility, gait instability, decreased lower extremity function, pain, impaired skin, impaired cardiopulmonary response to activity. PT demo improved cognition as compared to eval. Pt still SOB with mobility with sats in lower 90s with 3L O2.     Rehab Prognosis: Good; patient would benefit from acute skilled PT services to address these deficits and reach maximum level of function.    Recent Surgery: * No surgery found *      Plan:     During this hospitalization, patient to be seen 5 x/week to address the identified rehab impairments via gait training, therapeutic activities, therapeutic exercises, neuromuscular re-education and progress toward the following goals:    Plan of Care Expires:  05/01/23    Subjective     Chief Complaint: ETOH withdrawal with complication  Patient/Family Comments/goals: agreeable to PT  Pain/Comfort:  Pain Rating 1: 8/10  Location - Side 1: Left  Location 1: knee  Pain Addressed 1: Pre-medicate for activity      Objective:     Communicated with nursing prior to session.  Patient found HOB elevated with peripheral IV, oxygen upon PT entry to room.     General Precautions: Standard, fall, respiratory  Orthopedic Precautions: N/A  Braces: N/A  Respiratory Status: Nasal cannula, flow 3 L/min     Functional Mobility:  Bed Mobility:     Supine to Sit: contact guard assistance and minimum assistance  Transfers:     Sit to Stand:  minimum  assistance and of 2 persons with hand-held assist  Gait: approx 15ft-20ft at EOB and recliner with bilat HHA, Min A x 2 with cues for steppage and posture  Balance: Fair in stance      AM-PAC 6 CLICK MOBILITY  Turning over in bed (including adjusting bedclothes, sheets and blankets)?: 3  Sitting down on and standing up from a chair with arms (e.g., wheelchair, bedside commode, etc.): 3  Moving from lying on back to sitting on the side of the bed?: 3  Moving to and from a bed to a chair (including a wheelchair)?: 3  Need to walk in hospital room?: 3  Climbing 3-5 steps with a railing?: 2  Basic Mobility Total Score: 17       Treatment & Education:  Mobility as stated above.     Patient left up in chair with all lines intact, call button in reach, and OTR present..    GOALS:   Multidisciplinary Problems       Physical Therapy Goals          Problem: Physical Therapy    Goal Priority Disciplines Outcome Goal Variances Interventions   Physical Therapy Goal     PT, PT/OT Ongoing, Progressing     Description: Short Term Goals to be met by: 4/15/23    Patient will increase functional independence with mobility by performin. Supine to sit with Stand by assist  2. Sit to stand transfer with Stand by assist using Rolling walker  3. Bed to chair transfer with Stand by assist using Rolling walker  4. Gait  x 100 feet with Stand by assist using Rolling walker  5. Lower extremity exercise program x30 reps per handout, with assistance as needed    Long Term Goals to be met by: 23    Pt will regain full independent functional mobility with lowest level of assistive device to return to home situation and prior activities of daily living.                        Time Tracking:     PT Received On: 23  PT Start Time: 1626     PT Stop Time: 1640  PT Total Time (min): 14 min     Billable Minutes: Therapeutic Activity 14    Treatment Type: Treatment  PT/PTA: PT           2023

## 2023-04-03 NOTE — PT/OT/SLP PROGRESS
Occupational Therapy   Treatment    Name: Carol Ladd Jr.  MRN: [id number]  Admitting Diagnosis:  Alcohol withdrawal syndrome with complication       Recommendations:     Discharge Recommendations: rehabilitation facility, nursing facility, skilled  Discharge Equipment Recommendations:   (to be determined)  Barriers to discharge:  None    Assessment:     Carol Ladd Jr. is a 47 y.o. male with a medical diagnosis of Alcohol withdrawal syndrome with complication.  He presents with complaint of (L) knee pain.Pt agreed to OT treatment. Performance deficits affecting function are weakness, impaired endurance, impaired functional mobility, gait instability.     Rehab Prognosis:  Good; patient would benefit from acute skilled OT services to address these deficits and reach maximum level of function.       Plan:     Patient to be seen 5 x/week to address the above listed problems via self-care/home management, therapeutic activities, therapeutic exercises  Plan of Care Expires: 04/28/23  Plan of Care Reviewed with: patient    Subjective     Chief Complaint: Alcohol withdrawal syndrome with complication  Patient/Family Comments/goals: Recommend swing bed at d/c  Pain/Comfort:  Pain Rating 1: 8/10  Location - Side 1: Left  Location 1: knee  Pain Rating Post-Intervention 1: 8/10    Objective:     Communicated with: RN  prior to session.  Patient found HOB elevated with peripheral IV, oxygen upon OT entry to room.    General Precautions: Standard, fall    Orthopedic Precautions:N/A  Braces: N/A  Respiratory Status: Nasal cannula, flow 3 L/min     Occupational Performance:     Bed Mobility:    Patient completed Supine to Sit with stand by assistance     Functional Mobility/Transfers:  Patient completed Sit <> Stand Transfer with minimum assistance and of 2 persons  with  hand-held assist   Patient completed Bed <> Chair Transfer using Step Transfer technique with minimum assistance and of x 2 persons with hand-held  assist  Functional Mobility: Min a x 2 with hand held assistance  Pt performed sit stand with min a using RW for hygiene    Activities of Daily Living:  Upper Body Dressing: setup to sly gown .      AMPAC 6 Click ADL:      Treatment & Education:  Pt performed UE strengthening exercises. Green theraband x 2 sets of 15 reps (B) shoulder flexion/extension, adduction/abduction and (B) elbow and wrist flexion/extension. 2 lb hand wt x 2 sets of 15 reps (B) elbow flexion and extension.    Pt participated well with tx.    Patient left up in chair with all lines intact and call button in reach    GOALS:   Multidisciplinary Problems       Occupational Therapy Goals          Problem: Occupational Therapy    Goal Priority Disciplines Outcome Interventions   Occupational Therapy Goal     OT, PT/OT Ongoing, Progressing    Description: STG:  Pt will perform grooming with setup  Pt will bathe with setup and CGA  Pt will perform UE dressing with setup  Pt will perform LE dressing with setup and min(A)  Pt will sit EOB x 10 min with independence during dynamic activity  Pt will transfer bed/chair/bsc with CGA  Pt will perform standing task x 3 min with SBA  Pt will tolerate 20 minutes of tx without fatigue      LT.Restore to max I with self care and mobility.                          Time Tracking:     OT Date of Treatment: 23  OT Start Time: 1630  OT Stop Time: 1655  OT Total Time (min): 25 min    Billable Minutes:Therapeutic Exercise 15    OT/IRENE: OT          4/3/2023

## 2023-04-03 NOTE — SUBJECTIVE & OBJECTIVE
Interval History:  Patient without complaints      Objective:     Vital Signs (Most Recent):  Temp: 99.7 °F (37.6 °C) (04/03/23 0330)  Pulse: 104 (04/03/23 0330)  Resp: 17 (04/03/23 0330)  BP: (!) 150/72 (04/03/23 0330)  SpO2: (!) 94 % (04/03/23 0330)   Vital Signs (24h Range):  Temp:  [97.7 °F (36.5 °C)-100.4 °F (38 °C)] 99.7 °F (37.6 °C)  Pulse:  [] 104  Resp:  [12-30] 17  SpO2:  [58 %-99 %] 94 %  BP: (116-180)/() 150/72     Weight: 109.4 kg (241 lb 2.9 oz)  Body mass index is 32.71 kg/m².      Intake/Output Summary (Last 24 hours) at 4/3/2023 0627  Last data filed at 4/3/2023 0611  Gross per 24 hour   Intake 677.5 ml   Output 1975 ml   Net -1297.5 ml       Physical Exam  Vitals reviewed.   Constitutional:       Appearance: Normal appearance.      Interventions: He is not intubated.  HENT:      Head: Normocephalic and atraumatic.      Nose: Nose normal.      Mouth/Throat:      Mouth: Mucous membranes are dry.      Pharynx: Oropharynx is clear.   Eyes:      Extraocular Movements: Extraocular movements intact.      Conjunctiva/sclera: Conjunctivae normal.      Pupils: Pupils are equal, round, and reactive to light.   Cardiovascular:      Rate and Rhythm: Normal rate.      Heart sounds: Normal heart sounds. No murmur heard.  Pulmonary:      Effort: Pulmonary effort is normal. He is not intubated.      Breath sounds: Normal breath sounds.   Abdominal:      General: Abdomen is flat. Bowel sounds are normal.      Palpations: Abdomen is soft.   Musculoskeletal:         General: Normal range of motion.      Cervical back: Normal range of motion and neck supple.      Right lower leg: No edema.      Left lower leg: No edema.   Skin:     General: Skin is warm and dry.      Capillary Refill: Capillary refill takes less than 2 seconds.   Neurological:      General: No focal deficit present.      Mental Status: He is alert and oriented to person, place, and time.   Psychiatric:         Mood and Affect: Mood normal.          Behavior: Behavior normal.     Review of Systems    Vents:  Vent Mode: CPAP / PSV (03/28/23 1142)  Ventilator Initiated: Yes (03/23/23 0000)  Set Rate: 12 BPM (03/28/23 0428)  Vt Set: 500 mL (03/28/23 0428)  Pressure Support: 8 cmH20 (03/28/23 1142)  PEEP/CPAP: 5 cmH20 (03/28/23 1142)  Oxygen Concentration (%): 40 (04/02/23 0813)  Peak Airway Pressure: 14 cmH20 (03/28/23 1142)  Total Ve: 5.5 L/m (03/28/23 1142)  F/VT Ratio<105 (RSBI): (!) 27.66 (03/28/23 0015)    Lines/Drains/Airways       Drain  Duration                  Urethral Catheter 03/23/23 1000 Non-latex 16 Fr. 10 days              Peripheral Intravenous Line  Duration                  Peripheral IV - Single Lumen 03/25/23 1929 20 G Anterior;Left;Proximal Forearm 8 days         Peripheral IV - Single Lumen 03/27/23 1730 20 G Anterior;Distal;Left Forearm 6 days                    Significant Labs:    CBC/Anemia Profile:  Recent Labs   Lab 04/02/23  0253   WBC 13.71*   HGB 8.5*   HCT 26.8*   *   .9*   RDW 16.6*        Chemistries:  Recent Labs   Lab 04/02/23  0253      K 4.0      CO2 33*   BUN 23*   CREATININE 1.45*   CALCIUM 9.0   ALBUMIN 2.3*   PROT 6.6   BILITOT 1.0   ALKPHOS 164*   ALT 81*   AST 68*       Recent Lab Results       None            Significant Imaging:  I have reviewed all pertinent imaging results/findings within the past 24 hours.

## 2023-04-03 NOTE — ASSESSMENT & PLAN NOTE
Noted use BiPAP at night will need outpatient sleep study   PATIENT NOTIFIED THAT SURGERY HAS BEEN CANCELLED DUE TO COVID-19.   SHE WILL GIVE US A CALL BACK WHEN SHE WANTS TO RESCHEDULE

## 2023-04-04 PROCEDURE — 94761 N-INVAS EAR/PLS OXIMETRY MLT: CPT

## 2023-04-04 PROCEDURE — 99900035 HC TECH TIME PER 15 MIN (STAT)

## 2023-04-04 PROCEDURE — 94660 CPAP INITIATION&MGMT: CPT

## 2023-04-04 PROCEDURE — 99233 PR SUBSEQUENT HOSPITAL CARE,LEVL III: ICD-10-PCS | Mod: ,,, | Performed by: HOSPITALIST

## 2023-04-04 PROCEDURE — 25000003 PHARM REV CODE 250: Performed by: INTERNAL MEDICINE

## 2023-04-04 PROCEDURE — 25000003 PHARM REV CODE 250: Performed by: HOSPITALIST

## 2023-04-04 PROCEDURE — 97110 THERAPEUTIC EXERCISES: CPT

## 2023-04-04 PROCEDURE — 99233 SBSQ HOSP IP/OBS HIGH 50: CPT | Mod: ,,, | Performed by: HOSPITALIST

## 2023-04-04 PROCEDURE — 63600175 PHARM REV CODE 636 W HCPCS: Performed by: INTERNAL MEDICINE

## 2023-04-04 PROCEDURE — 99900031 HC PATIENT EDUCATION (STAT)

## 2023-04-04 PROCEDURE — 25000003 PHARM REV CODE 250: Performed by: NURSE PRACTITIONER

## 2023-04-04 PROCEDURE — 27000190 HC CPAP FULL FACE MASK W/VALVE

## 2023-04-04 PROCEDURE — 63600175 PHARM REV CODE 636 W HCPCS: Performed by: NURSE PRACTITIONER

## 2023-04-04 PROCEDURE — 11000001 HC ACUTE MED/SURG PRIVATE ROOM

## 2023-04-04 RX ORDER — LORAZEPAM 2 MG/ML
1 INJECTION INTRAMUSCULAR EVERY 8 HOURS PRN
Status: DISCONTINUED | OUTPATIENT
Start: 2023-04-04 | End: 2023-04-05

## 2023-04-04 RX ORDER — LISINOPRIL 20 MG/1
20 TABLET ORAL DAILY
Status: DISCONTINUED | OUTPATIENT
Start: 2023-04-05 | End: 2023-04-07

## 2023-04-04 RX ORDER — LACTOBACILLUS ACIDOPHILUS 500MM CELL
2 CAPSULE ORAL
Status: DISCONTINUED | OUTPATIENT
Start: 2023-04-05 | End: 2023-04-05

## 2023-04-04 RX ADMIN — AMLODIPINE BESYLATE 5 MG: 5 TABLET ORAL at 08:04

## 2023-04-04 RX ADMIN — FOLIC ACID 1 MG: 1 TABLET ORAL at 08:04

## 2023-04-04 RX ADMIN — CLORAZEPATE DIPOTASSIUM 3.75 MG: 3.75 TABLET ORAL at 08:04

## 2023-04-04 RX ADMIN — HYDRALAZINE HYDROCHLORIDE 25 MG: 25 TABLET ORAL at 03:04

## 2023-04-04 RX ADMIN — LORAZEPAM 1 MG: 2 INJECTION INTRAMUSCULAR; INTRAVENOUS at 01:04

## 2023-04-04 RX ADMIN — DIPHENHYDRAMINE HYDROCHLORIDE 25 MG: 25 CAPSULE ORAL at 07:04

## 2023-04-04 RX ADMIN — HYDRALAZINE HYDROCHLORIDE 25 MG: 25 TABLET ORAL at 09:04

## 2023-04-04 RX ADMIN — DIVALPROEX SODIUM 250 MG: 250 TABLET, DELAYED RELEASE ORAL at 05:04

## 2023-04-04 RX ADMIN — DIVALPROEX SODIUM 250 MG: 250 TABLET, DELAYED RELEASE ORAL at 03:04

## 2023-04-04 RX ADMIN — PANTOPRAZOLE SODIUM 40 MG: 40 TABLET, DELAYED RELEASE ORAL at 08:04

## 2023-04-04 RX ADMIN — LORAZEPAM 1 MG: 2 INJECTION INTRAMUSCULAR; INTRAVENOUS at 10:04

## 2023-04-04 RX ADMIN — LORAZEPAM 1 MG: 2 INJECTION INTRAMUSCULAR; INTRAVENOUS at 05:04

## 2023-04-04 RX ADMIN — LORAZEPAM 1 MG: 2 INJECTION INTRAMUSCULAR; INTRAVENOUS at 09:04

## 2023-04-04 RX ADMIN — IBUPROFEN 800 MG: 400 TABLET ORAL at 07:04

## 2023-04-04 RX ADMIN — ESCITALOPRAM OXALATE 10 MG: 10 TABLET ORAL at 08:04

## 2023-04-04 RX ADMIN — LISINOPRIL 10 MG: 10 TABLET ORAL at 08:04

## 2023-04-04 RX ADMIN — HYDRALAZINE HYDROCHLORIDE 25 MG: 25 TABLET ORAL at 05:04

## 2023-04-04 RX ADMIN — ASPIRIN 81 MG: 81 TABLET, DELAYED RELEASE ORAL at 08:04

## 2023-04-04 RX ADMIN — ENOXAPARIN SODIUM 40 MG: 100 INJECTION SUBCUTANEOUS at 05:04

## 2023-04-04 RX ADMIN — DIVALPROEX SODIUM 250 MG: 250 TABLET, DELAYED RELEASE ORAL at 10:04

## 2023-04-04 RX ADMIN — THIAMINE HCL TAB 100 MG 100 MG: 100 TAB at 08:04

## 2023-04-04 RX ADMIN — CLORAZEPATE DIPOTASSIUM 3.75 MG: 3.75 TABLET ORAL at 09:04

## 2023-04-04 NOTE — PLAN OF CARE
Consult for d/c planning. Ss spoke with pt and pt wants to [facility] to [facility] at [facility]/c. Dr informed to let ss know a day ahead of d/c to make referral to [facility]. Ss following.

## 2023-04-04 NOTE — PLAN OF CARE
Problem: Adult Inpatient Plan of Care  Goal: Optimal Comfort and Wellbeing  Outcome: Ongoing, Progressing  Goal: Readiness for Transition of Care  Outcome: Ongoing, Progressing     Problem: Fluid and Electrolyte Imbalance (Acute Kidney Injury/Impairment)  Goal: Fluid and Electrolyte Balance  Outcome: Ongoing, Progressing     Problem: Breathing Pattern Ineffective  Goal: Effective Breathing Pattern  Outcome: Ongoing, Progressing

## 2023-04-04 NOTE — PT/OT/SLP PROGRESS
Occupational Therapy      Patient Name:  Carol Perezgabby Dunn.   MRN:  [id number]    Patient not seen today secondary to  (pt declined OT today secondary to not feeling well). Will follow-up on next treatment day.    4/4/2023

## 2023-04-04 NOTE — PROGRESS NOTES
[facility]  Adult Nutrition  Follow Up Note    SUMMARY       Recommendations    Recommendation/Intervention: Recommend continue with Regular diet as ordered + Ensure Max Protein. PO intake is 25-50%.  Goals: tolerance of diet, intake % + supplements, weight maintenance  Nutrition Goal Status: progressing towards goal  Communication of RD Recs: discussed on rounds    Assessment and Plan  RD follow up. Current weight is 241 lbs. Patient is now on a Regular diet as of 3/31/23+ Ensure Max Protein.    Last BM 4/4 per flowsheets. Labs/meds reviewed. RD following.     Nutrition Diagnosis  Altered nutrition related labs  related to Renal dysfunction as evidenced by EVELYN    Nutrition Diagnosis Status: Chronic/ continues     Reason for Assessment    Reason For Assessment: RD follow-up  Relevant Medical History: alcohol withdrawal, toureettes, EVELYN, depression, metabolic acidosis, AMS    Nutrition Risk Screen    Nutrition Risk Screen: no indicators present    Nutrition/Diet History    Spiritual, Cultural Beliefs, Faith Practices, Values that Affect Care: no  Food Allergies: NKFA  Factors Affecting Nutritional Intake: None identified at this time    Anthropometrics    Temp: 98.1 °F (36.7 °C)  Height: 6' (182.9 cm)  Height (inches): 72 in  Weight Method: Bed Scale  Weight: 109.4 kg (241 lb 2.9 oz)  Weight (lb): 241.19 lb  Ideal Body Weight (IBW), Male: 178 lb  % Ideal Body Weight, Male (lb): 142.06 %  BMI (Calculated): 32.7  BMI Grade: 30 - 34.9- obesity - grade I       Lab/Procedures/Meds   Latest Reference Range & Units 04/03/23 04:57   Sodium 136 - 145 mmol/L 141   Potassium 3.5 - 5.1 mmol/L 3.2 (L)   Chloride 98 - 107 mmol/L 101   CO2 21 - 32 mmol/L 30   Anion Gap 7 - 16 mmol/L 13   BUN 7 - 18 mg/dL 24 (H)   Creatinine 0.70 - 1.30 mg/dL 1.27   BUN/CREAT RATIO 6 - 20  19   eGFR >=60 mL/min/1.73m² 71   Glucose 74 - 106 mg/dL 115 (H)   Calcium 8.5 - 10.1 mg/dL 9.5   Alkaline Phosphatase 45 - 115  U/L 136 (H)   PROTEIN TOTAL 6.4 - 8.2 g/dL 7.8   Albumin 3.5 - 5.0 g/dL 2.  Albumin/Globulin Ratio  0.4   3 (L)        BILIRUBIN TOTAL >0.0 - 1.2 mg/dL 0.9   AST 15 - 37 U/L 37   ALT 16 - 61 U/L 57   (L): Data is abnormally low  (H): Data is abnormally high  Note: Elevated BUN, -monitor renal labs. Will adjust protein recommendations as needed.  Low albumin.     Pertinent Labs Reviewed: reviewed  Pertinent Labs Comments:   Pertinent Medications Reviewed: reviewed  Pertinent Medications Comments: aspirin, enoxaparin, levetiracetam, pantoprazole, zosyn, polyethylene glycol    Estimated/Assessed Needs    Weight Used For Calorie Calculations: 87.7 kg (193 lb 5.5 oz) (adjusted weight used)  Energy Calorie Requirements (kcal): 6795-5631  Energy Need Method: Kcal/kg  Protein Requirements:   Weight Used For Protein Calculations: 87.7 kg (193 lb 5.5 oz)        RDA Method (mL): 2192           Evaluation of Received Nutrient/Fluid Intake    % Intake of Estimated Energy Needs: 75 - 100 %  % Meal Intake: 25-50%  Nutrition Risk    Level of Risk/Frequency of Follow-up: moderate     Monitor and Evaluation    Food and Nutrient Intake: food and beverage intake  Food and Nutrient Adminstration: diet order  Anthropometric Measurements: weight, weight change, body mass index  Biochemical Data, Medical Tests and Procedures: electrolyte and renal panel, lipid profile, gastrointestinal profile, glucose/endocrine profile, inflammatory profile  Nutrition-Focused Physical Findings: overall appearance, extremities, muscles and bones, head and eyes, skin     Nutrition Follow-Up    RD Follow-up?: Yes

## 2023-04-04 NOTE — CONSULTS
[facility] - Short Stay Unit  Adult Nutrition  Consult Note         Reason for Assessment  Reason For Assessment: RD follow-up + consult for poor nutrition/PO intake + wounds  Nutrition Risk Screen: no indicators present    Assessment and Plan  Consult received and appreciated. RD following pt.     Current weight is 241 lbs, last RD assessment pt weighed 240 lbs. Per chart records, pt with 25 lb weigh tloss x1 day - still unsure if weight was accurate. Will continue to monitor weight trends.     Pt is currently receiving a Regular diet + Ensure Max Protein TID. Per flowsheets, pt consuming 25-50% of meals. Intake is not adequate to meet nutritional needs. RD visited with pt to obtain food preferences to increase kcal/pro intake. RD to add to diet order. Pt requesting different supplement, receptive to trying Ensure Clear TID, RD to change.     If poor PO intake continues, consider addition of appetite stimulant.     Recommend addition of Charanjit BID to aid in wound healing. Consider addition of 500 mg Vit C BID + 220 mg ZnSO4 BID + mvi daily to aid in wound healing as well.    Last BM 4/4 per flowsheets. Labs/meds reviewed. RD following.       Malnutrition  Is Patient Malnourished: No  Nutrition Diagnosis  Inadequate energy intake   related to decreased appetite/PO intake as evidenced by consuming 25-50% of meals, not adequate to meet nutritional needs.      Nutrition Diagnosis Status: continues     Nutrition Risk  Level of Risk/Frequency of Follow-up: moderate - high (continued poor PO intakes)   Chewing or Swallowing Difficulty?: No Chewing or swallowing difficulty  Estimated/Assessed Needs  RMR ([place]-St. Jeor Equation): 2017   Total Ve: 5.5 L/m Temp: 98.8 °F (37.1 °C)Oral  Weight Used For Calorie Calculations: 87.7 kg (193 lb 5.5 oz) (adjusted weight used)   Energy Need Method: Kcal/kg Energy Calorie Requirements (kcal): 0088-9803  Weight Used For Protein Calculations: 87.7 kg (193 lb 5.5 oz)  Protein  Requirements:        RDA Method (mL): 2192     Nutrition Prescription / Recommendations  Recommendation/Intervention: Recommend continue with Regular diet as ordered + Ensure Max Protein TID given pt with continued poor PO intakes (25-50% per flowsheets). Not adequate to meet nutritional needs. Wound noted per flowsheets, recommend addition of Charanjit BID to aid in wound healing. Consider addition of 500 mg Vit C BID + 220 mg ZnSO4 BID + mvi daily to aid in wound healing as well.  Goals: tolerance of diet, intake % + supplements, weight maintenance, wound healing  Nutrition Goal Status: progressing towards goal  Communication of RD Recs: discussed on rounds  Current Diet Order: Regular diet  Current Nutrition Support Formula Ordered: Jevity 1.5  Current Nutrition Support Rate Ordered: 42 (ml)  Current Nutrition Support Frequency Ordered: continuous  Oral Nutrition Supplement: Ensure Max Protein TID  Recommended Diet: Regular  Recommended Oral Supplement: Ensure Clear [240 kcals, 8g Protein, 52g Carbs(0g Fiber, 30g Sugar), 0g Fat] three times daily  Is Nutrition Support Recommended: No  Is Education Recommended: No  Monitor and Evaluation  % current Intake: P.O. intake of 25 - 50 %  % intake to meet estimated needs: 75 - 100 %  Food and Nutrient Intake: food and beverage intake  Food and Nutrient Adminstration: diet order  Anthropometric Measurements: weight, weight change, body mass index  Biochemical Data, Medical Tests and Procedures: electrolyte and renal panel, lipid profile, gastrointestinal profile, glucose/endocrine profile, inflammatory profile  Nutrition-Focused Physical Findings: overall appearance, extremities, muscles and bones, head and eyes, skin  Enteral Calories (kcal): 1632  Enteral Protein (gm): 94  Enteral (Free Water) Fluid (mL): 693  Free Water Flush Fluid (mL): 1440  Other Calories (kcal): 726  Total Calories (kcal): 2358  % Kcal Needs: 100  Total Protein (gm): 94  % Protein Needs:  100  Tolerance: tolerating  Current Medical Diagnosis and Past Medical History     Past Medical History:   Diagnosis Date    Hypertension     Mixed obsessional thoughts and acts 01/24/2017    Tourette syndrome     Tourette's syndrome 01/24/2017     Nutrition/Diet History  Spiritual, Cultural Beliefs, Adventist Practices, Values that Affect Care: no  Food Allergies: NKFA  Factors Affecting Nutritional Intake: None identified at this time  Lab/Procedures/Meds  Recent Labs   Lab 04/03/23  0457      K 3.2*   BUN 24*   CREATININE 1.27   *   CALCIUM 9.5   ALBUMIN 2.3*      ALT 57   AST 37     Note: Low K - replete to WNL as needed. Elevated BUN - pt noted to have EVELYN. Elevated glucose - no PMH of DM. Elevated alk phos. Low albumin - possibly related to wounds/poor PO intake. Elevated globulin.     Last A1c:   Lab Results   Component Value Date    HGBA1C 5.5 12/13/2021     Lab Results   Component Value Date    RBC 2.43 (L) 04/03/2023    HGB 8.2 (L) 04/03/2023    HCT 25.8 (L) 04/03/2023    .2 (H) 04/03/2023    MCH 33.7 (H) 04/03/2023    MCHC 31.8 (L) 04/03/2023     Pertinent Labs Reviewed: reviewed  Pertinent Medications Reviewed: reviewed  Pertinent Medications Comments: amlodipine, aspirin, clorazepate, divalproex, enoxaparin, escitalopram, fetanyl, hydralazine, lisinopril, pantoprazole    Anthropometrics  Temp: 98.8 °F (37.1 °C)  Height: 6' (182.9 cm)  Height (inches): 72 in  Weight Method: Bed Scale  Weight: 109.4 kg (241 lb 2.9 oz)  Weight (lb): 241.19 lb  Ideal Body Weight (IBW), Male: 178 lb  % Ideal Body Weight, Male (lb): 142.06 %  BMI (Calculated): 32.7  BMI Grade: 30 - 34.9- obesity - grade I     Nutrition by Nursing  Diet/Nutrition Received: regular  Intake (%): 25%     Diet/Feeding Tolerance: poor  Last Bowel Movement: 04/04/23  [REMOVED]      NG/OG Tube 03/23/23 0014 [place] sump Right nostril-Feeding Type: by pump  [REMOVED]      NG/OG Tube 03/23/23 0014 Josafat Carbajal  nostril-Current Rate (mL/hr): 42 mL/hr  [REMOVED]      NG/OG Tube 03/23/23 0014 [place] sump Right nostril-Goal Rate (mL/hr): 42 mL/hr  [REMOVED]      NG/OG Tube 03/23/23 0014 [place] sump Right nostril-Formula Name: isosource 1.5  Nutrition Follow-Up  RD Follow-up?: Yes

## 2023-04-04 NOTE — PLAN OF CARE
Problem: Infection  Goal: Absence of Infection Signs and Symptoms  Outcome: Ongoing, Progressing     Problem: Adult Inpatient Plan of Care  Goal: Plan of Care Review  Outcome: Ongoing, Progressing  Goal: Patient-Specific Goal (Individualized)  Outcome: Ongoing, Progressing  Goal: Absence of Hospital-Acquired Illness or Injury  Outcome: Ongoing, Progressing  Goal: Optimal Comfort and Wellbeing  Outcome: Ongoing, Progressing  Goal: Readiness for Transition of Care  Outcome: Ongoing, Progressing     Problem: Fluid and Electrolyte Imbalance (Acute Kidney Injury/Impairment)  Goal: Fluid and Electrolyte Balance  Outcome: Ongoing, Progressing     Problem: Oral Intake Inadequate (Acute Kidney Injury/Impairment)  Goal: Optimal Nutrition Intake  Outcome: Ongoing, Progressing     Problem: Renal Function Impairment (Acute Kidney Injury/Impairment)  Goal: Effective Renal Function  Outcome: Ongoing, Progressing     Problem: Skin Injury Risk Increased  Goal: Skin Health and Integrity  Outcome: Ongoing, Progressing     Problem: Fall Injury Risk  Goal: Absence of Fall and Fall-Related Injury  Outcome: Ongoing, Progressing     Problem: Impaired Wound Healing  Goal: Optimal Wound Healing  Outcome: Ongoing, Progressing     Problem: Breathing Pattern Ineffective  Goal: Effective Breathing Pattern  Outcome: Ongoing, Progressing     Problem: Gas Exchange Impaired  Goal: Optimal Gas Exchange  Outcome: Ongoing, Progressing

## 2023-04-04 NOTE — PT/OT/SLP PROGRESS
Physical Therapy Treatment    Patient Name:  Carol Ladd Jr.   MRN:  [id number]    Recommendations:     Discharge Recommendations: rehabilitation facility, substance abuse facility  Discharge Equipment Recommendations: none  Barriers to discharge: None    Assessment:     Carol Ladd Jr. is a 47 y.o. male admitted with a medical diagnosis of Alcohol withdrawal syndrome with complication.  He presents with the following impairments/functional limitations: impaired functional mobility, gait instability, impaired balance, decreased safety awareness Pt demonstrates improving mobility. He was able to ambulate without AD today. Mental status seems to be improving. Has some left knee pain from prior fall. No shortness of breath noted with ambulation. Pt ended PT treatment after ambulation. Will continue to increase safety with mobility as able    Rehab Prognosis: Good; patient would benefit from acute skilled PT services to address these deficits and reach maximum level of function.    Recent Surgery: * No surgery found *      Plan:     During this hospitalization, patient to be seen 5 x/week to address the identified rehab impairments via gait training, therapeutic activities, therapeutic exercises and progress toward the following goals:    Plan of Care Expires:  05/01/23    Subjective     Chief Complaint: AMS  Patient/Family Comments/goals: Pt is agreeable to PT  Pain/Comfort:  Pain Rating 1: 8/10  Location - Side 1: Left  Location 1: knee  Pain Addressed 1: Nurse notified  Pain Rating Post-Intervention 1: 8/10      Objective:     Communicated with RIVER Al RN prior to session.  Patient found up in chair with peripheral IV upon PT entry to room.     General Precautions: Standard, fall  Orthopedic Precautions: N/A  Braces: N/A  Respiratory Status: Room air     Functional Mobility:  Bed Mobility:     Scooting: independence  Sit to Supine: independence  Transfers:     Sit to Stand:  independence with no  AD  Bed to Chair: contact guard assistance with  no AD  using  Step Transfer  Gait: 400 ft, contact guard assistance, no AD, decreased stance time on LLE, antalgic gait  Balance: fair      AM-PAC 6 CLICK MOBILITY  Turning over in bed (including adjusting bedclothes, sheets and blankets)?: 4  Sitting down on and standing up from a chair with arms (e.g., wheelchair, bedside commode, etc.): 4  Moving from lying on back to sitting on the side of the bed?: 4  Moving to and from a bed to a chair (including a wheelchair)?: 3  Need to walk in hospital room?: 3  Climbing 3-5 steps with a railing?: 3  Basic Mobility Total Score: 21       Treatment & Education:  Pt performed bilateral LE: ankle pumps, Long arc quads, and Marching x 40 each  Ambulation in hallway as noted above    Patient left supine with all lines intact and call button in reach..    GOALS:   Multidisciplinary Problems       Physical Therapy Goals          Problem: Physical Therapy    Goal Priority Disciplines Outcome Goal Variances Interventions   Physical Therapy Goal     PT, PT/OT Ongoing, Progressing     Description: Short Term Goals to be met by: 4/15/23    Patient will increase functional independence with mobility by performin. Supine to sit with Stand by assist  2. Sit to stand transfer with Stand by assist using Rolling walker  3. Bed to chair transfer with Stand by assist using Rolling walker  4. Gait  x 100 feet with Stand by assist using Rolling walker  5. Lower extremity exercise program x30 reps per handout, with assistance as needed    Long Term Goals to be met by: 23    Pt will regain full independent functional mobility with lowest level of assistive device to return to home situation and prior activities of daily living.                        Time Tracking:     PT Received On: 23  PT Start Time: 1006     PT Stop Time: 1020  PT Total Time (min): 14 min     Billable Minutes: Therapeutic Exercise 14    Treatment Type:  Treatment  PT/PTA: PT     Number of PTA visits since last PT visit: 0     04/04/2023

## 2023-04-05 LAB
25(OH)D3 SERPL-MCNC: 15.6 NG/ML
AMMONIA PLAS-SCNC: <10 ΜMOL/L (ref 11–32)
ANION GAP SERPL CALCULATED.3IONS-SCNC: 10 MMOL/L (ref 7–16)
BASOPHILS # BLD AUTO: 0.08 K/UL (ref 0–0.2)
BASOPHILS NFR BLD AUTO: 0.4 % (ref 0–1)
BUN SERPL-MCNC: 23 MG/DL (ref 7–18)
BUN/CREAT SERPL: 17 (ref 6–20)
CALCIUM SERPL-MCNC: 9.8 MG/DL (ref 8.5–10.1)
CHLORIDE SERPL-SCNC: 101 MMOL/L (ref 98–107)
CO2 SERPL-SCNC: 31 MMOL/L (ref 21–32)
CREAT SERPL-MCNC: 1.32 MG/DL (ref 0.7–1.3)
CRP SERPL-MCNC: 15.3 MG/DL (ref 0–0.8)
DIFFERENTIAL METHOD BLD: ABNORMAL
EGFR (NO RACE VARIABLE) (RUSH/TITUS): 68 ML/MIN/1.73M²
EOSINOPHIL # BLD AUTO: 0.35 K/UL (ref 0–0.5)
EOSINOPHIL NFR BLD AUTO: 1.9 % (ref 1–4)
ERYTHROCYTE [DISTWIDTH] IN BLOOD BY AUTOMATED COUNT: 15.9 % (ref 11.5–14.5)
FOLATE SERPL-MCNC: 5.7 NG/ML (ref 3.1–17.5)
GLUCOSE SERPL-MCNC: 124 MG/DL (ref 74–106)
HCT VFR BLD AUTO: 28.8 % (ref 40–54)
HGB BLD-MCNC: 9.3 G/DL (ref 13.5–18)
IMM GRANULOCYTES # BLD AUTO: 0.42 K/UL (ref 0–0.04)
IMM GRANULOCYTES NFR BLD: 2.3 % (ref 0–0.4)
LYMPHOCYTES # BLD AUTO: 1.48 K/UL (ref 1–4.8)
LYMPHOCYTES NFR BLD AUTO: 8 % (ref 27–41)
MCH RBC QN AUTO: 33.2 PG (ref 27–31)
MCHC RBC AUTO-ENTMCNC: 32.3 G/DL (ref 32–36)
MCV RBC AUTO: 102.9 FL (ref 80–96)
MONOCYTES # BLD AUTO: 1.06 K/UL (ref 0–0.8)
MONOCYTES NFR BLD AUTO: 5.7 % (ref 2–6)
MPC BLD CALC-MCNC: 9.2 FL (ref 9.4–12.4)
NEUTROPHILS # BLD AUTO: 15.15 K/UL (ref 1.8–7.7)
NEUTROPHILS NFR BLD AUTO: 81.7 % (ref 53–65)
NRBC # BLD AUTO: 0 X10E3/UL
NRBC, AUTO (.00): 0 %
PLATELET # BLD AUTO: 885 K/UL (ref 150–400)
POTASSIUM SERPL-SCNC: 3.3 MMOL/L (ref 3.5–5.1)
RBC # BLD AUTO: 2.8 M/UL (ref 4.6–6.2)
SODIUM SERPL-SCNC: 139 MMOL/L (ref 136–145)
T4 SERPL-MCNC: 11.2 ΜG/DL (ref 4.5–12.1)
TSH SERPL DL<=0.005 MIU/L-ACNC: 1.17 UIU/ML (ref 0.36–3.74)
VIT B12 SERPL-MCNC: 329 PG/ML (ref 193–986)
WBC # BLD AUTO: 18.54 K/UL (ref 4.5–11)

## 2023-04-05 PROCEDURE — 85025 COMPLETE CBC W/AUTO DIFF WBC: CPT | Performed by: INTERNAL MEDICINE

## 2023-04-05 PROCEDURE — 84436 ASSAY OF TOTAL THYROXINE: CPT | Performed by: HOSPITALIST

## 2023-04-05 PROCEDURE — 82140 ASSAY OF AMMONIA: CPT | Performed by: HOSPITALIST

## 2023-04-05 PROCEDURE — 99900035 HC TECH TIME PER 15 MIN (STAT)

## 2023-04-05 PROCEDURE — 82746 ASSAY OF FOLIC ACID SERUM: CPT | Performed by: HOSPITALIST

## 2023-04-05 PROCEDURE — 25000003 PHARM REV CODE 250: Performed by: STUDENT IN AN ORGANIZED HEALTH CARE EDUCATION/TRAINING PROGRAM

## 2023-04-05 PROCEDURE — 25000003 PHARM REV CODE 250: Performed by: INTERNAL MEDICINE

## 2023-04-05 PROCEDURE — 25000003 PHARM REV CODE 250: Performed by: NURSE PRACTITIONER

## 2023-04-05 PROCEDURE — 63600175 PHARM REV CODE 636 W HCPCS: Performed by: NURSE PRACTITIONER

## 2023-04-05 PROCEDURE — 25000003 PHARM REV CODE 250: Performed by: HOSPITALIST

## 2023-04-05 PROCEDURE — 80048 BASIC METABOLIC PNL TOTAL CA: CPT | Performed by: INTERNAL MEDICINE

## 2023-04-05 PROCEDURE — 94761 N-INVAS EAR/PLS OXIMETRY MLT: CPT

## 2023-04-05 PROCEDURE — 86140 C-REACTIVE PROTEIN: CPT | Performed by: HOSPITALIST

## 2023-04-05 PROCEDURE — 99233 PR SUBSEQUENT HOSPITAL CARE,LEVL III: ICD-10-PCS | Mod: ,,, | Performed by: STUDENT IN AN ORGANIZED HEALTH CARE EDUCATION/TRAINING PROGRAM

## 2023-04-05 PROCEDURE — 11000001 HC ACUTE MED/SURG PRIVATE ROOM

## 2023-04-05 PROCEDURE — 84443 ASSAY THYROID STIM HORMONE: CPT | Performed by: HOSPITALIST

## 2023-04-05 PROCEDURE — 99233 SBSQ HOSP IP/OBS HIGH 50: CPT | Mod: ,,, | Performed by: STUDENT IN AN ORGANIZED HEALTH CARE EDUCATION/TRAINING PROGRAM

## 2023-04-05 PROCEDURE — 97110 THERAPEUTIC EXERCISES: CPT

## 2023-04-05 PROCEDURE — 27000221 HC OXYGEN, UP TO 24 HOURS

## 2023-04-05 PROCEDURE — 94660 CPAP INITIATION&MGMT: CPT

## 2023-04-05 PROCEDURE — 63600175 PHARM REV CODE 636 W HCPCS: Performed by: HOSPITALIST

## 2023-04-05 PROCEDURE — 82306 VITAMIN D 25 HYDROXY: CPT | Performed by: HOSPITALIST

## 2023-04-05 RX ORDER — LORAZEPAM 2 MG/ML
1 INJECTION INTRAMUSCULAR EVERY 4 HOURS PRN
Status: DISCONTINUED | OUTPATIENT
Start: 2023-04-05 | End: 2023-04-06

## 2023-04-05 RX ORDER — SODIUM CHLORIDE 9 MG/ML
INJECTION, SOLUTION INTRAVENOUS CONTINUOUS
Status: DISPENSED | OUTPATIENT
Start: 2023-04-05 | End: 2023-04-06

## 2023-04-05 RX ORDER — LOPERAMIDE HYDROCHLORIDE 2 MG/1
2 CAPSULE ORAL 2 TIMES DAILY
Status: COMPLETED | OUTPATIENT
Start: 2023-04-05 | End: 2023-04-05

## 2023-04-05 RX ORDER — LACTOBACILLUS ACIDOPHILUS 500MM CELL
4 CAPSULE ORAL
Status: DISCONTINUED | OUTPATIENT
Start: 2023-04-05 | End: 2023-04-10 | Stop reason: HOSPADM

## 2023-04-05 RX ADMIN — ASPIRIN 81 MG: 81 TABLET, DELAYED RELEASE ORAL at 08:04

## 2023-04-05 RX ADMIN — DIVALPROEX SODIUM 250 MG: 250 TABLET, DELAYED RELEASE ORAL at 01:04

## 2023-04-05 RX ADMIN — AMLODIPINE BESYLATE 5 MG: 5 TABLET ORAL at 08:04

## 2023-04-05 RX ADMIN — LORAZEPAM 1 MG: 2 INJECTION INTRAMUSCULAR; INTRAVENOUS at 08:04

## 2023-04-05 RX ADMIN — CLORAZEPATE DIPOTASSIUM 3.75 MG: 3.75 TABLET ORAL at 09:04

## 2023-04-05 RX ADMIN — LORAZEPAM 1 MG: 2 INJECTION INTRAMUSCULAR; INTRAVENOUS at 05:04

## 2023-04-05 RX ADMIN — HYDRALAZINE HYDROCHLORIDE 25 MG: 25 TABLET ORAL at 09:04

## 2023-04-05 RX ADMIN — Medication 4 CAPSULE: at 04:04

## 2023-04-05 RX ADMIN — LORAZEPAM 1 MG: 2 INJECTION INTRAMUSCULAR; INTRAVENOUS at 03:04

## 2023-04-05 RX ADMIN — LOPERAMIDE HYDROCHLORIDE 2 MG: 2 CAPSULE ORAL at 02:04

## 2023-04-05 RX ADMIN — SODIUM CHLORIDE: 9 INJECTION, SOLUTION INTRAVENOUS at 05:04

## 2023-04-05 RX ADMIN — Medication 2 CAPSULE: at 08:04

## 2023-04-05 RX ADMIN — HYDRALAZINE HYDROCHLORIDE 25 MG: 25 TABLET ORAL at 05:04

## 2023-04-05 RX ADMIN — CLORAZEPATE DIPOTASSIUM 3.75 MG: 3.75 TABLET ORAL at 08:04

## 2023-04-05 RX ADMIN — ESCITALOPRAM OXALATE 10 MG: 10 TABLET ORAL at 08:04

## 2023-04-05 RX ADMIN — DIVALPROEX SODIUM 250 MG: 250 TABLET, DELAYED RELEASE ORAL at 09:04

## 2023-04-05 RX ADMIN — PANTOPRAZOLE SODIUM 40 MG: 40 TABLET, DELAYED RELEASE ORAL at 08:04

## 2023-04-05 RX ADMIN — HYDRALAZINE HYDROCHLORIDE 25 MG: 25 TABLET ORAL at 01:04

## 2023-04-05 RX ADMIN — LISINOPRIL 20 MG: 20 TABLET ORAL at 08:04

## 2023-04-05 RX ADMIN — LOPERAMIDE HYDROCHLORIDE 2 MG: 2 CAPSULE ORAL at 09:04

## 2023-04-05 RX ADMIN — ENOXAPARIN SODIUM 40 MG: 100 INJECTION SUBCUTANEOUS at 04:04

## 2023-04-05 RX ADMIN — LORAZEPAM 1 MG: 2 INJECTION INTRAMUSCULAR; INTRAVENOUS at 01:04

## 2023-04-05 RX ADMIN — DIVALPROEX SODIUM 250 MG: 250 TABLET, DELAYED RELEASE ORAL at 05:04

## 2023-04-05 RX ADMIN — ACETAMINOPHEN 1000 MG: 500 TABLET ORAL at 04:04

## 2023-04-05 NOTE — HOSPITAL COURSE
04/04 - Records reviewed. Admitted from [facility] 03/21 after noted hypotension and altered mental status. There secondary EtoH abuse. Had abnoranl CXR. Was intubated for several days then HFNC. Since mental status back to baseline and on 2L BNC.   Past Medical History:   Diagnosis Date    Hypertension      Mixed obsessional thoughts and acts 01/24/2017    Tourette syndrome      Tourette's syndrome    -Also THOMAS. Wears CPAP but didn't bring with him to [facility]. Nurse staff tell me he is a big snorer and has apnea on floor droping sats while asleep. CXR better. Increase activity; told off balance.   Look into Dc needs.  4/5- still intermittently tachycardic.  Respiratory status improved. Doing well  4/6-denied by [facility], medically stable for discharge.  Can go once ride arranged    4/7-medically stable, after discussion with family decided to go to [facility]. Can DC once ride arrives    4/8- patient seen examined today sitting comfortably on the edge of the bed with no complaints.  During the patient's admission he is undergone echocardiogram which showed The left ventricle is normal in size with hyperdynamic systolic function.  The estimated ejection fraction is 75%.  Normal left ventricular diastolic function.  Mild right ventricular enlargement.  Mild right atrial enlargement.  Mild tricuspid regurgitation.  Elevated central venous pressure (15 mmHg).  The estimated PA systolic pressure is 46 mmHg.  There is pulmonary hypertension.  Sinus rhythm, 120.  Trivial pericardial effusion.    - EKG obtained on this visit shows sinus tachycardia without any other acute EKG changes.  - troponin was initially elevated on 03/25/2023 upon patient's admission during his acute withdrawal episode.    - has since proven to be stable from a cardiac standpoint, will a advise clinical cardiac follow-up secondary to extensive substance abuse.  4/9- patient seen examined resting comfortably in bed today, in no acute  distress with no acute events overnight.  Patient remains medically ready for discharge.  We are having trouble with displacement.  Rehab facility in [place] has denied patient's admission secondary to insurance.  Case management now checking with [facility] locally.    Mr. Ladd is not a danger to himself or others, no SI/HI or other thoughts of self harm.  He can safely be discharged home.  I believe he would benefit greatly from rehab and this can be pursued from home.

## 2023-04-05 NOTE — PT/OT/SLP PROGRESS
Physical Therapy      Patient Name:  Carol Ladd Jr.   MRN:  [id number]    Patient not seen today secondary to Patient unwilling to participate (Pt refused PT tx x2. pt states he has been walking in hallway and this was confirmed by RN. Also reports frequent diarrhea and does not want to get out of bed). Will follow-up 4/6/23.

## 2023-04-05 NOTE — PROGRESS NOTES
[facility] Medicine  Progress Note    Patient Name: Carol Ladd Jr.  MRN: [id number]  Patient Class: IP- Inpatient   Admission Date: 3/21/2023  Length of Stay: 15 days  Attending Physician: Tavo Woodard DO  Primary Care Provider: Raymundo Cabrera MD        Subjective:     Principal Problem:Alcohol withdrawal syndrome with complication        HPI:    Principal Problem: Alcohol withdrawal syndrome with complication     Subjective:      HPI:  48 y/o male who was transferred to [facility] for increased agitation from [place]. He was admitted there 48 hours ago for ETOH detox. He drinks over 1/5 of alcohol daily and has for several years to help with his tourette's.  Family states that he helps better than medication.  He has also been on Subaxone for several years as well.  His last drink for 03/19. ETOH level was over 400 at [facility].  On arrival to the ER his HR was 160 and he was diaphoretic.  Lactic acid was 10 on ABG and CK 3,333 and Cr 4.38. He was treated with 3 liters NS. He then became hypotensive and was started on Levophed.   On exam he is lethargic due to receiving 6 mg IV ativan but he states that his muscle spasm/contractions are similar to his tics but much worse.  He was started on several psych medications at [facility] but was not taking any at home.      PMH - HTN, ODD, tourette's, opoid abuse and ETOH abuse.                Past Medical History:   Diagnosis Date    Hypertension      Mixed obsessional thoughts and acts 01/24/2017    Tourette syndrome      Tourette's syndrome 01/24/2017         History reviewed. No pertinent surgical history.          Review of patient's allergies indicates:   Allergen Reactions    Haldol [haloperidol lactate]           Family History         Problem Relation (Age of Onset)     OCD Mother, Sister, Daughter     Tics Father, Paternal Uncle, Cousin                   Tobacco Use    Smoking status: Never    Smokeless  tobacco: Not on file   Substance and Sexual Activity    Alcohol use: Yes       Comment: reports drinking a 5th of alcohol/day    Drug use: Not Currently    Sexual activity: Not Currently           Overview/Hospital Course:  04/04 - Records reviewed. Admitted from [facility] 03/21 after noted hypotension and altered mental status. There secondary EtoH abuse. Had abnoranl CXR. Was intubated for several days then HFNC. Since mental status back to baseline and on 2L BNC.   Past Medical History:   Diagnosis Date    Hypertension      Mixed obsessional thoughts and acts 01/24/2017    Tourette syndrome      Tourette's syndrome    -Also THOMAS. Wears CPAP but didn't bring with him to [facility]. Nurse staff tell me he is a big snorer and has apnea on floor droping sats while asleep. CXR better. Increase activity; told off balance.   Look into Dc needs.  4/5- still intermittently tachycardic.  Respiratory status improved. Doing well      Interval History: naeo    Review of Systems   Constitutional:  Positive for fatigue. Negative for appetite change and fever.   HENT:  Negative for congestion, hearing loss and trouble swallowing.    Respiratory:  Negative for chest tightness and wheezing.    Cardiovascular:  Negative for chest pain and palpitations.   Gastrointestinal:  Negative for abdominal pain, constipation and nausea.   Genitourinary:  Negative for difficulty urinating and dysuria.   Musculoskeletal:  Negative for back pain and neck stiffness.   Skin:  Negative for pallor and rash.   Neurological:  Negative for dizziness, speech difficulty and headaches.   Psychiatric/Behavioral:  Positive for sleep disturbance. Negative for confusion and suicidal ideas.    Objective:     Vital Signs (Most Recent):  Temp: 98.2 °F (36.8 °C) (04/05/23 0700)  Pulse: (!) 118 (04/05/23 1000)  Resp: (!) 22 (04/05/23 1000)  BP: 125/77 (04/05/23 0700)  SpO2: (!) 93 % (04/05/23 1000)   Vital Signs (24h Range):  Temp:  [98.2 °F (36.8 °C)-98.8  °F (37.1 °C)] 98.2 °F (36.8 °C)  Pulse:  [101-119] 118  Resp:  [18-22] 22  SpO2:  [93 %-98 %] 93 %  BP: (125-156)/(77-88) 125/77     Weight: 109.4 kg (241 lb 2.9 oz)  Body mass index is 32.71 kg/m².    Intake/Output Summary (Last 24 hours) at 4/5/2023 1437  Last data filed at 4/4/2023 1732  Gross per 24 hour   Intake 720 ml   Output 320 ml   Net 400 ml      Physical Exam  Vitals reviewed.   Constitutional:       General: He is awake. He is not in acute distress.     Appearance: He is well-developed. He is obese. He is not toxic-appearing.   HENT:      Head: Normocephalic.      Nose: Nose normal.      Mouth/Throat:      Pharynx: Oropharynx is clear.   Eyes:      Extraocular Movements: Extraocular movements intact.      Pupils: Pupils are equal, round, and reactive to light.   Neck:      Thyroid: No thyroid mass.      Vascular: No carotid bruit.   Cardiovascular:      Rate and Rhythm: Normal rate and regular rhythm.      Pulses: Normal pulses.      Heart sounds: Normal heart sounds. No murmur heard.  Pulmonary:      Effort: Pulmonary effort is normal.      Breath sounds: Normal breath sounds and air entry. No wheezing.   Abdominal:      General: Bowel sounds are normal. There is no distension.      Palpations: Abdomen is soft.      Tenderness: There is no abdominal tenderness.   Musculoskeletal:         General: Normal range of motion.      Cervical back: Neck supple. No rigidity.   Skin:     General: Skin is warm.      Coloration: Skin is not jaundiced.      Findings: No lesion.   Neurological:      General: No focal deficit present.      Mental Status: He is alert and oriented to person, place, and time.      Cranial Nerves: No cranial nerve deficit.   Psychiatric:         Attention and Perception: Attention normal.         Mood and Affect: Mood normal.         Behavior: Behavior normal. Behavior is cooperative.         Thought Content: Thought content normal.         Cognition and Memory: Cognition normal.        Significant Labs: All pertinent labs within the past 24 hours have been reviewed.    Significant Imaging: I have reviewed all pertinent imaging results/findings within the past 24 hours.      Assessment/Plan:      Obstructive sleep apnea    Restart CPAP. He is to have family bring his from home    Depression  Patient has persistent depression which is moderate and is currently uncontrolled. Will Continue anti-depressant medications. We will not consult psychiatry at this time. Patient does not display psychosis at this time. Continue to monitor closely and adjust plan of care as needed.    4/4 at discharge to go back to [place]        HTN (hypertension)    Adjust meds    Acute kidney injury  Patient with acute kidney injury likely due to pre-renal azotemia EVELYN is currently improving. Labs reviewed- Renal function/electrolytes with Estimated Creatinine Clearance: 90.3 mL/min (A) (based on SCr of 1.32 mg/dL (H)). according to latest data. Monitor urine output and serial BMP and adjust therapy as needed. Avoid nephrotoxins and renally dose meds for GFR listed above.     04/04 resolved    4/5- weaning oxygen, can return to [place] soon    Tourette syndrome          VTE Risk Mitigation (From admission, onward)         Ordered     enoxaparin injection 40 mg  Daily         03/26/23 4445                Discharge Planning   JEZ:      Code Status: Not on file   Is the patient medically ready for discharge?:     Reason for patient still in hospital (select all that apply): Treatment  Discharge Plan A: Rehab                  Tavo Woodard DO  Department of Hospital Medicine   [facility]

## 2023-04-05 NOTE — ASSESSMENT & PLAN NOTE
Patient has persistent depression which is moderate and is currently uncontrolled. Will Continue anti-depressant medications. We will not consult psychiatry at this time. Patient does not display psychosis at this time. Continue to monitor closely and adjust plan of care as needed.    4/4 at discharge to go back to [place]

## 2023-04-05 NOTE — ASSESSMENT & PLAN NOTE
Patient with acute kidney injury likely due to pre-renal azotemia EVELYN is currently improving. Labs reviewed- Renal function/electrolytes with Estimated Creatinine Clearance: 93.8 mL/min (based on SCr of 1.27 mg/dL). according to latest data. Monitor urine output and serial BMP and adjust therapy as needed. Avoid nephrotoxins and renally dose meds for GFR listed above.     04/04 resolved

## 2023-04-05 NOTE — PROGRESS NOTES
[facility] Medicine  Progress Note    Patient Name: Carol Ladd Jr.  MRN: [id number]  Patient Class: IP- Inpatient   Admission Date: 3/21/2023  Length of Stay: 14 days  Attending Physician: Kodak Givens MD  Primary Care Provider: Raymundo Cabrera MD        Subjective:     Principal Problem:Alcohol withdrawal syndrome with complication        HPI:    Principal Problem: Alcohol withdrawal syndrome with complication     Subjective:      HPI:  46 y/o male who was transferred to [facility] for increased agitation from [place]. He was admitted there 48 hours ago for ETOH detox. He drinks over 1/5 of alcohol daily and has for several years to help with his tourette's.  Family states that he helps better than medication.  He has also been on Subaxone for several years as well.  His last drink for 03/19. ETOH level was over 400 at [facility].  On arrival to the ER his HR was 160 and he was diaphoretic.  Lactic acid was 10 on ABG and CK 3,333 and Cr 4.38. He was treated with 3 liters NS. He then became hypotensive and was started on Levophed.   On exam he is lethargic due to receiving 6 mg IV ativan but he states that his muscle spasm/contractions are similar to his tics but much worse.  He was started on several psych medications at [facility] but was not taking any at home.      PMH - HTN, ODD, tourette's, opoid abuse and ETOH abuse.                Past Medical History:   Diagnosis Date    Hypertension      Mixed obsessional thoughts and acts 01/24/2017    Tourette syndrome      Tourette's syndrome 01/24/2017         History reviewed. No pertinent surgical history.          Review of patient's allergies indicates:   Allergen Reactions    Haldol [haloperidol lactate]           Family History         Problem Relation (Age of Onset)     OCD Mother, Sister, Daughter     Tics Father, Paternal Uncle, Cousin                   Tobacco Use    Smoking status: Never    Smokeless tobacco:  Not on file   Substance and Sexual Activity    Alcohol use: Yes       Comment: reports drinking a 5th of alcohol/day    Drug use: Not Currently    Sexual activity: Not Currently           Overview/Hospital Course:  04/04 - Records reviewed. Admitted from [facility] 03/21 after noted hypotension and altered mental status. There secondary EtoH abuse. Had abnoranl CXR. Was intubated for several days then HFNC. Since mental status back to baseline and on 2L BNC.   Past Medical History:   Diagnosis Date    Hypertension      Mixed obsessional thoughts and acts 01/24/2017    Tourette syndrome      Tourette's syndrome    -Also THOMAS. Wears CPAP but didn't bring with him to [facility]. Nurse staff tell me he is a big snorer and has apnea on floor droping sats while asleep. CXR better. Increase activity; told off balance.   Look into Dc needs.      Interval History:     Review of Systems   Constitutional:  Positive for fatigue. Negative for appetite change and fever.   HENT:  Negative for congestion, hearing loss and trouble swallowing.    Respiratory:  Positive for shortness of breath. Negative for chest tightness and wheezing.    Cardiovascular:  Negative for chest pain and palpitations.   Gastrointestinal:  Negative for abdominal pain, constipation and nausea. Some loose stools longstanding. Does not drink milk.  Genitourinary:  Negative for difficulty urinating and dysuria.   Musculoskeletal:  Positive for gait problem. Negative for back pain and neck stiffness.   Skin:  Negative for pallor and rash.   Neurological:  Negative for dizziness, speech difficulty and headaches.   Psychiatric/Behavioral:  Positive for sleep disturbance. Negative for confusion and suicidal ideas.    Objective:     Vital Signs (Most Recent):  Temp: 98.7 °F (37.1 °C) (04/04/23 1930)  Pulse: (!) 119 (04/04/23 1930)  Resp: 18 (04/04/23 1930)  BP: 137/80 (04/04/23 1930)  SpO2: 98 % (04/04/23 2051)   Vital Signs (24h Range):  Temp:  [97.9 °F (36.6  °C)-98.8 °F (37.1 °C)] 98.7 °F (37.1 °C)  Pulse:  [104-119] 119  Resp:  [18-22] 18  SpO2:  [95 %-100 %] 98 %  BP: (110-157)/(73-80) 137/80     Weight: 109.4 kg (241 lb 2.9 oz)  Body mass index is 32.71 kg/m².    Intake/Output Summary (Last 24 hours) at 4/4/2023 2313  Last data filed at 4/4/2023 1732  Gross per 24 hour   Intake 1000 ml   Output 320 ml   Net 680 ml      Physical Exam  Vitals reviewed.   Constitutional:       General: He is awake. He is not in acute distress.     Appearance: He is well-developed. He is obese. He is not toxic-appearing.   HENT:      Head: Normocephalic.      Nose: Nose normal.      Mouth/Throat:      Pharynx: Oropharynx is clear.   Eyes:      Extraocular Movements: Extraocular movements intact.      Pupils: Pupils are equal, round, and reactive to light.   Neck:      Thyroid: No thyroid mass.      Vascular: No carotid bruit.   Cardiovascular:      Rate and Rhythm: Normal rate and regular rhythm.      Pulses: Normal pulses.      Heart sounds: Normal heart sounds. No murmur heard.  Pulmonary:      Effort: Pulmonary effort is normal.      Breath sounds: Normal breath sounds and air entry. No wheezing.   Abdominal:      General: Bowel sounds are normal. There is no distension.      Palpations: Abdomen is soft.      Tenderness: There is no abdominal tenderness.   Musculoskeletal:         General: Normal range of motion.      Cervical back: Neck supple. No rigidity.   Skin:     General: Skin is warm.      Coloration: Skin is not jaundiced.      Findings: No lesion.   Neurological:      General: No focal deficit present.      Mental Status: He is alert and oriented to person, place, and time.      Cranial Nerves: No cranial nerve deficit.   Psychiatric:         Attention and Perception: Attention normal.         Mood and Affect: Mood normal.         Behavior: Behavior normal. Behavior is cooperative.         Thought Content: Thought content normal.         Cognition and Memory: Cognition  normal.       Significant Labs: All pertinent labs within the past 24 hours have been reviewed.  BMP:   Recent Labs   Lab 04/03/23 0457   *      K 3.2*      CO2 30   BUN 24*   CREATININE 1.27   CALCIUM 9.5     CBC:   Recent Labs   Lab 04/03/23 0457   WBC 18.05*   HGB 8.2*   HCT 25.8*   *     CMP:   Recent Labs   Lab 04/03/23 0457      K 3.2*      CO2 30   *   BUN 24*   CREATININE 1.27   CALCIUM 9.5   PROT 7.8   ALBUMIN 2.3*   BILITOT 0.9   ALKPHOS 136*   AST 37   ALT 57   ANIONGAP 13       Significant Imaging: I have reviewed all pertinent imaging results/findings within the past 24 hours.      Assessment/Plan:      Obstructive sleep apnea    Restart CPAP. He is to have family bring his from home    Depression  Patient has persistent depression which is moderate and is currently uncontrolled. Will Continue anti-depressant medications. We will not consult psychiatry at this time. Patient does not display psychosis at this time. Continue to monitor closely and adjust plan of care as needed.    4/4 at discharge to go back to [place]        HTN (hypertension)    Adjust meds    Acute kidney injury  Patient with acute kidney injury likely due to pre-renal azotemia EVELYN is currently improving. Labs reviewed- Renal function/electrolytes with Estimated Creatinine Clearance: 93.8 mL/min (based on SCr of 1.27 mg/dL). according to latest data. Monitor urine output and serial BMP and adjust therapy as needed. Avoid nephrotoxins and renally dose meds for GFR listed above.     04/04 resolved      Tourette syndrome          VTE Risk Mitigation (From admission, onward)           Ordered     enoxaparin injection 40 mg  Daily         03/26/23 7962                    Discharge Planning   JEZ:      Code Status: Not on file   Is the patient medically ready for discharge?:     Reason for patient still in hospital (select all that apply): Laboratory test, Treatment, Imaging and PT / OT  recommendations  Discharge Plan A: Rehab                  Kodak Givens MD  Department of Hospital Medicine   [facility]

## 2023-04-05 NOTE — PLAN OF CARE
Problem: Infection  Goal: Absence of Infection Signs and Symptoms  Outcome: Ongoing, Progressing  Intervention: Prevent or Manage Infection  Flowsheets (Taken 4/5/2023 0110)  Fever Reduction/Comfort Measures:   lightweight clothing   lightweight bedding     Problem: Adult Inpatient Plan of Care  Goal: Plan of Care Review  Outcome: Ongoing, Progressing  Flowsheets (Taken 4/5/2023 0110)  Plan of Care Reviewed With: patient  Goal: Patient-Specific Goal (Individualized)  Outcome: Ongoing, Progressing  Flowsheets (Taken 4/5/2023 0110)  Anxieties, Fears or Concerns: none  Individualized Care Needs: none  Goal: Absence of Hospital-Acquired Illness or Injury  Outcome: Ongoing, Progressing  Intervention: Identify and Manage Fall Risk  Flowsheets (Taken 4/5/2023 0110)  Safety Promotion/Fall Prevention: assistive device/personal item within reach  Intervention: Prevent Skin Injury  Flowsheets (Taken 4/5/2023 0110)  Body Position: position changed independently  Skin Protection: adhesive use limited  Intervention: Prevent and Manage VTE (Venous Thromboembolism) Risk  Flowsheets (Taken 4/5/2023 0110)  Activity Management: Arm raise - L1  VTE Prevention/Management: remove, assess skin, and reapply sequential compression device  Range of Motion: active ROM (range of motion) encouraged  Intervention: Prevent Infection  Flowsheets (Taken 4/5/2023 0110)  Infection Prevention: rest/sleep promoted  Goal: Optimal Comfort and Wellbeing  Outcome: Ongoing, Progressing  Intervention: Monitor Pain and Promote Comfort  Flowsheets (Taken 4/5/2023 0110)  Pain Management Interventions: care clustered  Intervention: Provide Person-Centered Care  Flowsheets (Taken 4/5/2023 0110)  Trust Relationship/Rapport: care explained  Goal: Readiness for Transition of Care  Outcome: Ongoing, Progressing     Problem: Fluid and Electrolyte Imbalance (Acute Kidney Injury/Impairment)  Goal: Fluid and Electrolyte Balance  Outcome: Ongoing, Progressing      Problem: Oral Intake Inadequate (Acute Kidney Injury/Impairment)  Goal: Optimal Nutrition Intake  Outcome: Ongoing, Progressing     Problem: Renal Function Impairment (Acute Kidney Injury/Impairment)  Goal: Effective Renal Function  Outcome: Ongoing, Progressing     Problem: Skin Injury Risk Increased  Goal: Skin Health and Integrity  Outcome: Ongoing, Progressing     Problem: Fall Injury Risk  Goal: Absence of Fall and Fall-Related Injury  Outcome: Ongoing, Progressing     Problem: Impaired Wound Healing  Goal: Optimal Wound Healing  Outcome: Ongoing, Progressing     Problem: Breathing Pattern Ineffective  Goal: Effective Breathing Pattern  Outcome: Ongoing, Progressing     Problem: Gas Exchange Impaired  Goal: Optimal Gas Exchange  Outcome: Ongoing, Progressing

## 2023-04-05 NOTE — PT/OT/SLP PROGRESS
Occupational Therapy   Treatment    Name: Carol Ladd Jr.  MRN: [id number]  Admitting Diagnosis:  Alcohol withdrawal syndrome with complication       Recommendations:     Discharge Recommendations: rehabilitation facility, psychiatric facility, substance abuse facility  Discharge Equipment Recommendations:  none  Barriers to discharge:  None    Assessment:     Carol Ladd Jr. is a 47 y.o. male with a medical diagnosis of Alcohol withdrawal syndrome with complication.  He presents with alert, c/o not feeling well today and wanting to be able to sleep. Performance deficits affecting function are impaired functional mobility, impaired endurance, impaired self care skills, decreased safety awareness.     Rehab Prognosis:  Good; patient would benefit from acute skilled OT services to address these deficits and reach maximum level of function.       Plan:     Patient to be seen 5 x/week to address the above listed problems via self-care/home management, therapeutic activities, therapeutic exercises  Plan of Care Expires: 04/28/23  Plan of Care Reviewed with: patient    Subjective     Chief Complaint: Pt c/o having these nervous episodes where he gets feeling hot and jittery and anxious and it just seems to escalate until he gets his medication, then he sleeps for about 1 hour. When he wakes up, the whole cycle starts over.  Patient/Family Comments/goals: Pt wants to be able to sleep well and for multiple hours at a time  Pain/Comfort:  Pain Rating 1: 3/10  Location - Side 1: Left  Location 1: knee  Pain Addressed 1: Nurse notified, Cessation of Activity  Pain Rating Post-Intervention 1: 5/10    Objective:     Communicated with: ANYI Boudreaux prior to session.  Patient found HOB elevated with peripheral IV upon OT entry to room.    General Precautions: Standard, fall    Orthopedic Precautions:N/A  Braces: N/A  Respiratory Status: Nasal cannula, flow 3 L/min     Occupational Performance:     Bed Mobility:     Patient completed Scooting/Bridging with independence  Patient completed Supine to Sit with independence  Patient completed Sit to Supine with independence     Functional Mobility/Transfers:  Patient completed Sit <> Stand Transfer with independence  with  no assistive device   Functional Mobility: Pt ambulated up hallway with SBA to increase activity tolerance with 3L/m O2 via NC. Pt was limited by knee pain.    Activities of Daily Living:  NT      [facility] 6 Click ADL:      Treatment & Education:  Pt performed (B) UE ex with 3# db from EOB for 2x15 reps of each:  Elbow flexion  Shoulder flexion  Punches  Horizontal abd/adduction  Trunk twists (8 reps)    Pt stood and performed (B) shoulder diagonals with 3# db 2x10 reps, large diagonals with (B) hands clasping 3# db 2x10 reps.      Patient left HOB elevated with all lines intact, call button in reach, and RN Kanika notified    GOALS:   Multidisciplinary Problems       Occupational Therapy Goals          Problem: Occupational Therapy    Goal Priority Disciplines Outcome Interventions   Occupational Therapy Goal     OT, PT/OT Ongoing, Progressing    Description: STG:  Pt will perform grooming with setup  Pt will bathe with setup and CGA  Pt will perform UE dressing with setup  Pt will perform LE dressing with setup and min(A)  Pt will sit EOB x 10 min with independence during dynamic activity  Pt will transfer bed/chair/bsc with CGA  Pt will perform standing task x 3 min with SBA  Pt will tolerate 20 minutes of tx without fatigue      LT.Restore to max I with self care and mobility.                          Time Tracking:     OT Date of Treatment: 23  OT Start Time: 1545  OT Stop Time: 1605  OT Total Time (min): 20 min    Billable Minutes:Therapeutic Exercise 20 min    OT/IRENE: OT          2023

## 2023-04-05 NOTE — SUBJECTIVE & OBJECTIVE
Interval History: naeo    Review of Systems   Constitutional:  Positive for fatigue. Negative for appetite change and fever.   HENT:  Negative for congestion, hearing loss and trouble swallowing.    Respiratory:  Negative for chest tightness and wheezing.    Cardiovascular:  Negative for chest pain and palpitations.   Gastrointestinal:  Negative for abdominal pain, constipation and nausea.   Genitourinary:  Negative for difficulty urinating and dysuria.   Musculoskeletal:  Negative for back pain and neck stiffness.   Skin:  Negative for pallor and rash.   Neurological:  Negative for dizziness, speech difficulty and headaches.   Psychiatric/Behavioral:  Positive for sleep disturbance. Negative for confusion and suicidal ideas.    Objective:     Vital Signs (Most Recent):  Temp: 98.2 °F (36.8 °C) (04/05/23 0700)  Pulse: (!) 118 (04/05/23 1000)  Resp: (!) 22 (04/05/23 1000)  BP: 125/77 (04/05/23 0700)  SpO2: (!) 93 % (04/05/23 1000)   Vital Signs (24h Range):  Temp:  [98.2 °F (36.8 °C)-98.8 °F (37.1 °C)] 98.2 °F (36.8 °C)  Pulse:  [101-119] 118  Resp:  [18-22] 22  SpO2:  [93 %-98 %] 93 %  BP: (125-156)/(77-88) 125/77     Weight: 109.4 kg (241 lb 2.9 oz)  Body mass index is 32.71 kg/m².    Intake/Output Summary (Last 24 hours) at 4/5/2023 1437  Last data filed at 4/4/2023 1732  Gross per 24 hour   Intake 720 ml   Output 320 ml   Net 400 ml      Physical Exam  Vitals reviewed.   Constitutional:       General: He is awake. He is not in acute distress.     Appearance: He is well-developed. He is obese. He is not toxic-appearing.   HENT:      Head: Normocephalic.      Nose: Nose normal.      Mouth/Throat:      Pharynx: Oropharynx is clear.   Eyes:      Extraocular Movements: Extraocular movements intact.      Pupils: Pupils are equal, round, and reactive to light.   Neck:      Thyroid: No thyroid mass.      Vascular: No carotid bruit.   Cardiovascular:      Rate and Rhythm: Normal rate and regular rhythm.      Pulses:  Normal pulses.      Heart sounds: Normal heart sounds. No murmur heard.  Pulmonary:      Effort: Pulmonary effort is normal.      Breath sounds: Normal breath sounds and air entry. No wheezing.   Abdominal:      General: Bowel sounds are normal. There is no distension.      Palpations: Abdomen is soft.      Tenderness: There is no abdominal tenderness.   Musculoskeletal:         General: Normal range of motion.      Cervical back: Neck supple. No rigidity.   Skin:     General: Skin is warm.      Coloration: Skin is not jaundiced.      Findings: No lesion.   Neurological:      General: No focal deficit present.      Mental Status: He is alert and oriented to person, place, and time.      Cranial Nerves: No cranial nerve deficit.   Psychiatric:         Attention and Perception: Attention normal.         Mood and Affect: Mood normal.         Behavior: Behavior normal. Behavior is cooperative.         Thought Content: Thought content normal.         Cognition and Memory: Cognition normal.       Significant Labs: All pertinent labs within the past 24 hours have been reviewed.    Significant Imaging: I have reviewed all pertinent imaging results/findings within the past 24 hours.

## 2023-04-05 NOTE — ASSESSMENT & PLAN NOTE
Patient has persistent depression which is moderate and is currently uncontrolled. Will Continue anti-depressant medications. We will not consult psychiatry at this time. Patient does not display psychosis at this time. Continue to monitor closely and adjust plan of care as needed.    4/4 at discharge to go back to [place]

## 2023-04-05 NOTE — SUBJECTIVE & OBJECTIVE
Interval History:     Review of Systems   Constitutional:  Positive for fatigue. Negative for appetite change and fever.   HENT:  Negative for congestion, hearing loss and trouble swallowing.    Respiratory:  Positive for shortness of breath. Negative for chest tightness and wheezing.    Cardiovascular:  Negative for chest pain and palpitations.   Gastrointestinal:  Negative for abdominal pain, constipation and nausea.   Genitourinary:  Negative for difficulty urinating and dysuria.   Musculoskeletal:  Positive for gait problem. Negative for back pain and neck stiffness.   Skin:  Negative for pallor and rash.   Neurological:  Negative for dizziness, speech difficulty and headaches.   Psychiatric/Behavioral:  Positive for sleep disturbance. Negative for confusion and suicidal ideas.    Objective:     Vital Signs (Most Recent):  Temp: 98.7 °F (37.1 °C) (04/04/23 1930)  Pulse: (!) 119 (04/04/23 1930)  Resp: 18 (04/04/23 1930)  BP: 137/80 (04/04/23 1930)  SpO2: 98 % (04/04/23 2051)   Vital Signs (24h Range):  Temp:  [97.9 °F (36.6 °C)-98.8 °F (37.1 °C)] 98.7 °F (37.1 °C)  Pulse:  [104-119] 119  Resp:  [18-22] 18  SpO2:  [95 %-100 %] 98 %  BP: (110-157)/(73-80) 137/80     Weight: 109.4 kg (241 lb 2.9 oz)  Body mass index is 32.71 kg/m².    Intake/Output Summary (Last 24 hours) at 4/4/2023 2313  Last data filed at 4/4/2023 1732  Gross per 24 hour   Intake 1000 ml   Output 320 ml   Net 680 ml      Physical Exam  Vitals reviewed.   Constitutional:       General: He is awake. He is not in acute distress.     Appearance: He is well-developed. He is obese. He is not toxic-appearing.   HENT:      Head: Normocephalic.      Nose: Nose normal.      Mouth/Throat:      Pharynx: Oropharynx is clear.   Eyes:      Extraocular Movements: Extraocular movements intact.      Pupils: Pupils are equal, round, and reactive to light.   Neck:      Thyroid: No thyroid mass.      Vascular: No carotid bruit.   Cardiovascular:      Rate and Rhythm:  Normal rate and regular rhythm.      Pulses: Normal pulses.      Heart sounds: Normal heart sounds. No murmur heard.  Pulmonary:      Effort: Pulmonary effort is normal.      Breath sounds: Normal breath sounds and air entry. No wheezing.   Abdominal:      General: Bowel sounds are normal. There is no distension.      Palpations: Abdomen is soft.      Tenderness: There is no abdominal tenderness.   Musculoskeletal:         General: Normal range of motion.      Cervical back: Neck supple. No rigidity.   Skin:     General: Skin is warm.      Coloration: Skin is not jaundiced.      Findings: No lesion.   Neurological:      General: No focal deficit present.      Mental Status: He is alert and oriented to person, place, and time.      Cranial Nerves: No cranial nerve deficit.   Psychiatric:         Attention and Perception: Attention normal.         Mood and Affect: Mood normal.         Behavior: Behavior normal. Behavior is cooperative.         Thought Content: Thought content normal.         Cognition and Memory: Cognition normal.       Significant Labs: All pertinent labs within the past 24 hours have been reviewed.  BMP:   Recent Labs   Lab 04/03/23 0457   *      K 3.2*      CO2 30   BUN 24*   CREATININE 1.27   CALCIUM 9.5     CBC:   Recent Labs   Lab 04/03/23 0457   WBC 18.05*   HGB 8.2*   HCT 25.8*   *     CMP:   Recent Labs   Lab 04/03/23 0457      K 3.2*      CO2 30   *   BUN 24*   CREATININE 1.27   CALCIUM 9.5   PROT 7.8   ALBUMIN 2.3*   BILITOT 0.9   ALKPHOS 136*   AST 37   ALT 57   ANIONGAP 13       Significant Imaging: I have reviewed all pertinent imaging results/findings within the past 24 hours.

## 2023-04-05 NOTE — ASSESSMENT & PLAN NOTE
Patient with acute kidney injury likely due to pre-renal azotemia EVELYN is currently improving. Labs reviewed- Renal function/electrolytes with Estimated Creatinine Clearance: 90.3 mL/min (A) (based on SCr of 1.32 mg/dL (H)). according to latest data. Monitor urine output and serial BMP and adjust therapy as needed. Avoid nephrotoxins and renally dose meds for GFR listed above.     04/04 resolved    4/5- weaning oxygen, can return to [facility] soon

## 2023-04-05 NOTE — HPI
Principal Problem: Alcohol withdrawal syndrome with complication     Subjective:      HPI:  48 y/o male who was transferred to [facility] for increased agitation from [place]. He was admitted there 48 hours ago for ETOH detox. He drinks over 1/5 of alcohol daily and has for several years to help with his tourette's.  Family states that he helps better than medication.  He has also been on Subaxone for several years as well.  His last drink for 03/19. ETOH level was over 400 at [facility].  On arrival to the ER his HR was 160 and he was diaphoretic.  Lactic acid was 10 on ABG and CK 3,333 and Cr 4.38. He was treated with 3 liters NS. He then became hypotensive and was started on Levophed.   On exam he is lethargic due to receiving 6 mg IV ativan but he states that his muscle spasm/contractions are similar to his tics but much worse.  He was started on several psych medications at [facility] but was not taking any at home.      PMH - HTN, ODD, tourette's, opoid abuse and ETOH abuse.                Past Medical History:   Diagnosis Date    Hypertension      Mixed obsessional thoughts and acts 01/24/2017    Tourette syndrome      Tourette's syndrome 01/24/2017         History reviewed. No pertinent surgical history.          Review of patient's allergies indicates:   Allergen Reactions    Haldol [haloperidol lactate]           Family History         Problem Relation (Age of Onset)     OCD Mother, Sister, Daughter     Tics Father, Paternal Uncle, Cousin                   Tobacco Use    Smoking status: Never    Smokeless tobacco: Not on file   Substance and Sexual Activity    Alcohol use: Yes       Comment: reports drinking a 5th of alcohol/day    Drug use: Not Currently    Sexual activity: Not Currently

## 2023-04-06 PROCEDURE — 99900035 HC TECH TIME PER 15 MIN (STAT)

## 2023-04-06 PROCEDURE — 25000003 PHARM REV CODE 250: Performed by: HOSPITALIST

## 2023-04-06 PROCEDURE — 27000221 HC OXYGEN, UP TO 24 HOURS

## 2023-04-06 PROCEDURE — 94761 N-INVAS EAR/PLS OXIMETRY MLT: CPT

## 2023-04-06 PROCEDURE — 25000003 PHARM REV CODE 250: Performed by: INTERNAL MEDICINE

## 2023-04-06 PROCEDURE — 63600175 PHARM REV CODE 636 W HCPCS: Performed by: NURSE PRACTITIONER

## 2023-04-06 PROCEDURE — 11000001 HC ACUTE MED/SURG PRIVATE ROOM

## 2023-04-06 PROCEDURE — 25000003 PHARM REV CODE 250: Performed by: STUDENT IN AN ORGANIZED HEALTH CARE EDUCATION/TRAINING PROGRAM

## 2023-04-06 PROCEDURE — 25000003 PHARM REV CODE 250: Performed by: NURSE PRACTITIONER

## 2023-04-06 PROCEDURE — 99232 PR SUBSEQUENT HOSPITAL CARE,LEVL II: ICD-10-PCS | Mod: ,,, | Performed by: STUDENT IN AN ORGANIZED HEALTH CARE EDUCATION/TRAINING PROGRAM

## 2023-04-06 PROCEDURE — 63600175 PHARM REV CODE 636 W HCPCS: Performed by: HOSPITALIST

## 2023-04-06 PROCEDURE — 97110 THERAPEUTIC EXERCISES: CPT

## 2023-04-06 PROCEDURE — 99232 SBSQ HOSP IP/OBS MODERATE 35: CPT | Mod: ,,, | Performed by: STUDENT IN AN ORGANIZED HEALTH CARE EDUCATION/TRAINING PROGRAM

## 2023-04-06 RX ORDER — ALPRAZOLAM 0.5 MG/1
0.5 TABLET ORAL 3 TIMES DAILY PRN
Status: DISCONTINUED | OUTPATIENT
Start: 2023-04-06 | End: 2023-04-10 | Stop reason: HOSPADM

## 2023-04-06 RX ORDER — POTASSIUM CHLORIDE 20 MEQ/1
40 TABLET, EXTENDED RELEASE ORAL ONCE
Status: COMPLETED | OUTPATIENT
Start: 2023-04-06 | End: 2023-04-06

## 2023-04-06 RX ADMIN — Medication 4 CAPSULE: at 11:04

## 2023-04-06 RX ADMIN — AMLODIPINE BESYLATE 5 MG: 5 TABLET ORAL at 08:04

## 2023-04-06 RX ADMIN — POTASSIUM CHLORIDE 40 MEQ: 1500 TABLET, EXTENDED RELEASE ORAL at 08:04

## 2023-04-06 RX ADMIN — HYDRALAZINE HYDROCHLORIDE 25 MG: 25 TABLET ORAL at 01:04

## 2023-04-06 RX ADMIN — HYDRALAZINE HYDROCHLORIDE 25 MG: 25 TABLET ORAL at 08:04

## 2023-04-06 RX ADMIN — SODIUM CHLORIDE: 9 INJECTION, SOLUTION INTRAVENOUS at 12:04

## 2023-04-06 RX ADMIN — Medication 4 CAPSULE: at 08:04

## 2023-04-06 RX ADMIN — CLORAZEPATE DIPOTASSIUM 3.75 MG: 3.75 TABLET ORAL at 08:04

## 2023-04-06 RX ADMIN — ASPIRIN 81 MG: 81 TABLET, DELAYED RELEASE ORAL at 08:04

## 2023-04-06 RX ADMIN — ENOXAPARIN SODIUM 40 MG: 100 INJECTION SUBCUTANEOUS at 04:04

## 2023-04-06 RX ADMIN — HYDRALAZINE HYDROCHLORIDE 25 MG: 25 TABLET ORAL at 05:04

## 2023-04-06 RX ADMIN — LORAZEPAM 1 MG: 2 INJECTION INTRAMUSCULAR; INTRAVENOUS at 05:04

## 2023-04-06 RX ADMIN — ACETAMINOPHEN 1000 MG: 500 TABLET ORAL at 04:04

## 2023-04-06 RX ADMIN — ALPRAZOLAM 0.5 MG: 0.5 TABLET ORAL at 08:04

## 2023-04-06 RX ADMIN — DIVALPROEX SODIUM 250 MG: 250 TABLET, DELAYED RELEASE ORAL at 01:04

## 2023-04-06 RX ADMIN — Medication 4 CAPSULE: at 05:04

## 2023-04-06 RX ADMIN — ALPRAZOLAM 0.5 MG: 0.5 TABLET ORAL at 01:04

## 2023-04-06 RX ADMIN — ESCITALOPRAM OXALATE 10 MG: 10 TABLET ORAL at 08:04

## 2023-04-06 RX ADMIN — LISINOPRIL 20 MG: 20 TABLET ORAL at 08:04

## 2023-04-06 RX ADMIN — LORAZEPAM 1 MG: 2 INJECTION INTRAMUSCULAR; INTRAVENOUS at 12:04

## 2023-04-06 RX ADMIN — SODIUM CHLORIDE: 9 INJECTION, SOLUTION INTRAVENOUS at 05:04

## 2023-04-06 RX ADMIN — DIVALPROEX SODIUM 250 MG: 250 TABLET, DELAYED RELEASE ORAL at 08:04

## 2023-04-06 RX ADMIN — LORAZEPAM 1 MG: 2 INJECTION INTRAMUSCULAR; INTRAVENOUS at 09:04

## 2023-04-06 RX ADMIN — DIVALPROEX SODIUM 250 MG: 250 TABLET, DELAYED RELEASE ORAL at 05:04

## 2023-04-06 RX ADMIN — IBUPROFEN 800 MG: 400 TABLET ORAL at 08:04

## 2023-04-06 RX ADMIN — ACETAMINOPHEN 1000 MG: 500 TABLET ORAL at 01:04

## 2023-04-06 RX ADMIN — PANTOPRAZOLE SODIUM 40 MG: 40 TABLET, DELAYED RELEASE ORAL at 08:04

## 2023-04-06 NOTE — PLAN OF CARE
SS spoke with pt's sister, Rosemary [phone]. She found pt a facility that has a bed available. SS told nurse to call [facility] at [phone] and to ask for admissions.

## 2023-04-06 NOTE — SUBJECTIVE & OBJECTIVE
Interval History: naeo    Review of Systems   Constitutional:  Positive for fatigue. Negative for appetite change and fever.   HENT:  Negative for congestion, hearing loss and trouble swallowing.    Respiratory:  Negative for chest tightness and wheezing.    Cardiovascular:  Negative for chest pain and palpitations.   Gastrointestinal:  Negative for abdominal pain, constipation and nausea.   Genitourinary:  Negative for difficulty urinating and dysuria.   Musculoskeletal:  Negative for back pain and neck stiffness.   Skin:  Negative for pallor and rash.   Neurological:  Negative for dizziness, speech difficulty and headaches.   Psychiatric/Behavioral:  Positive for sleep disturbance. Negative for confusion and suicidal ideas.    Objective:     Vital Signs (Most Recent):  Temp: 97.7 °F (36.5 °C) (04/06/23 0400)  Pulse: 109 (04/06/23 0400)  Resp: 19 (04/06/23 0400)  BP: 138/65 (04/06/23 0508)  SpO2: 96 % (04/06/23 0400)   Vital Signs (24h Range):  Temp:  [97.2 °F (36.2 °C)-98.2 °F (36.8 °C)] 97.7 °F (36.5 °C)  Pulse:  [102-109] 109  Resp:  [18-19] 19  SpO2:  [96 %-98 %] 96 %  BP: (125-154)/(65-82) 138/65     Weight: 109.4 kg (241 lb 2.9 oz)  Body mass index is 32.71 kg/m².    Intake/Output Summary (Last 24 hours) at 4/6/2023 1106  Last data filed at 4/6/2023 0057  Gross per 24 hour   Intake --   Output 1100 ml   Net -1100 ml        Physical Exam  Vitals reviewed.   Constitutional:       General: He is awake. He is not in acute distress.     Appearance: He is well-developed. He is obese. He is not toxic-appearing.   HENT:      Head: Normocephalic.      Nose: Nose normal.      Mouth/Throat:      Pharynx: Oropharynx is clear.   Eyes:      Extraocular Movements: Extraocular movements intact.      Pupils: Pupils are equal, round, and reactive to light.   Neck:      Thyroid: No thyroid mass.      Vascular: No carotid bruit.   Cardiovascular:      Rate and Rhythm: Normal rate and regular rhythm.      Pulses: Normal pulses.       Heart sounds: Normal heart sounds. No murmur heard.  Pulmonary:      Effort: Pulmonary effort is normal.      Breath sounds: Normal breath sounds and air entry. No wheezing.   Abdominal:      General: Bowel sounds are normal. There is no distension.      Palpations: Abdomen is soft.      Tenderness: There is no abdominal tenderness.   Musculoskeletal:         General: Normal range of motion.      Cervical back: Neck supple. No rigidity.   Skin:     General: Skin is warm.      Coloration: Skin is not jaundiced.      Findings: No lesion.   Neurological:      General: No focal deficit present.      Mental Status: He is alert and oriented to person, place, and time.      Cranial Nerves: No cranial nerve deficit.   Psychiatric:         Attention and Perception: Attention normal.         Mood and Affect: Mood normal.         Behavior: Behavior normal. Behavior is cooperative.         Thought Content: Thought content normal.         Cognition and Memory: Cognition normal.       Significant Labs: All pertinent labs within the past 24 hours have been reviewed.    Significant Imaging: I have reviewed all pertinent imaging results/findings within the past 24 hours.

## 2023-04-06 NOTE — PT/OT/SLP PROGRESS
Physical Therapy Treatment    Patient Name:  Carol Ladd Jr.   MRN:  [id number]    Recommendations:     Discharge Recommendations: rehabilitation facility, substance abuse facility  Discharge Equipment Recommendations: none  Barriers to discharge: None    Assessment:     Carol Ladd Jr. is a 47 y.o. male admitted with a medical diagnosis of Alcohol withdrawal syndrome with complication.  He presents with the following impairments/functional limitations: impaired functional mobility, gait instability, impaired balance, decreased safety awareness Pt has returned to physical baseline and no longer requires PT. Pt is ambulating in hallway without assistive device. Will D/c PT at this time.    Rehab Prognosis: Good; patient would benefit from acute skilled PT services to address these deficits and reach maximum level of function.    Recent Surgery: * No surgery found *      Plan:     During this hospitalization, patient to be seen 5 x/week to address the identified rehab impairments via gait training, therapeutic activities, therapeutic exercises and progress toward the following goals:    Plan of Care Expires:  05/01/23    Subjective     Chief Complaint: alcohol withdrawal  Patient/Family Comments/goals: Pt states he is leaving tomorrow  Pain/Comfort:  Pain Rating 1: 0/10  Pain Rating Post-Intervention 1: 0/10      Objective:     Communicated with UDAY Russell RN prior to session.  Patient found supine with peripheral IV upon PT entry to room.     General Precautions: Standard, fall  Orthopedic Precautions: N/A  Braces: N/A  Respiratory Status: Room air     Functional Mobility:  Bed Mobility:     Scooting: independence  Supine to Sit: independence  Sit to Supine: independence  Transfers:     Sit to Stand:  independence with no AD  Bed to Chair: supervision with  no AD  using  Step Transfer  Gait: 600 ft, independent, no AD, decreased stance time on LLE, reciprocal pattern  Balance: good      AM-PAC 6 CLICK  MOBILITY  Turning over in bed (including adjusting bedclothes, sheets and blankets)?: 4  Sitting down on and standing up from a chair with arms (e.g., wheelchair, bedside commode, etc.): 4  Moving from lying on back to sitting on the side of the bed?: 4  Moving to and from a bed to a chair (including a wheelchair)?: 4  Need to walk in hospital room?: 4  Climbing 3-5 steps with a railing?: 4  Basic Mobility Total Score: 24       Treatment & Education:  Ambulation as noted above  LAQ x 30    Patient left HOB elevated with all lines intact and call button in reach..    GOALS:   Multidisciplinary Problems       Physical Therapy Goals          Problem: Physical Therapy    Goal Priority Disciplines Outcome Goal Variances Interventions   Physical Therapy Goal     PT, PT/OT Ongoing, Progressing     Description: Short Term Goals to be met by: 4/15/23    Patient will increase functional independence with mobility by performin. Supine to sit with Stand by assist  2. Sit to stand transfer with Stand by assist using Rolling walker  3. Bed to chair transfer with Stand by assist using Rolling walker  4. Gait  x 100 feet with Stand by assist using Rolling walker  5. Lower extremity exercise program x30 reps per handout, with assistance as needed    Long Term Goals to be met by: 23    Pt will regain full independent functional mobility with lowest level of assistive device to return to home situation and prior activities of daily living.                        Time Tracking:     PT Received On: 23  PT Start Time: 1125     PT Stop Time: 1138  PT Total Time (min): 13 min     Billable Minutes: Therapeutic Exercise 10    Treatment Type: Treatment  PT/PTA: PT     Number of PTA visits since last PT visit: 0     2023

## 2023-04-06 NOTE — PROGRESS NOTES
[facility] Medicine  Progress Note    Patient Name: Carol Ladd Jr.  MRN: [id number]  Patient Class: IP- Inpatient   Admission Date: 3/21/2023  Length of Stay: 16 days  Attending Physician: Tavo Woodard DO  Primary Care Provider: Raymundo Cabrera MD        Subjective:     Principal Problem:Alcohol withdrawal syndrome with complication        HPI:    Principal Problem: Alcohol withdrawal syndrome with complication     Subjective:      HPI:  48 y/o male who was transferred to [facility] for increased agitation from [place]. He was admitted there 48 hours ago for ETOH detox. He drinks over 1/5 of alcohol daily and has for several years to help with his tourette's.  Family states that he helps better than medication.  He has also been on Subaxone for several years as well.  His last drink for 03/19. ETOH level was over 400 at [facility].  On arrival to the ER his HR was 160 and he was diaphoretic.  Lactic acid was 10 on ABG and CK 3,333 and Cr 4.38. He was treated with 3 liters NS. He then became hypotensive and was started on Levophed.   On exam he is lethargic due to receiving 6 mg IV ativan but he states that his muscle spasm/contractions are similar to his tics but much worse.  He was started on several psych medications at [facility] but was not taking any at home.      PMH - HTN, ODD, tourette's, opoid abuse and ETOH abuse.                Past Medical History:   Diagnosis Date    Hypertension      Mixed obsessional thoughts and acts 01/24/2017    Tourette syndrome      Tourette's syndrome 01/24/2017         History reviewed. No pertinent surgical history.          Review of patient's allergies indicates:   Allergen Reactions    Haldol [haloperidol lactate]           Family History         Problem Relation (Age of Onset)     OCD Mother, Sister, Daughter     Tics Father, Paternal Uncle, Cousin                   Tobacco Use    Smoking status: Never    Smokeless  tobacco: Not on file   Substance and Sexual Activity    Alcohol use: Yes       Comment: reports drinking a 5th of alcohol/day    Drug use: Not Currently    Sexual activity: Not Currently           Overview/Hospital Course:  04/04 - Records reviewed. Admitted from [facility] 03/21 after noted hypotension and altered mental status. There secondary EtoH abuse. Had abnoranl CXR. Was intubated for several days then HFNC. Since mental status back to baseline and on 2L BNC.   Past Medical History:   Diagnosis Date    Hypertension      Mixed obsessional thoughts and acts 01/24/2017    Tourette syndrome      Tourette's syndrome    -Also THOMAS. Wears CPAP but didn't bring with him to [facility]. Nurse staff tell me he is a big snorer and has apnea on floor droping sats while asleep. CXR better. Increase activity; told off balance.   Look into Dc needs.  4/5- still intermittently tachycardic.  Respiratory status improved. Doing well  4/6-denied by [facility], medically stable for discharge.  Can go once ride arranged      Interval History: naeo    Review of Systems   Constitutional:  Positive for fatigue. Negative for appetite change and fever.   HENT:  Negative for congestion, hearing loss and trouble swallowing.    Respiratory:  Negative for chest tightness and wheezing.    Cardiovascular:  Negative for chest pain and palpitations.   Gastrointestinal:  Negative for abdominal pain, constipation and nausea.   Genitourinary:  Negative for difficulty urinating and dysuria.   Musculoskeletal:  Negative for back pain and neck stiffness.   Skin:  Negative for pallor and rash.   Neurological:  Negative for dizziness, speech difficulty and headaches.   Psychiatric/Behavioral:  Positive for sleep disturbance. Negative for confusion and suicidal ideas.    Objective:     Vital Signs (Most Recent):  Temp: 97.7 °F (36.5 °C) (04/06/23 0400)  Pulse: 109 (04/06/23 0400)  Resp: 19 (04/06/23 0400)  BP: 138/65 (04/06/23 0508)  SpO2: 96 %  (04/06/23 0400)   Vital Signs (24h Range):  Temp:  [97.2 °F (36.2 °C)-98.2 °F (36.8 °C)] 97.7 °F (36.5 °C)  Pulse:  [102-109] 109  Resp:  [18-19] 19  SpO2:  [96 %-98 %] 96 %  BP: (125-154)/(65-82) 138/65     Weight: 109.4 kg (241 lb 2.9 oz)  Body mass index is 32.71 kg/m².    Intake/Output Summary (Last 24 hours) at 4/6/2023 1106  Last data filed at 4/6/2023 0057  Gross per 24 hour   Intake --   Output 1100 ml   Net -1100 ml        Physical Exam  Vitals reviewed.   Constitutional:       General: He is awake. He is not in acute distress.     Appearance: He is well-developed. He is obese. He is not toxic-appearing.   HENT:      Head: Normocephalic.      Nose: Nose normal.      Mouth/Throat:      Pharynx: Oropharynx is clear.   Eyes:      Extraocular Movements: Extraocular movements intact.      Pupils: Pupils are equal, round, and reactive to light.   Neck:      Thyroid: No thyroid mass.      Vascular: No carotid bruit.   Cardiovascular:      Rate and Rhythm: Normal rate and regular rhythm.      Pulses: Normal pulses.      Heart sounds: Normal heart sounds. No murmur heard.  Pulmonary:      Effort: Pulmonary effort is normal.      Breath sounds: Normal breath sounds and air entry. No wheezing.   Abdominal:      General: Bowel sounds are normal. There is no distension.      Palpations: Abdomen is soft.      Tenderness: There is no abdominal tenderness.   Musculoskeletal:         General: Normal range of motion.      Cervical back: Neck supple. No rigidity.   Skin:     General: Skin is warm.      Coloration: Skin is not jaundiced.      Findings: No lesion.   Neurological:      General: No focal deficit present.      Mental Status: He is alert and oriented to person, place, and time.      Cranial Nerves: No cranial nerve deficit.   Psychiatric:         Attention and Perception: Attention normal.         Mood and Affect: Mood normal.         Behavior: Behavior normal. Behavior is cooperative.         Thought Content:  Thought content normal.         Cognition and Memory: Cognition normal.       Significant Labs: All pertinent labs within the past 24 hours have been reviewed.    Significant Imaging: I have reviewed all pertinent imaging results/findings within the past 24 hours.      Assessment/Plan:      Obstructive sleep apnea    Restart CPAP. He is to have family bring his from home    Depression  Patient has persistent depression which is moderate and is currently uncontrolled. Will Continue anti-depressant medications. We will not consult psychiatry at this time. Patient does not display psychosis at this time. Continue to monitor closely and adjust plan of care as needed.    No SI/HI  Safe for DC home        HTN (hypertension)    Adjust meds    Acute kidney injury  Patient with acute kidney injury likely due to pre-renal azotemia EVELYN is currently improving. Labs reviewed- Renal function/electrolytes with Estimated Creatinine Clearance: 90.3 mL/min (A) (based on SCr of 1.32 mg/dL (H)). according to latest data. Monitor urine output and serial BMP and adjust therapy as needed. Avoid nephrotoxins and renally dose meds for GFR listed above.     04/04 resolved    4/5- weaning oxygen, can return to [facility] soon  4/6-stable for dc, awaiting placement    Tourette syndrome  stable        VTE Risk Mitigation (From admission, onward)         Ordered     enoxaparin injection 40 mg  Daily         03/26/23 3563                Discharge Planning   JEZ:      Code Status: Not on file   Is the patient medically ready for discharge?:     Reason for patient still in hospital (select all that apply): Treatment  Discharge Plan A: Rehab                  Tavo Woodard DO  Department of Hospital Medicine   [facility]

## 2023-04-06 NOTE — ASSESSMENT & PLAN NOTE
Patient has persistent depression which is moderate and is currently uncontrolled. Will Continue anti-depressant medications. We will not consult psychiatry at this time. Patient does not display psychosis at this time. Continue to monitor closely and adjust plan of care as needed.    No SI/HI  Safe for DC home

## 2023-04-06 NOTE — PLAN OF CARE
[facility] denied pt due to pt does not need Detox at this point. [facility] says pt is ok to discharge home from their standpoint.     SS checking with [facility] on transportation for pt to go home. Awaiting a return call. Message left.

## 2023-04-06 NOTE — ASSESSMENT & PLAN NOTE
Patient with acute kidney injury likely due to pre-renal azotemia EVELYN is currently improving. Labs reviewed- Renal function/electrolytes with Estimated Creatinine Clearance: 90.3 mL/min (A) (based on SCr of 1.32 mg/dL (H)). according to latest data. Monitor urine output and serial BMP and adjust therapy as needed. Avoid nephrotoxins and renally dose meds for GFR listed above.     04/04 resolved    4/5- weaning oxygen, can return to [facility] soon  4/6-stable for dc, awaiting placement

## 2023-04-07 PROBLEM — N17.9 ACUTE KIDNEY INJURY: Status: RESOLVED | Noted: 2023-03-21 | Resolved: 2023-04-07

## 2023-04-07 LAB
ANION GAP SERPL CALCULATED.3IONS-SCNC: 16 MMOL/L (ref 7–16)
BASOPHILS # BLD AUTO: 0.12 K/UL (ref 0–0.2)
BASOPHILS NFR BLD AUTO: 0.8 % (ref 0–1)
BUN SERPL-MCNC: 29 MG/DL (ref 7–18)
BUN/CREAT SERPL: 17 (ref 6–20)
CALCIUM SERPL-MCNC: 9.7 MG/DL (ref 8.5–10.1)
CHLORIDE SERPL-SCNC: 103 MMOL/L (ref 98–107)
CO2 SERPL-SCNC: 26 MMOL/L (ref 21–32)
CREAT SERPL-MCNC: 1.67 MG/DL (ref 0.7–1.3)
DIFFERENTIAL METHOD BLD: ABNORMAL
EGFR (NO RACE VARIABLE) (RUSH/TITUS): 51 ML/MIN/1.73M²
EOSINOPHIL # BLD AUTO: 0.27 K/UL (ref 0–0.5)
EOSINOPHIL NFR BLD AUTO: 1.8 % (ref 1–4)
ERYTHROCYTE [DISTWIDTH] IN BLOOD BY AUTOMATED COUNT: 16.2 % (ref 11.5–14.5)
GLUCOSE SERPL-MCNC: 94 MG/DL (ref 74–106)
HCT VFR BLD AUTO: 29.7 % (ref 40–54)
HGB BLD-MCNC: 9.7 G/DL (ref 13.5–18)
IMM GRANULOCYTES # BLD AUTO: 0.42 K/UL (ref 0–0.04)
IMM GRANULOCYTES NFR BLD: 2.8 % (ref 0–0.4)
LYMPHOCYTES # BLD AUTO: 1.99 K/UL (ref 1–4.8)
LYMPHOCYTES NFR BLD AUTO: 13.3 % (ref 27–41)
MCH RBC QN AUTO: 33.8 PG (ref 27–31)
MCHC RBC AUTO-ENTMCNC: 32.7 G/DL (ref 32–36)
MCV RBC AUTO: 103.5 FL (ref 80–96)
MONOCYTES # BLD AUTO: 1.24 K/UL (ref 0–0.8)
MONOCYTES NFR BLD AUTO: 8.3 % (ref 2–6)
MPC BLD CALC-MCNC: 9.5 FL (ref 9.4–12.4)
NEUTROPHILS # BLD AUTO: 10.97 K/UL (ref 1.8–7.7)
NEUTROPHILS NFR BLD AUTO: 73 % (ref 53–65)
NRBC # BLD AUTO: 0 X10E3/UL
NRBC, AUTO (.00): 0 %
PLATELET # BLD AUTO: 941 K/UL (ref 150–400)
POTASSIUM SERPL-SCNC: 3.5 MMOL/L (ref 3.5–5.1)
RBC # BLD AUTO: 2.87 M/UL (ref 4.6–6.2)
SODIUM SERPL-SCNC: 141 MMOL/L (ref 136–145)
WBC # BLD AUTO: 15.01 K/UL (ref 4.5–11)

## 2023-04-07 PROCEDURE — 25000003 PHARM REV CODE 250: Performed by: HOSPITALIST

## 2023-04-07 PROCEDURE — 25000003 PHARM REV CODE 250: Performed by: NURSE PRACTITIONER

## 2023-04-07 PROCEDURE — 27000221 HC OXYGEN, UP TO 24 HOURS

## 2023-04-07 PROCEDURE — G0008 ADMIN INFLUENZA VIRUS VAC: HCPCS | Performed by: STUDENT IN AN ORGANIZED HEALTH CARE EDUCATION/TRAINING PROGRAM

## 2023-04-07 PROCEDURE — 90471 IMMUNIZATION ADMIN: CPT | Performed by: STUDENT IN AN ORGANIZED HEALTH CARE EDUCATION/TRAINING PROGRAM

## 2023-04-07 PROCEDURE — 80048 BASIC METABOLIC PNL TOTAL CA: CPT | Performed by: INTERNAL MEDICINE

## 2023-04-07 PROCEDURE — 25000003 PHARM REV CODE 250: Performed by: INTERNAL MEDICINE

## 2023-04-07 PROCEDURE — 99239 PR HOSPITAL DISCHARGE DAY,>30 MIN: ICD-10-PCS | Mod: ,,, | Performed by: STUDENT IN AN ORGANIZED HEALTH CARE EDUCATION/TRAINING PROGRAM

## 2023-04-07 PROCEDURE — 63600175 PHARM REV CODE 636 W HCPCS: Performed by: STUDENT IN AN ORGANIZED HEALTH CARE EDUCATION/TRAINING PROGRAM

## 2023-04-07 PROCEDURE — 94761 N-INVAS EAR/PLS OXIMETRY MLT: CPT

## 2023-04-07 PROCEDURE — 99900035 HC TECH TIME PER 15 MIN (STAT)

## 2023-04-07 PROCEDURE — 85025 COMPLETE CBC W/AUTO DIFF WBC: CPT | Performed by: INTERNAL MEDICINE

## 2023-04-07 PROCEDURE — 90686 IIV4 VACC NO PRSV 0.5 ML IM: CPT | Performed by: STUDENT IN AN ORGANIZED HEALTH CARE EDUCATION/TRAINING PROGRAM

## 2023-04-07 PROCEDURE — 11000001 HC ACUTE MED/SURG PRIVATE ROOM

## 2023-04-07 PROCEDURE — 25000003 PHARM REV CODE 250: Performed by: STUDENT IN AN ORGANIZED HEALTH CARE EDUCATION/TRAINING PROGRAM

## 2023-04-07 PROCEDURE — 63600175 PHARM REV CODE 636 W HCPCS: Performed by: NURSE PRACTITIONER

## 2023-04-07 PROCEDURE — 99239 HOSP IP/OBS DSCHRG MGMT >30: CPT | Mod: ,,, | Performed by: STUDENT IN AN ORGANIZED HEALTH CARE EDUCATION/TRAINING PROGRAM

## 2023-04-07 PROCEDURE — 94660 CPAP INITIATION&MGMT: CPT

## 2023-04-07 RX ORDER — AMLODIPINE BESYLATE 10 MG/1
10 TABLET ORAL DAILY
Status: DISCONTINUED | OUTPATIENT
Start: 2023-04-07 | End: 2023-04-10 | Stop reason: HOSPADM

## 2023-04-07 RX ORDER — ALPRAZOLAM 0.5 MG/1
0.5 TABLET ORAL ONCE
Status: COMPLETED | OUTPATIENT
Start: 2023-04-07 | End: 2023-04-07

## 2023-04-07 RX ADMIN — AMLODIPINE BESYLATE 10 MG: 10 TABLET ORAL at 09:04

## 2023-04-07 RX ADMIN — CLORAZEPATE DIPOTASSIUM 3.75 MG: 3.75 TABLET ORAL at 09:04

## 2023-04-07 RX ADMIN — HYDRALAZINE HYDROCHLORIDE 25 MG: 25 TABLET ORAL at 04:04

## 2023-04-07 RX ADMIN — ESCITALOPRAM OXALATE 10 MG: 10 TABLET ORAL at 09:04

## 2023-04-07 RX ADMIN — HYDRALAZINE HYDROCHLORIDE 25 MG: 25 TABLET ORAL at 06:04

## 2023-04-07 RX ADMIN — FENTANYL 1 PATCH: 12.5 PATCH TRANSDERMAL at 05:04

## 2023-04-07 RX ADMIN — INFLUENZA VIRUS VACCINE 0.5 ML: 15; 15; 15; 15 SUSPENSION INTRAMUSCULAR at 05:04

## 2023-04-07 RX ADMIN — DIVALPROEX SODIUM 250 MG: 250 TABLET, DELAYED RELEASE ORAL at 06:04

## 2023-04-07 RX ADMIN — DIVALPROEX SODIUM 250 MG: 250 TABLET, DELAYED RELEASE ORAL at 04:04

## 2023-04-07 RX ADMIN — PANTOPRAZOLE SODIUM 40 MG: 40 TABLET, DELAYED RELEASE ORAL at 09:04

## 2023-04-07 RX ADMIN — Medication 4 CAPSULE: at 04:04

## 2023-04-07 RX ADMIN — ALPRAZOLAM 0.5 MG: 0.5 TABLET ORAL at 11:04

## 2023-04-07 RX ADMIN — ASPIRIN 81 MG: 81 TABLET, DELAYED RELEASE ORAL at 09:04

## 2023-04-07 RX ADMIN — ALPRAZOLAM 0.5 MG: 0.5 TABLET ORAL at 05:04

## 2023-04-07 RX ADMIN — Medication 4 CAPSULE: at 09:04

## 2023-04-07 RX ADMIN — ACETAMINOPHEN 1000 MG: 500 TABLET ORAL at 03:04

## 2023-04-07 RX ADMIN — ENOXAPARIN SODIUM 40 MG: 100 INJECTION SUBCUTANEOUS at 04:04

## 2023-04-07 RX ADMIN — Medication 4 CAPSULE: at 11:04

## 2023-04-07 RX ADMIN — HYDRALAZINE HYDROCHLORIDE 25 MG: 25 TABLET ORAL at 09:04

## 2023-04-07 RX ADMIN — DIVALPROEX SODIUM 250 MG: 250 TABLET, DELAYED RELEASE ORAL at 09:04

## 2023-04-07 NOTE — PLAN OF CARE
Problem: Infection  Goal: Absence of Infection Signs and Symptoms  Outcome: Ongoing, Progressing     Problem: Adult Inpatient Plan of Care  Goal: Plan of Care Review  Outcome: Ongoing, Progressing  Goal: Absence of Hospital-Acquired Illness or Injury  Outcome: Ongoing, Progressing  Goal: Optimal Comfort and Wellbeing  Outcome: Ongoing, Progressing  Goal: Readiness for Transition of Care  Outcome: Ongoing, Progressing

## 2023-04-07 NOTE — PLAN OF CARE
Problem: Infection  Goal: Absence of Infection Signs and Symptoms  4/7/2023 1711 by Stacie White RN  Outcome: Ongoing, Progressing  4/7/2023 1703 by Stacie White RN  Outcome: Ongoing, Progressing     Problem: Adult Inpatient Plan of Care  Goal: Plan of Care Review  4/7/2023 1711 by Stacie White RN  Outcome: Ongoing, Progressing  4/7/2023 1703 by Stacie White RN  Outcome: Ongoing, Progressing  Goal: Patient-Specific Goal (Individualized)  4/7/2023 1711 by Stacie White RN  Outcome: Ongoing, Progressing  4/7/2023 1703 by Stacie White RN  Outcome: Ongoing, Progressing  Goal: Absence of Hospital-Acquired Illness or Injury  4/7/2023 1711 by Stacie White RN  Outcome: Ongoing, Progressing  4/7/2023 1703 by Stacie White RN  Outcome: Ongoing, Progressing  Goal: Optimal Comfort and Wellbeing  4/7/2023 1711 by Stacie White RN  Outcome: Ongoing, Progressing  4/7/2023 1703 by Stacie White RN  Outcome: Ongoing, Progressing  Goal: Readiness for Transition of Care  4/7/2023 1711 by Stacie White RN  Outcome: Ongoing, Progressing  4/7/2023 1703 by Stacie White RN  Outcome: Ongoing, Progressing     Problem: Fluid and Electrolyte Imbalance (Acute Kidney Injury/Impairment)  Goal: Fluid and Electrolyte Balance  4/7/2023 1711 by Stacie White RN  Outcome: Ongoing, Progressing  4/7/2023 1703 by Stacie White RN  Outcome: Ongoing, Progressing     Problem: Oral Intake Inadequate (Acute Kidney Injury/Impairment)  Goal: Optimal Nutrition Intake  4/7/2023 1711 by Stacie White RN  Outcome: Ongoing, Progressing  4/7/2023 1703 by Stacie White RN  Outcome: Ongoing, Progressing     Problem: Renal Function Impairment (Acute Kidney Injury/Impairment)  Goal: Effective Renal Function  4/7/2023 1711 by Stacie White RN  Outcome: Ongoing, Progressing  4/7/2023 1703 by Stacie White RN  Outcome: Ongoing, Progressing     Problem: Skin Injury Risk Increased  Goal: Skin Health and Integrity  4/7/2023 1711 by  Stacie White RN  Outcome: Ongoing, Progressing  4/7/2023 1703 by Stacie White RN  Outcome: Ongoing, Progressing     Problem: Fall Injury Risk  Goal: Absence of Fall and Fall-Related Injury  4/7/2023 1711 by Stacie White RN  Outcome: Ongoing, Progressing  4/7/2023 1703 by Stacie White RN  Outcome: Ongoing, Progressing     Problem: Impaired Wound Healing  Goal: Optimal Wound Healing  4/7/2023 1711 by Stacie White RN  Outcome: Ongoing, Progressing  4/7/2023 1703 by Stacie White RN  Outcome: Ongoing, Progressing     Problem: Breathing Pattern Ineffective  Goal: Effective Breathing Pattern  4/7/2023 1711 by Stacie White RN  Outcome: Ongoing, Progressing  4/7/2023 1703 by Stacie White RN  Outcome: Ongoing, Progressing     Problem: Gas Exchange Impaired  Goal: Optimal Gas Exchange  4/7/2023 1711 by Stacie White RN  Outcome: Ongoing, Progressing  4/7/2023 1703 by Stacie White RN  Outcome: Ongoing, Progressing

## 2023-04-07 NOTE — PLAN OF CARE
SS spoke with [facility] and they needed medical records and also needed to complete and intake evaluation. Call transferred to pt's room. SS notified Sister Rosemary

## 2023-04-07 NOTE — DISCHARGE SUMMARY
[facility]  Hospital Medicine  Discharge Summary      Patient Name: Carol Ladd Jr.  MRN: [id number]  ROBB: [id number]  Patient Class: IP- Inpatient  Admission Date: 3/21/2023  Hospital Length of Stay: 17 days  Discharge Date and Time:  04/07/2023 7:38 AM  Attending Physician: Tavo Woodard DO   Discharging Provider: Tavo Woodard DO  Primary Care Provider: Raymudno Cabrera MD    Primary Care Team: Networked reference to record PCT     HPI:     Principal Problem: Alcohol withdrawal syndrome with complication     Subjective:      HPI:  48 y/o male who was transferred to [facility] for increased agitation from [place]. He was admitted there 48 hours ago for ETOH detox. He drinks over 1/5 of alcohol daily and has for several years to help with his tourette's.  Family states that he helps better than medication.  He has also been on Subaxone for several years as well.  His last drink for 03/19. ETOH level was over 400 at [facility].  On arrival to the ER his HR was 160 and he was diaphoretic.  Lactic acid was 10 on ABG and CK 3,333 and Cr 4.38. He was treated with 3 liters NS. He then became hypotensive and was started on Levophed.   On exam he is lethargic due to receiving 6 mg IV ativan but he states that his muscle spasm/contractions are similar to his tics but much worse.  He was started on several psych medications at [facility] but was not taking any at home.      PMH - HTN, ODD, tourette's, opoid abuse and ETOH abuse.                Past Medical History:   Diagnosis Date    Hypertension      Mixed obsessional thoughts and acts 01/24/2017    Tourette syndrome      Tourette's syndrome 01/24/2017         History reviewed. No pertinent surgical history.          Review of patient's allergies indicates:   Allergen Reactions    Haldol [haloperidol lactate]           Family History         Problem Relation (Age of Onset)     OCD Mother, Sister, Daughter     Tics Father, Paternal  Uncle, Cousin                   Tobacco Use    Smoking status: Never    Smokeless tobacco: Not on file   Substance and Sexual Activity    Alcohol use: Yes       Comment: reports drinking a 5th of alcohol/day    Drug use: Not Currently    Sexual activity: Not Currently           * No surgery found *      Hospital Course:   04/04 - Records reviewed. Admitted from [facility] 03/21 after noted hypotension and altered mental status. There secondary EtoH abuse. Had abnoranl CXR. Was intubated for several days then HFNC. Since mental status back to baseline and on 2L BNC.   Past Medical History:   Diagnosis Date    Hypertension      Mixed obsessional thoughts and acts 01/24/2017    Tourette syndrome      Tourette's syndrome    -Also THOMAS. Wears CPAP but didn't bring with him to [facility]. Nurse staff tell me he is a big snorer and has apnea on floor droping sats while asleep. CXR better. Increase activity; told off balance.   Look into Dc needs.  4/5- still intermittently tachycardic.  Respiratory status improved. Doing well  4/6-denied by [facility], medically stable for discharge.  Can go once ride arranged    4/7-medically stable, after discussion with family decided to go to [facility] rehab. Can DC once ride arrives         Goals of Care Treatment Preferences:         Consults:   Consults (From admission, onward)        Status Ordering Provider     Inpatient consult to Social Work  Once        Provider:  (Not yet assigned)    Completed LISSA TREVIZO     Inpatient consult to Registered Dietitian/Nutritionist  Once        Provider:  (Not yet assigned)    Completed LISSA TREVIZO     Inpatient consult to Social Work  Once        Provider:  (Not yet assigned)    Completed JORGE ALBERTO RAYMUNDO     Inpatient consult to Telemedicine - Psychiatry  Once        Provider:  (Not yet assigned)    Completed JORGE ALBERTO RAYMUNDO     Inpatient consult to Cardiology  Once        Provider:  (Not yet assigned)    Completed  JORDI BATISTA     Inpatient consult to Registered Dietitian/Nutritionist  Once        Provider:  (Not yet assigned)    Completed JORDI BATISTA          Neuro  Tourette syndrome  stable      Psychiatric  Depression  Patient has persistent depression which is moderate and is currently uncontrolled. Will Continue anti-depressant medications. We will not consult psychiatry at this time. Patient does not display psychosis at this time. Continue to monitor closely and adjust plan of care as needed.    No SI/HI  Safe for DC home        Cardiac/Vascular  HTN (hypertension)    pcp follow up  Cr ticked up after starting acei  Hold for now, increase amlo    Other  Obstructive sleep apnea    Continue cpap      Final Active Diagnoses:    Diagnosis Date Noted POA    Obstructive sleep apnea [G47.33] 03/22/2023 Yes    HTN (hypertension) [I10] 03/21/2023 Yes    Depression [F32.A] 03/21/2023 Yes    Tourette syndrome [F95.2] 01/24/2017 Yes      Problems Resolved During this Admission:    Diagnosis Date Noted Date Resolved POA    PRINCIPAL PROBLEM:  Alcohol withdrawal syndrome with complication [F10.939] 03/21/2023 04/03/2023 Yes    Acute respiratory failure with hypoxia and hypercarbia [J96.01, J96.02] 03/27/2023 04/03/2023 No    Encephalopathy, toxic [G92.9] 03/23/2023 04/03/2023 Yes    Elevated troponin level not due myocardial infarction [R77.8] 03/23/2023 04/03/2023 Yes    Acute kidney injury [N17.9] 03/21/2023 04/07/2023 Yes    Rhabdomyolysis [M62.82] 03/21/2023 04/03/2023 No    Metabolic acidosis [E87.20] 03/21/2023 04/03/2023 Yes    AMS (altered mental status) [R41.82] 03/21/2023 04/03/2023 Yes       Discharged Condition: good    Disposition: Rehab Facility    Follow Up:   Follow-up Information     MICHAEL Jacobo Follow up in 2 week(s).    Specialty: Cardiology  Why: Schedule cardiology follow up 2 weeks after discharge for outpatient ischemic evaluation.  Contact information:  [address]  [facility] [address]  [phone]             Raymundo Cabrera MD. Schedule an appointment as soon as possible for a visit in 1 month(s).    Specialty: Family Medicine  Contact information:  [address]  [facility] [address]  [phone]                       Patient Instructions:      Diet Adult Regular     Activity as tolerated       Significant Diagnostic Studies: Labs: All labs within the past 24 hours have been reviewed    Pending Diagnostic Studies:     Procedure Component Value Units Date/Time    EXTRA TUBES [[id number]] Collected: 03/25/23 0804    Order Status: Sent Lab Status: In process Updated: 03/25/23 0804    Specimen: Blood, Venous     Narrative:      The following orders were created for panel order EXTRA TUBES.  Procedure                               Abnormality         Status                     ---------                               -----------         ------                     Red Top Hold[[id number]]                                     In process                   Please view results for these tests on the individual orders.    EXTRA TUBES [[id number]]     Order Status: Sent Lab Status: No result     Specimen: Blood, Venous     EXTRA TUBES [[id number]] Collected: 03/21/23 2147    Order Status: Sent Lab Status: In process Updated: 03/21/23 2147    Specimen: Blood, Venous     Narrative:      The following orders were created for panel order EXTRA TUBES.  Procedure                               Abnormality         Status                     ---------                               -----------         ------                     Lavender Top Hold[[id number]]                                In process                   Please view results for these tests on the individual orders.         Medications:  Reconciled Home Medications:      Medication List      CHANGE how you take these medications    clorazepate 15 MG tablet  Commonly known as: TRANXENE  Take 1 tablet (15 mg  total) by mouth 3 (three) times daily.  What changed: when to take this        CONTINUE taking these medications    buprenorphine-naloxone 2-0.5 mg 2-0.5 mg Subl  Commonly known as: SUBOXONE  Place 8 mg under the tongue every 6 (six) hours as needed.     chlordiazepoxide 10 MG capsule  Commonly known as: LIBRIUM  Take 2 capsules (20 mg total) by mouth 3 (three) times daily as needed (tics).     clomiPHENE 50 mg tablet  Commonly known as: CLOMID  Take 50 mg by mouth once daily.     clonazePAM 1 MG tablet  Commonly known as: KlonoPIN  Take 1 mg by mouth 2 (two) times daily as needed for Anxiety.     dextroamphetamine-amphetamine 30 mg Tab  Take by mouth.     doxepin 25 MG capsule  Commonly known as: SINEQUAN  Take 25 mg by mouth every evening.     FLUoxetine 20 MG capsule  Take 20 mg by mouth once daily.     guanFACINE 2 MG tablet  Commonly known as: TENEX  Take 2 mg by mouth every evening.     levETIRAcetam 500 MG Tab  Commonly known as: KEPPRA  Take 500 mg by mouth 2 (two) times daily.     omeprazole 40 MG capsule  Commonly known as: PRILOSEC  Take 40 mg by mouth once daily.     ONE DAILY MULTIVITAMIN per tablet  Generic drug: multivitamin  Take 1 tablet by mouth once daily.     pimozide 1 mg Tab  Commonly known as: ORAP  Take 1 mg by mouth 2 (two) times a day.     testosterone cypionate 100 mg/mL injection  Commonly known as: DEPOTESTOTERONE CYPIONATE  Inject 200 mg into the muscle every 14 (fourteen) days.     thiamine 100 MG tablet  Take 100 mg by mouth once daily.        STOP taking these medications    hydroCHLOROthiazide 25 MG tablet  Commonly known as: HYDRODIURIL     lisinopriL 20 MG tablet  Commonly known as: PRINIVIL,ZESTRIL     losartan 100 MG tablet  Commonly known as: COZAAR            Indwelling Lines/Drains at time of discharge:   Lines/Drains/Airways     None                 Time spent on the discharge of patient: >30 minutes         Tavo Woodard DO  Department of Hospital Medicine  [facility] - Short Stay Unit

## 2023-04-08 PROCEDURE — 27000221 HC OXYGEN, UP TO 24 HOURS

## 2023-04-08 PROCEDURE — 96372 THER/PROPH/DIAG INJ SC/IM: CPT

## 2023-04-08 PROCEDURE — 94761 N-INVAS EAR/PLS OXIMETRY MLT: CPT

## 2023-04-08 PROCEDURE — 25000003 PHARM REV CODE 250: Performed by: INTERNAL MEDICINE

## 2023-04-08 PROCEDURE — 25000003 PHARM REV CODE 250: Performed by: FAMILY MEDICINE

## 2023-04-08 PROCEDURE — 94660 CPAP INITIATION&MGMT: CPT

## 2023-04-08 PROCEDURE — 25000003 PHARM REV CODE 250: Performed by: NURSE PRACTITIONER

## 2023-04-08 PROCEDURE — 99232 SBSQ HOSP IP/OBS MODERATE 35: CPT | Mod: ,,, | Performed by: FAMILY MEDICINE

## 2023-04-08 PROCEDURE — 99900035 HC TECH TIME PER 15 MIN (STAT)

## 2023-04-08 PROCEDURE — 63600175 PHARM REV CODE 636 W HCPCS: Performed by: NURSE PRACTITIONER

## 2023-04-08 PROCEDURE — 25000003 PHARM REV CODE 250: Performed by: HOSPITALIST

## 2023-04-08 PROCEDURE — 11000001 HC ACUTE MED/SURG PRIVATE ROOM

## 2023-04-08 PROCEDURE — 25000003 PHARM REV CODE 250: Performed by: STUDENT IN AN ORGANIZED HEALTH CARE EDUCATION/TRAINING PROGRAM

## 2023-04-08 PROCEDURE — 99232 PR SUBSEQUENT HOSPITAL CARE,LEVL II: ICD-10-PCS | Mod: ,,, | Performed by: FAMILY MEDICINE

## 2023-04-08 RX ORDER — LOPERAMIDE HYDROCHLORIDE 2 MG/1
2 CAPSULE ORAL 4 TIMES DAILY PRN
Status: DISCONTINUED | OUTPATIENT
Start: 2023-04-08 | End: 2023-04-10 | Stop reason: HOSPADM

## 2023-04-08 RX ADMIN — ALPRAZOLAM 0.5 MG: 0.5 TABLET ORAL at 09:04

## 2023-04-08 RX ADMIN — HYDRALAZINE HYDROCHLORIDE 25 MG: 25 TABLET ORAL at 09:04

## 2023-04-08 RX ADMIN — ALPRAZOLAM 0.5 MG: 0.5 TABLET ORAL at 03:04

## 2023-04-08 RX ADMIN — HYDRALAZINE HYDROCHLORIDE 25 MG: 25 TABLET ORAL at 01:04

## 2023-04-08 RX ADMIN — CLORAZEPATE DIPOTASSIUM 3.75 MG: 3.75 TABLET ORAL at 09:04

## 2023-04-08 RX ADMIN — DIVALPROEX SODIUM 250 MG: 250 TABLET, DELAYED RELEASE ORAL at 05:04

## 2023-04-08 RX ADMIN — DIVALPROEX SODIUM 250 MG: 250 TABLET, DELAYED RELEASE ORAL at 01:04

## 2023-04-08 RX ADMIN — ENOXAPARIN SODIUM 40 MG: 100 INJECTION SUBCUTANEOUS at 05:04

## 2023-04-08 RX ADMIN — Medication 4 CAPSULE: at 05:04

## 2023-04-08 RX ADMIN — PANTOPRAZOLE SODIUM 40 MG: 40 TABLET, DELAYED RELEASE ORAL at 09:04

## 2023-04-08 RX ADMIN — IBUPROFEN 800 MG: 400 TABLET ORAL at 05:04

## 2023-04-08 RX ADMIN — AMLODIPINE BESYLATE 10 MG: 10 TABLET ORAL at 09:04

## 2023-04-08 RX ADMIN — Medication 4 CAPSULE: at 07:04

## 2023-04-08 RX ADMIN — ESCITALOPRAM OXALATE 10 MG: 10 TABLET ORAL at 09:04

## 2023-04-08 RX ADMIN — HYDRALAZINE HYDROCHLORIDE 25 MG: 25 TABLET ORAL at 05:04

## 2023-04-08 RX ADMIN — ALPRAZOLAM 0.5 MG: 0.5 TABLET ORAL at 07:04

## 2023-04-08 RX ADMIN — DIVALPROEX SODIUM 250 MG: 250 TABLET, DELAYED RELEASE ORAL at 09:04

## 2023-04-08 RX ADMIN — ACETAMINOPHEN 1000 MG: 500 TABLET ORAL at 03:04

## 2023-04-08 RX ADMIN — Medication 4 CAPSULE: at 11:04

## 2023-04-08 RX ADMIN — LOPERAMIDE HYDROCHLORIDE 2 MG: 2 CAPSULE ORAL at 01:04

## 2023-04-08 RX ADMIN — ASPIRIN 81 MG: 81 TABLET, DELAYED RELEASE ORAL at 09:04

## 2023-04-08 NOTE — PLAN OF CARE
Problem: Fluid and Electrolyte Imbalance (Acute Kidney Injury/Impairment)  Goal: Fluid and Electrolyte Balance  Outcome: Ongoing, Progressing  Intervention: Monitor and Manage Fluid and Electrolyte Balance  Flowsheets (Taken 4/8/2023 5022)  Fluid/Electrolyte Management: fluids provided

## 2023-04-08 NOTE — PROGRESS NOTES
[facility] - Short Stay Unit  Adult Nutrition  Progress note         Reason for Assessment  Reason For Assessment: RD follow-up + consult for poor nutrition/PO intake + wounds  Nutrition Risk Screen: no indicators present    Assessment and Plan  Consult received and appreciated. RD following pt.     Current weight is 241 lbs and stable. Will continue to monitor weight trends.     Pt is currently receiving a Regular diet + Ensure Clear TID + Charanjit. Per flowsheets, pt consuming 25-50% of meals. Intake is not adequate to meet nutritional needs. RD has visited with pt to obtain food preferences to increase kcal/pro intake. RD to added to diet order.     If poor PO intake continues, consider addition of appetite stimulant.     Recommend continue of Charanjit BID to aid in wound healing. Consider addition of 500 mg Vit C BID + 220 mg ZnSO4 BID + mvi daily to aid in wound healing as well.    Last BM 4/6 per flowsheets. Labs/meds reviewed. RD following.       Malnutrition  Is Patient Malnourished: No  Nutrition Diagnosis  Inadequate energy intake   related to decreased appetite/PO intake as evidenced by consuming 25-50% of meals, not adequate to meet nutritional needs.      Nutrition Diagnosis Status: continues     Nutrition Risk  Level of Risk/Frequency of Follow-up: moderate   Chewing or Swallowing Difficulty?: No Chewing or swallowing difficulty  Estimated/Assessed Needs  RMR ([place]-St. Jeor Equation): 2017   Total Ve: 5.5 L/m Temp: 98 °F (36.7 °C)Oral  Weight Used For Calorie Calculations: 87.7 kg (193 lb 5.5 oz) (adjusted weight used)   Energy Need Method: Kcal/kg Energy Calorie Requirements (kcal): 8722-2976  Weight Used For Protein Calculations: 87.7 kg (193 lb 5.5 oz)  Protein Requirements:        RDA Method (mL): 2192     Nutrition Prescription / Recommendations  Recommendation/Intervention: Recommend continue with Regular diet as ordered + Ensure Clear TID given pt with continued poor PO intakes  (25-50% per flowsheets). Not adequate to meet nutritional needs. Continue Charanjit BID to aid in wound healing. Consider addition of 500 mg Vit C BID + 220 mg ZnSO4 BID + mvi daily to aid in wound healing as well.  Goals: tolerance of diet, intake % + supplements, weight maintenance, wound healing  Nutrition Goal Status: progressing towards goal  Communication of RD Recs: discussed on rounds  Recommended Diet: Regular  Recommended Oral Supplement: Ensure Clear [240 kcals, 8g Protein, 52g Carbs(0g Fiber, 30g Sugar), 0g Fat] three times daily  Is Nutrition Support Recommended: No  Is Education Recommended: No  Monitor and Evaluation  % current Intake: P.O. intake of 25 - 50 %  % intake to meet estimated needs: 75 - 100 %  Current Medical Diagnosis and Past Medical History     Past Medical History:   Diagnosis Date    Hypertension     Mixed obsessional thoughts and acts 01/24/2017    Tourette syndrome     Tourette's syndrome 01/24/2017     Nutrition/Diet History  Spiritual, Cultural Beliefs, Hoahaoism Practices, Values that Affect Care: no  Food Allergies: NKFA  Factors Affecting Nutritional Intake: None identified at this time  Lab/Procedures/Meds  Recent Labs   Lab 04/07/23  0420      K 3.5   BUN 29*   CREATININE 1.67*   GLU 94   CALCIUM 9.7          Note: Elevated BUN - pt noted to have EVELYN.     Last A1c:   Lab Results   Component Value Date    HGBA1C 5.5 12/13/2021     Lab Results   Component Value Date    RBC 2.87 (L) 04/07/2023    HGB 9.7 (L) 04/07/2023    HCT 29.7 (L) 04/07/2023    .5 (H) 04/07/2023    MCH 33.8 (H) 04/07/2023    MCHC 32.7 04/07/2023     Pertinent Labs Reviewed: reviewed  Pertinent Medications Reviewed: reviewed  Pertinent Medications Comments: amlodipine, aspirin, clorazepate, divalproex, enoxaparin, escitalopram, fetanyl, hydralazine, lisinopril, pantoprazole    Anthropometrics  Temp: 98 °F (36.7 °C)  Height: 6' (182.9 cm)  Height (inches): 72 in  Weight Method: Bed  Scale  Weight: 109.4 kg (241 lb 2.9 oz)  Weight (lb): 241.19 lb  Ideal Body Weight (IBW), Male: 178 lb  % Ideal Body Weight, Male (lb): 142.06 %  BMI (Calculated): 32.7  BMI Grade: 30 - 34.9- obesity - grade I     Nutrition by Nursing  Diet/Nutrition Received: regular  Intake (%): 50%     Diet/Feeding Tolerance: poor  Last Bowel Movement: 04/06/23  [REMOVED]      NG/OG Tube 03/23/23 0014 [place] sump Right nostril-Feeding Type: by pump  [REMOVED]      NG/OG Tube 03/23/23 0014 [place] sump Right nostril-Current Rate (mL/hr): 42 mL/hr  [REMOVED]      NG/OG Tube 03/23/23 0014 [place] sump Right nostril-Goal Rate (mL/hr): 42 mL/hr  [REMOVED]      NG/OG Tube 03/23/23 0014 [place] sump Right nostril-Formula Name: isosource 1.5  Nutrition Follow-Up  RD Follow-up?: Yes

## 2023-04-08 NOTE — NURSING
Called spoke with Vale with  reports that she has faxed every thing over to [facility] and that patient should not need a packet if they come to get him. Reports that she hasn't heard anything from [facility] at this time. RN hasn't heard anything from [facility] at this time. Vale reports that she will be in to office in the morning and the nurse will need to get with her tomorrow regarding patient.

## 2023-04-08 NOTE — PROGRESS NOTES
[facility] - Short Stay Unit  Adult Nutrition  Consult Note         Reason for Assessment  Reason For Assessment: RD follow-up + consult for poor nutrition/PO intake + wounds  Nutrition Risk Screen: no indicators present    Assessment and Plan  RD following pt.     Current weight is 241 lbs.     Pt is currently receiving a Regular diet + Ensure Clear  TID+ Charanjit BID. Per flowsheets, pt consuming 25-50% of meals. Intake is not adequate to meet nutritional needs. RD has visited with pt to obtain food preferences to increase kcal/pro intake. RD to add to diet order.     If poor PO intake continues, consider addition of appetite stimulant.     Recommend addition of Charanjit BID to aid in wound healing. Consider addition of 500 mg Vit C BID + 220 mg ZnSO4 BID + mvi daily to aid in wound healing as well.    Last BM 4/4 per flowsheets. Labs/meds reviewed. RD following.       Malnutrition  Is Patient Malnourished: No  Nutrition Diagnosis  Inadequate energy intake   related to decreased appetite/PO intake as evidenced by consuming 25-50% of meals, not adequate to meet nutritional needs.      Nutrition Diagnosis Status: continues     Nutrition Risk  Level of Risk/Frequency of Follow-up: moderate   Chewing or Swallowing Difficulty?: No Chewing or swallowing difficulty  Estimated/Assessed Needs  RMR ([place]-St. Jeor Equation): 2017   Total Ve: 5.5 L/m Temp: 98 °F (36.7 °C)Oral  Weight Used For Calorie Calculations: 87.7 kg (193 lb 5.5 oz) (adjusted weight used)   Energy Need Method: Kcal/kg Energy Calorie Requirements (kcal): 7484-7795  Weight Used For Protein Calculations: 87.7 kg (193 lb 5.5 oz)  Protein Requirements:        RDA Method (mL): 2192     Nutrition Prescription / Recommendations  Recommendation/Intervention: Recommend continue with Regular diet as ordered + Ensure Max Protein TID given pt with continued poor PO intakes (25-50% per flowsheets). Not adequate to meet nutritional needs. Wound noted per  flowsheets, recommend addition of Charanjit BID to aid in wound healing. Consider addition of 500 mg Vit C BID + 220 mg ZnSO4 BID + mvi daily to aid in wound healing as well.  Goals: tolerance of diet, intake % + supplements, weight maintenance, wound healing  Nutrition Goal Status: progressing towards goal  Communication of RD Recs: discussed on rounds  Current Diet Order: Regular diet  Current Nutrition Support Formula Ordered: Jevity 1.5  Current Nutrition Support Rate Ordered: 42 (ml)  Current Nutrition Support Frequency Ordered: continuous  Oral Nutrition Supplement: Ensure Max Protein TID  Recommended Diet: Regular  Recommended Oral Supplement: Ensure Clear [240 kcals, 8g Protein, 52g Carbs(0g Fiber, 30g Sugar), 0g Fat] three times daily  Is Nutrition Support Recommended: No  Is Education Recommended: No  Monitor and Evaluation  % current Intake: P.O. intake of 25 - 50 %  % intake to meet estimated needs: 75 - 100 %  Food and Nutrient Intake: food and beverage intake  Food and Nutrient Adminstration: diet order  Anthropometric Measurements: weight, weight change, body mass index  Biochemical Data, Medical Tests and Procedures: electrolyte and renal panel, lipid profile, gastrointestinal profile, glucose/endocrine profile, inflammatory profile  Nutrition-Focused Physical Findings: overall appearance, extremities, muscles and bones, head and eyes, skin  Enteral Calories (kcal): 1632  Enteral Protein (gm): 94  Enteral (Free Water) Fluid (mL): 693  Free Water Flush Fluid (mL): 1440  Other Calories (kcal): 726  Total Calories (kcal): 2358  % Kcal Needs: 100  Total Protein (gm): 94  % Protein Needs: 100  Tolerance: tolerating  Current Medical Diagnosis and Past Medical History     Past Medical History:   Diagnosis Date    Hypertension     Mixed obsessional thoughts and acts 01/24/2017    Tourette syndrome     Tourette's syndrome 01/24/2017     Nutrition/Diet History  Spiritual, Cultural Beliefs, Gnosticist  Practices, Values that Affect Care: no  Food Allergies: NKFA  Factors Affecting Nutritional Intake: None identified at this time  Lab/Procedures/Meds  Recent Labs   Lab 04/07/23  0420      K 3.5   BUN 29*   CREATININE 1.67*   GLU 94   CALCIUM 9.7          Note: Low K - replete to WNL as needed. Elevated BUN - pt noted to have EVELYN. Elevated glucose - no PMH of DM. Elevated alk phos. Low albumin - possibly related to wounds/poor PO intake. Elevated globulin.     Last A1c:   Lab Results   Component Value Date    HGBA1C 5.5 12/13/2021     Lab Results   Component Value Date    RBC 2.87 (L) 04/07/2023    HGB 9.7 (L) 04/07/2023    HCT 29.7 (L) 04/07/2023    .5 (H) 04/07/2023    MCH 33.8 (H) 04/07/2023    MCHC 32.7 04/07/2023     Pertinent Labs Reviewed: reviewed  Pertinent Medications Reviewed: reviewed  Pertinent Medications Comments: amlodipine, aspirin, clorazepate, divalproex, enoxaparin, escitalopram, fetanyl, hydralazine, lisinopril, pantoprazole    Anthropometrics  Temp: 98 °F (36.7 °C)  Height: 6' (182.9 cm)  Height (inches): 72 in  Weight Method: Bed Scale  Weight: 109.4 kg (241 lb 2.9 oz)  Weight (lb): 241.19 lb  Ideal Body Weight (IBW), Male: 178 lb  % Ideal Body Weight, Male (lb): 142.06 %  BMI (Calculated): 32.7  BMI Grade: 30 - 34.9- obesity - grade I     Nutrition by Nursing  Diet/Nutrition Received: regular  Intake (%): 50%     Diet/Feeding Tolerance: poor  Last Bowel Movement: 04/06/23  [REMOVED]      NG/OG Tube 03/23/23 0014 [place] sump Right nostril-Feeding Type: by pump  [REMOVED]      NG/OG Tube 03/23/23 0014 [place] sump Right nostril-Current Rate (mL/hr): 42 mL/hr  [REMOVED]      NG/OG Tube 03/23/23 0014 [place] sump Right nostril-Goal Rate (mL/hr): 42 mL/hr  [REMOVED]      NG/OG Tube 03/23/23 0014 [place] navarrete Right nostril-Formula Name: isosource 1.5  Nutrition Follow-Up  RD Follow-up?: Yes

## 2023-04-08 NOTE — SUBJECTIVE & OBJECTIVE
Interval History: naeo    Review of Systems   Constitutional:  Positive for fatigue. Negative for appetite change and fever.   HENT:  Negative for congestion, hearing loss and trouble swallowing.    Respiratory:  Negative for chest tightness and wheezing.    Cardiovascular:  Negative for chest pain and palpitations.   Gastrointestinal:  Negative for abdominal pain, constipation and nausea.   Genitourinary:  Negative for difficulty urinating and dysuria.   Musculoskeletal:  Negative for back pain and neck stiffness.   Skin:  Negative for pallor and rash.   Neurological:  Negative for dizziness, speech difficulty and headaches.   Psychiatric/Behavioral:  Positive for sleep disturbance. Negative for confusion and suicidal ideas.    Objective:     Vital Signs (Most Recent):  Temp: 98.4 °F (36.9 °C) (04/08/23 1210)  Pulse: 102 (04/08/23 1210)  Resp: 20 (04/08/23 1210)  BP: 127/77 (04/08/23 1210)  SpO2: 97 % (04/08/23 1210)   Vital Signs (24h Range):  Temp:  [97.9 °F (36.6 °C)-99.2 °F (37.3 °C)] 98.4 °F (36.9 °C)  Pulse:  [] 102  Resp:  [18-20] 20  SpO2:  [95 %-97 %] 97 %  BP: (114-146)/(68-85) 127/77     Weight: 109.4 kg (241 lb 2.9 oz)  Body mass index is 32.71 kg/m².    Intake/Output Summary (Last 24 hours) at 4/8/2023 1602  Last data filed at 4/8/2023 1300  Gross per 24 hour   Intake 0 ml   Output --   Net 0 ml        Physical Exam  Vitals reviewed.   Constitutional:       General: He is awake. He is not in acute distress.     Appearance: He is well-developed. He is obese. He is not toxic-appearing.   HENT:      Head: Normocephalic.      Nose: Nose normal.      Mouth/Throat:      Pharynx: Oropharynx is clear.   Eyes:      Extraocular Movements: Extraocular movements intact.      Pupils: Pupils are equal, round, and reactive to light.   Neck:      Thyroid: No thyroid mass.      Vascular: No carotid bruit.   Cardiovascular:      Rate and Rhythm: Normal rate and regular rhythm.      Pulses: Normal pulses.       Heart sounds: Normal heart sounds. No murmur heard.  Pulmonary:      Effort: Pulmonary effort is normal.      Breath sounds: Normal breath sounds and air entry. No wheezing.   Abdominal:      General: Bowel sounds are normal. There is no distension.      Palpations: Abdomen is soft.      Tenderness: There is no abdominal tenderness.   Musculoskeletal:         General: Normal range of motion.      Cervical back: Neck supple. No rigidity.   Skin:     General: Skin is warm.      Coloration: Skin is not jaundiced.      Findings: No lesion.   Neurological:      General: No focal deficit present.      Mental Status: He is alert and oriented to person, place, and time.      Cranial Nerves: No cranial nerve deficit.   Psychiatric:         Attention and Perception: Attention normal.         Mood and Affect: Mood normal.         Behavior: Behavior normal. Behavior is cooperative.         Thought Content: Thought content normal.         Cognition and Memory: Cognition normal.       Significant Labs: All pertinent labs within the past 24 hours have been reviewed.    Significant Imaging: I have reviewed all pertinent imaging results/findings within the past 24 hours.

## 2023-04-08 NOTE — PLAN OF CARE
SS faxed records again and spoke with [facility] and evaluation has been completed, just awaiting records to complete admission process.

## 2023-04-08 NOTE — PROGRESS NOTES
[facility] - Short Stay [facility] Medicine  Progress Note    Patient Name: Carol Ladd Jr.  MRN: [id number]  Patient Class: IP- Inpatient   Admission Date: 3/21/2023  Length of Stay: 18 days  Attending Physician: Conner Moy DO  Primary Care Provider: Raymundo Cabrera MD        Subjective:     Principal Problem:Alcohol withdrawal syndrome with complication        HPI:    Principal Problem: Alcohol withdrawal syndrome with complication     Subjective:      HPI:  48 y/o male who was transferred to [facility] for increased agitation from [place]. He was admitted there 48 hours ago for ETOH detox. He drinks over 1/5 of alcohol daily and has for several years to help with his tourette's.  Family states that he helps better than medication.  He has also been on Subaxone for several years as well.  His last drink for 03/19. ETOH level was over 400 at [facility].  On arrival to the ER his HR was 160 and he was diaphoretic.  Lactic acid was 10 on ABG and CK 3,333 and Cr 4.38. He was treated with 3 liters NS. He then became hypotensive and was started on Levophed.   On exam he is lethargic due to receiving 6 mg IV ativan but he states that his muscle spasm/contractions are similar to his tics but much worse.  He was started on several psych medications at [facility] but was not taking any at home.      PMH - HTN, ODD, tourette's, opoid abuse and ETOH abuse.                Past Medical History:   Diagnosis Date    Hypertension      Mixed obsessional thoughts and acts 01/24/2017    Tourette syndrome      Tourette's syndrome 01/24/2017         History reviewed. No pertinent surgical history.          Review of patient's allergies indicates:   Allergen Reactions    Haldol [haloperidol lactate]           Family History         Problem Relation (Age of Onset)     OCD Mother, Sister, Daughter     Tics Father, Paternal Uncle, Cousin                   Tobacco Use    Smoking status: Never    Smokeless  tobacco: Not on file   Substance and Sexual Activity    Alcohol use: Yes       Comment: reports drinking a 5th of alcohol/day    Drug use: Not Currently    Sexual activity: Not Currently           Overview/Hospital Course:  04/04 - Records reviewed. Admitted from [facility] 03/21 after noted hypotension and altered mental status. There secondary EtoH abuse. Had abnoranl CXR. Was intubated for several days then HFNC. Since mental status back to baseline and on 2L BNC.   Past Medical History:   Diagnosis Date    Hypertension      Mixed obsessional thoughts and acts 01/24/2017    Tourette syndrome      Tourette's syndrome    -Also THOMAS. Wears CPAP but didn't bring with him to [facility]. Nurse staff tell me he is a big snorer and has apnea on floor droping sats while asleep. CXR better. Increase activity; told off balance.   Look into Dc needs.  4/5- still intermittently tachycardic.  Respiratory status improved. Doing well  4/6-denied by [facility], medically stable for discharge.  Can go once ride arranged    4/7-medically stable, after discussion with family decided to go to [facility] rehab. Can DC once ride arrives    4/8- patient seen examined today sitting comfortably on the edge of the bed with no complaints.  During the patient's admission he is undergone echocardiogram which showed The left ventricle is normal in size with hyperdynamic systolic function.   The estimated ejection fraction is 75%.   Normal left ventricular diastolic function.   Mild right ventricular enlargement.   Mild right atrial enlargement.   Mild tricuspid regurgitation.   Elevated central venous pressure (15 mmHg).   The estimated PA systolic pressure is 46 mmHg.   There is pulmonary hypertension.   Sinus rhythm, 120.   Trivial pericardial effusion.    - EKG obtained on this visit shows sinus tachycardia without any other acute EKG changes.  - troponin was initially elevated on 03/25/2023 upon patient's admission during  his acute withdrawal episode.    - has since proven to be stable from a cardiac standpoint, will a advise clinical cardiac follow-up secondary to extensive substance abuse.      Interval History: naeo    Review of Systems   Constitutional:  Positive for fatigue. Negative for appetite change and fever.   HENT:  Negative for congestion, hearing loss and trouble swallowing.    Respiratory:  Negative for chest tightness and wheezing.    Cardiovascular:  Negative for chest pain and palpitations.   Gastrointestinal:  Negative for abdominal pain, constipation and nausea.   Genitourinary:  Negative for difficulty urinating and dysuria.   Musculoskeletal:  Negative for back pain and neck stiffness.   Skin:  Negative for pallor and rash.   Neurological:  Negative for dizziness, speech difficulty and headaches.   Psychiatric/Behavioral:  Positive for sleep disturbance. Negative for confusion and suicidal ideas.    Objective:     Vital Signs (Most Recent):  Temp: 98.4 °F (36.9 °C) (04/08/23 1210)  Pulse: 102 (04/08/23 1210)  Resp: 20 (04/08/23 1210)  BP: 127/77 (04/08/23 1210)  SpO2: 97 % (04/08/23 1210)   Vital Signs (24h Range):  Temp:  [97.9 °F (36.6 °C)-99.2 °F (37.3 °C)] 98.4 °F (36.9 °C)  Pulse:  [] 102  Resp:  [18-20] 20  SpO2:  [95 %-97 %] 97 %  BP: (114-146)/(68-85) 127/77     Weight: 109.4 kg (241 lb 2.9 oz)  Body mass index is 32.71 kg/m².    Intake/Output Summary (Last 24 hours) at 4/8/2023 1602  Last data filed at 4/8/2023 1300  Gross per 24 hour   Intake 0 ml   Output --   Net 0 ml        Physical Exam  Vitals reviewed.   Constitutional:       General: He is awake. He is not in acute distress.     Appearance: He is well-developed. He is obese. He is not toxic-appearing.   HENT:      Head: Normocephalic.      Nose: Nose normal.      Mouth/Throat:      Pharynx: Oropharynx is clear.   Eyes:      Extraocular Movements: Extraocular movements intact.      Pupils: Pupils are equal, round, and reactive to light.    Neck:      Thyroid: No thyroid mass.      Vascular: No carotid bruit.   Cardiovascular:      Rate and Rhythm: Normal rate and regular rhythm.      Pulses: Normal pulses.      Heart sounds: Normal heart sounds. No murmur heard.  Pulmonary:      Effort: Pulmonary effort is normal.      Breath sounds: Normal breath sounds and air entry. No wheezing.   Abdominal:      General: Bowel sounds are normal. There is no distension.      Palpations: Abdomen is soft.      Tenderness: There is no abdominal tenderness.   Musculoskeletal:         General: Normal range of motion.      Cervical back: Neck supple. No rigidity.   Skin:     General: Skin is warm.      Coloration: Skin is not jaundiced.      Findings: No lesion.   Neurological:      General: No focal deficit present.      Mental Status: He is alert and oriented to person, place, and time.      Cranial Nerves: No cranial nerve deficit.   Psychiatric:         Attention and Perception: Attention normal.         Mood and Affect: Mood normal.         Behavior: Behavior normal. Behavior is cooperative.         Thought Content: Thought content normal.         Cognition and Memory: Cognition normal.       Significant Labs: All pertinent labs within the past 24 hours have been reviewed.    Significant Imaging: I have reviewed all pertinent imaging results/findings within the past 24 hours.      Assessment/Plan:      Obstructive sleep apnea    Continue cpap    Depression  Patient has persistent depression which is moderate and is currently uncontrolled. Will Continue anti-depressant medications. We will not consult psychiatry at this time. Patient does not display psychosis at this time. Continue to monitor closely and adjust plan of care as needed.    No SI/HI  Safe for DC home        HTN (hypertension)    pcp follow up  Cr ticked up after starting acei  Hold for now, increase amlo    Tourette syndrome  stable        VTE Risk Mitigation (From admission, onward)         Ordered      enoxaparin injection 40 mg  Daily         03/26/23 1454                Discharge Planning   JEZ: 4/7/2023     Code Status: Not on file   Is the patient medically ready for discharge?:     Reason for patient still in hospital (select all that apply): Pending disposition  Discharge Plan A: Rehab                  Conner Moy DO  Department of Hospital Medicine   [facility] - Short Stay Unit

## 2023-04-09 PROCEDURE — 99232 SBSQ HOSP IP/OBS MODERATE 35: CPT | Mod: ,,, | Performed by: FAMILY MEDICINE

## 2023-04-09 PROCEDURE — 25000003 PHARM REV CODE 250: Performed by: HOSPITALIST

## 2023-04-09 PROCEDURE — 99232 PR SUBSEQUENT HOSPITAL CARE,LEVL II: ICD-10-PCS | Mod: ,,, | Performed by: FAMILY MEDICINE

## 2023-04-09 PROCEDURE — 99900035 HC TECH TIME PER 15 MIN (STAT)

## 2023-04-09 PROCEDURE — 25000003 PHARM REV CODE 250: Performed by: NURSE PRACTITIONER

## 2023-04-09 PROCEDURE — 25000003 PHARM REV CODE 250: Performed by: INTERNAL MEDICINE

## 2023-04-09 PROCEDURE — 25000003 PHARM REV CODE 250: Performed by: FAMILY MEDICINE

## 2023-04-09 PROCEDURE — 11000001 HC ACUTE MED/SURG PRIVATE ROOM

## 2023-04-09 PROCEDURE — 25000003 PHARM REV CODE 250: Performed by: STUDENT IN AN ORGANIZED HEALTH CARE EDUCATION/TRAINING PROGRAM

## 2023-04-09 PROCEDURE — 94660 CPAP INITIATION&MGMT: CPT

## 2023-04-09 PROCEDURE — 94761 N-INVAS EAR/PLS OXIMETRY MLT: CPT

## 2023-04-09 PROCEDURE — 63600175 PHARM REV CODE 636 W HCPCS: Performed by: HOSPITALIST

## 2023-04-09 PROCEDURE — 63600175 PHARM REV CODE 636 W HCPCS: Performed by: FAMILY MEDICINE

## 2023-04-09 PROCEDURE — 63600175 PHARM REV CODE 636 W HCPCS: Performed by: NURSE PRACTITIONER

## 2023-04-09 RX ORDER — ONDANSETRON 2 MG/ML
4 INJECTION INTRAMUSCULAR; INTRAVENOUS EVERY 6 HOURS PRN
Status: DISCONTINUED | OUTPATIENT
Start: 2023-04-09 | End: 2023-04-10 | Stop reason: HOSPADM

## 2023-04-09 RX ORDER — MORPHINE SULFATE 4 MG/ML
8 INJECTION, SOLUTION INTRAMUSCULAR; INTRAVENOUS ONCE
Status: COMPLETED | OUTPATIENT
Start: 2023-04-09 | End: 2023-04-09

## 2023-04-09 RX ORDER — DIPHENHYDRAMINE HYDROCHLORIDE 50 MG/ML
50 INJECTION INTRAMUSCULAR; INTRAVENOUS ONCE
Status: COMPLETED | OUTPATIENT
Start: 2023-04-09 | End: 2023-04-09

## 2023-04-09 RX ADMIN — DIPHENHYDRAMINE HYDROCHLORIDE 50 MG: 50 INJECTION INTRAMUSCULAR; INTRAVENOUS at 07:04

## 2023-04-09 RX ADMIN — Medication 4 CAPSULE: at 08:04

## 2023-04-09 RX ADMIN — ALPRAZOLAM 0.5 MG: 0.5 TABLET ORAL at 06:04

## 2023-04-09 RX ADMIN — ALPRAZOLAM 0.5 MG: 0.5 TABLET ORAL at 12:04

## 2023-04-09 RX ADMIN — HYDRALAZINE HYDROCHLORIDE 25 MG: 25 TABLET ORAL at 05:04

## 2023-04-09 RX ADMIN — PANTOPRAZOLE SODIUM 40 MG: 40 TABLET, DELAYED RELEASE ORAL at 09:04

## 2023-04-09 RX ADMIN — ENOXAPARIN SODIUM 40 MG: 100 INJECTION SUBCUTANEOUS at 06:04

## 2023-04-09 RX ADMIN — ASPIRIN 81 MG: 81 TABLET, DELAYED RELEASE ORAL at 09:04

## 2023-04-09 RX ADMIN — AMLODIPINE BESYLATE 10 MG: 10 TABLET ORAL at 09:04

## 2023-04-09 RX ADMIN — ESCITALOPRAM OXALATE 10 MG: 10 TABLET ORAL at 09:04

## 2023-04-09 RX ADMIN — LOPERAMIDE HYDROCHLORIDE 2 MG: 2 CAPSULE ORAL at 09:04

## 2023-04-09 RX ADMIN — LOPERAMIDE HYDROCHLORIDE 2 MG: 2 CAPSULE ORAL at 12:04

## 2023-04-09 RX ADMIN — HYDRALAZINE HYDROCHLORIDE 25 MG: 25 TABLET ORAL at 02:04

## 2023-04-09 RX ADMIN — CLORAZEPATE DIPOTASSIUM 3.75 MG: 3.75 TABLET ORAL at 09:04

## 2023-04-09 RX ADMIN — DIVALPROEX SODIUM 250 MG: 250 TABLET, DELAYED RELEASE ORAL at 09:04

## 2023-04-09 RX ADMIN — DIVALPROEX SODIUM 250 MG: 250 TABLET, DELAYED RELEASE ORAL at 02:04

## 2023-04-09 RX ADMIN — DIVALPROEX SODIUM 250 MG: 250 TABLET, DELAYED RELEASE ORAL at 05:04

## 2023-04-09 RX ADMIN — HYDRALAZINE HYDROCHLORIDE 25 MG: 25 TABLET ORAL at 09:04

## 2023-04-09 RX ADMIN — ONDANSETRON HYDROCHLORIDE 4 MG: 2 SOLUTION INTRAMUSCULAR; INTRAVENOUS at 10:04

## 2023-04-09 RX ADMIN — MORPHINE SULFATE 8 MG: 4 INJECTION, SOLUTION INTRAMUSCULAR; INTRAVENOUS at 07:04

## 2023-04-09 RX ADMIN — LOPERAMIDE HYDROCHLORIDE 2 MG: 2 CAPSULE ORAL at 02:04

## 2023-04-09 RX ADMIN — Medication 4 CAPSULE: at 06:04

## 2023-04-09 RX ADMIN — ALPRAZOLAM 0.5 MG: 0.5 TABLET ORAL at 11:04

## 2023-04-09 RX ADMIN — Medication 4 CAPSULE: at 12:04

## 2023-04-09 NOTE — PLAN OF CARE
Problem: Adult Inpatient Plan of Care  Goal: Plan of Care Review  Outcome: Ongoing, Progressing     Problem: Fluid and Electrolyte Imbalance (Acute Kidney Injury/Impairment)  Goal: Fluid and Electrolyte Balance  Outcome: Ongoing, Progressing     Problem: Oral Intake Inadequate (Acute Kidney Injury/Impairment)  Goal: Optimal Nutrition Intake  Outcome: Ongoing, Progressing     Problem: Renal Function Impairment (Acute Kidney Injury/Impairment)  Goal: Effective Renal Function  Outcome: Ongoing, Progressing     Problem: Skin Injury Risk Increased  Goal: Skin Health and Integrity  Outcome: Ongoing, Progressing     Problem: Fall Injury Risk  Goal: Absence of Fall and Fall-Related Injury  Outcome: Ongoing, Progressing

## 2023-04-09 NOTE — PROGRESS NOTES
[facility] Medicine  Progress Note    Patient Name: Carol Ladd Jr.  MRN: [id number]  Patient Class: IP- Inpatient   Admission Date: 3/21/2023  Length of Stay: 19 days  Attending Physician: Conner Moy DO  Primary Care Provider: Raymundo Cabrera MD        Subjective:     Principal Problem:Alcohol withdrawal syndrome with complication        HPI:    Principal Problem: Alcohol withdrawal syndrome with complication     Subjective:      HPI:  46 y/o male who was transferred to [facility] for increased agitation from [place]. He was admitted there 48 hours ago for ETOH detox. He drinks over 1/5 of alcohol daily and has for several years to help with his tourette's.  Family states that he helps better than medication.  He has also been on Subaxone for several years as well.  His last drink for 03/19. ETOH level was over 400 at [facility].  On arrival to the ER his HR was 160 and he was diaphoretic.  Lactic acid was 10 on ABG and CK 3,333 and Cr 4.38. He was treated with 3 liters NS. He then became hypotensive and was started on Levophed.   On exam he is lethargic due to receiving 6 mg IV ativan but he states that his muscle spasm/contractions are similar to his tics but much worse.  He was started on several psych medications at [facility] but was not taking any at home.      PMH - HTN, ODD, tourette's, opoid abuse and ETOH abuse.                Past Medical History:   Diagnosis Date    Hypertension      Mixed obsessional thoughts and acts 01/24/2017    Tourette syndrome      Tourette's syndrome 01/24/2017         History reviewed. No pertinent surgical history.          Review of patient's allergies indicates:   Allergen Reactions    Haldol [haloperidol lactate]           Family History         Problem Relation (Age of Onset)     OCD Mother, Sister, Daughter     Tics Father, Paternal Uncle, Cousin                   Tobacco Use    Smoking status: Never    Smokeless  tobacco: Not on file   Substance and Sexual Activity    Alcohol use: Yes       Comment: reports drinking a 5th of alcohol/day    Drug use: Not Currently    Sexual activity: Not Currently           Overview/Hospital Course:  04/04 - Records reviewed. Admitted from [facility] 03/21 after noted hypotension and altered mental status. There secondary EtoH abuse. Had abnoranl CXR. Was intubated for several days then HFNC. Since mental status back to baseline and on 2L BNC.   Past Medical History:   Diagnosis Date    Hypertension      Mixed obsessional thoughts and acts 01/24/2017    Tourette syndrome      Tourette's syndrome    -Also THOMAS. Wears CPAP but didn't bring with him to [facility]. Nurse staff tell me he is a big snorer and has apnea on floor droping sats while asleep. CXR better. Increase activity; told off balance.   Look into Dc needs.  4/5- still intermittently tachycardic.  Respiratory status improved. Doing well  4/6-denied by [place], medically stable for discharge.  Can go once ride arranged    4/7-medically stable, after discussion with family decided to go to [facility]. Can DC once ride arrives    4/8- patient seen examined today sitting comfortably on the edge of the bed with no complaints.  During the patient's admission he is undergone echocardiogram which showed The left ventricle is normal in size with hyperdynamic systolic function.   The estimated ejection fraction is 75%.   Normal left ventricular diastolic function.   Mild right ventricular enlargement.   Mild right atrial enlargement.   Mild tricuspid regurgitation.   Elevated central venous pressure (15 mmHg).   The estimated PA systolic pressure is 46 mmHg.   There is pulmonary hypertension.   Sinus rhythm, 120.   Trivial pericardial effusion.    - EKG obtained on this visit shows sinus tachycardia without any other acute EKG changes.  - troponin was initially elevated on 03/25/2023 upon patient's admission during  his acute withdrawal episode.    - has since proven to be stable from a cardiac standpoint, will a advise clinical cardiac follow-up secondary to extensive substance abuse.  4/9- patient seen examined resting comfortably in bed today, in no acute distress with no acute events overnight.  Patient remains medically ready for discharge.  We are having trouble with displacement.  Rehab facility in [place] has denied patient's admission secondary to insurance.  Case management now checking with [facility] locally.      Interval History: naeo    Review of Systems   Constitutional:  Positive for fatigue. Negative for appetite change and fever.   HENT:  Negative for congestion, hearing loss and trouble swallowing.    Respiratory:  Negative for chest tightness and wheezing.    Cardiovascular:  Negative for chest pain and palpitations.   Gastrointestinal:  Negative for abdominal pain, constipation and nausea.   Genitourinary:  Negative for difficulty urinating and dysuria.   Musculoskeletal:  Negative for back pain and neck stiffness.   Skin:  Negative for pallor and rash.   Neurological:  Negative for dizziness, speech difficulty and headaches.   Psychiatric/Behavioral:  Positive for sleep disturbance. Negative for confusion and suicidal ideas.    Objective:     Vital Signs (Most Recent):  Temp: 99 °F (37.2 °C) (04/09/23 1629)  Pulse: 94 (04/09/23 1629)  Resp: 18 (04/09/23 1629)  BP: 116/62 (04/09/23 1629)  SpO2: 95 % (04/09/23 1629)   Vital Signs (24h Range):  Temp:  [97.8 °F (36.6 °C)-99 °F (37.2 °C)] 99 °F (37.2 °C)  Pulse:  [] 94  Resp:  [18-22] 18  SpO2:  [95 %-98 %] 95 %  BP: (116-135)/(62-87) 116/62     Weight: 109.4 kg (241 lb 2.9 oz)  Body mass index is 32.71 kg/m².    Intake/Output Summary (Last 24 hours) at 4/9/2023 1767  Last data filed at 4/9/2023 0649  Gross per 24 hour   Intake 840 ml   Output --   Net 840 ml        Physical Exam  Vitals reviewed.   Constitutional:       General: He is awake. He is not in  acute distress.     Appearance: He is well-developed. He is obese. He is not toxic-appearing.   HENT:      Head: Normocephalic.      Nose: Nose normal.      Mouth/Throat:      Pharynx: Oropharynx is clear.   Eyes:      Extraocular Movements: Extraocular movements intact.      Pupils: Pupils are equal, round, and reactive to light.   Neck:      Thyroid: No thyroid mass.      Vascular: No carotid bruit.   Cardiovascular:      Rate and Rhythm: Normal rate and regular rhythm.      Pulses: Normal pulses.      Heart sounds: Normal heart sounds. No murmur heard.  Pulmonary:      Effort: Pulmonary effort is normal.      Breath sounds: Normal breath sounds and air entry. No wheezing.   Abdominal:      General: Bowel sounds are normal. There is no distension.      Palpations: Abdomen is soft.      Tenderness: There is no abdominal tenderness.   Musculoskeletal:         General: Normal range of motion.      Cervical back: Neck supple. No rigidity.   Skin:     General: Skin is warm.      Coloration: Skin is not jaundiced.      Findings: No lesion.   Neurological:      General: No focal deficit present.      Mental Status: He is alert and oriented to person, place, and time.      Cranial Nerves: No cranial nerve deficit.   Psychiatric:         Attention and Perception: Attention normal.         Mood and Affect: Mood normal.         Behavior: Behavior normal. Behavior is cooperative.         Thought Content: Thought content normal.         Cognition and Memory: Cognition normal.       Significant Labs: All pertinent labs within the past 24 hours have been reviewed.    Significant Imaging: I have reviewed all pertinent imaging results/findings within the past 24 hours.      Assessment/Plan:      Obstructive sleep apnea    Continue cpap    Depression  Patient has persistent depression which is moderate and is currently uncontrolled. Will Continue anti-depressant medications. We will not consult psychiatry at this time. Patient does  not display psychosis at this time. Continue to monitor closely and adjust plan of care as needed.    No SI/HI  Safe for DC home        HTN (hypertension)    pcp follow up  Cr ticked up after starting acei  Hold for now, increase amlo    Tourette syndrome  stable        VTE Risk Mitigation (From admission, onward)         Ordered     enoxaparin injection 40 mg  Daily         03/26/23 1454                Discharge Planning   JEZ: 4/7/2023     Code Status: Not on file   Is the patient medically ready for discharge?:     Reason for patient still in hospital (select all that apply): Pending disposition  Discharge Plan A: Rehab                  Conner Moy DO  Department of Hospital Medicine   [facility]

## 2023-04-09 NOTE — SUBJECTIVE & OBJECTIVE
Interval History: naeo    Review of Systems   Constitutional:  Positive for fatigue. Negative for appetite change and fever.   HENT:  Negative for congestion, hearing loss and trouble swallowing.    Respiratory:  Negative for chest tightness and wheezing.    Cardiovascular:  Negative for chest pain and palpitations.   Gastrointestinal:  Negative for abdominal pain, constipation and nausea.   Genitourinary:  Negative for difficulty urinating and dysuria.   Musculoskeletal:  Negative for back pain and neck stiffness.   Skin:  Negative for pallor and rash.   Neurological:  Negative for dizziness, speech difficulty and headaches.   Psychiatric/Behavioral:  Positive for sleep disturbance. Negative for confusion and suicidal ideas.    Objective:     Vital Signs (Most Recent):  Temp: 99 °F (37.2 °C) (04/09/23 1629)  Pulse: 94 (04/09/23 1629)  Resp: 18 (04/09/23 1629)  BP: 116/62 (04/09/23 1629)  SpO2: 95 % (04/09/23 1629)   Vital Signs (24h Range):  Temp:  [97.8 °F (36.6 °C)-99 °F (37.2 °C)] 99 °F (37.2 °C)  Pulse:  [] 94  Resp:  [18-22] 18  SpO2:  [95 %-98 %] 95 %  BP: (116-135)/(62-87) 116/62     Weight: 109.4 kg (241 lb 2.9 oz)  Body mass index is 32.71 kg/m².    Intake/Output Summary (Last 24 hours) at 4/9/2023 1738  Last data filed at 4/9/2023 0649  Gross per 24 hour   Intake 840 ml   Output --   Net 840 ml        Physical Exam  Vitals reviewed.   Constitutional:       General: He is awake. He is not in acute distress.     Appearance: He is well-developed. He is obese. He is not toxic-appearing.   HENT:      Head: Normocephalic.      Nose: Nose normal.      Mouth/Throat:      Pharynx: Oropharynx is clear.   Eyes:      Extraocular Movements: Extraocular movements intact.      Pupils: Pupils are equal, round, and reactive to light.   Neck:      Thyroid: No thyroid mass.      Vascular: No carotid bruit.   Cardiovascular:      Rate and Rhythm: Normal rate and regular rhythm.      Pulses: Normal pulses.      Heart  sounds: Normal heart sounds. No murmur heard.  Pulmonary:      Effort: Pulmonary effort is normal.      Breath sounds: Normal breath sounds and air entry. No wheezing.   Abdominal:      General: Bowel sounds are normal. There is no distension.      Palpations: Abdomen is soft.      Tenderness: There is no abdominal tenderness.   Musculoskeletal:         General: Normal range of motion.      Cervical back: Neck supple. No rigidity.   Skin:     General: Skin is warm.      Coloration: Skin is not jaundiced.      Findings: No lesion.   Neurological:      General: No focal deficit present.      Mental Status: He is alert and oriented to person, place, and time.      Cranial Nerves: No cranial nerve deficit.   Psychiatric:         Attention and Perception: Attention normal.         Mood and Affect: Mood normal.         Behavior: Behavior normal. Behavior is cooperative.         Thought Content: Thought content normal.         Cognition and Memory: Cognition normal.       Significant Labs: All pertinent labs within the past 24 hours have been reviewed.    Significant Imaging: I have reviewed all pertinent imaging results/findings within the past 24 hours.

## 2023-04-10 VITALS
HEIGHT: 72 IN | BODY MASS INDEX: 32.67 KG/M2 | SYSTOLIC BLOOD PRESSURE: 135 MMHG | TEMPERATURE: 98 F | OXYGEN SATURATION: 97 % | RESPIRATION RATE: 19 BRPM | HEART RATE: 97 BPM | DIASTOLIC BLOOD PRESSURE: 84 MMHG | WEIGHT: 241.19 LBS

## 2023-04-10 LAB
ANION GAP SERPL CALCULATED.3IONS-SCNC: 15 MMOL/L (ref 7–16)
BASOPHILS # BLD AUTO: 0.11 K/UL (ref 0–0.2)
BASOPHILS NFR BLD AUTO: 0.8 % (ref 0–1)
BUN SERPL-MCNC: 18 MG/DL (ref 7–18)
BUN/CREAT SERPL: 15 (ref 6–20)
CALCIUM SERPL-MCNC: 9.4 MG/DL (ref 8.5–10.1)
CHLORIDE SERPL-SCNC: 101 MMOL/L (ref 98–107)
CO2 SERPL-SCNC: 27 MMOL/L (ref 21–32)
CREAT SERPL-MCNC: 1.22 MG/DL (ref 0.7–1.3)
DIFFERENTIAL METHOD BLD: ABNORMAL
EGFR (NO RACE VARIABLE) (RUSH/TITUS): 75 ML/MIN/1.73M²
EOSINOPHIL # BLD AUTO: 0.12 K/UL (ref 0–0.5)
EOSINOPHIL NFR BLD AUTO: 0.9 % (ref 1–4)
ERYTHROCYTE [DISTWIDTH] IN BLOOD BY AUTOMATED COUNT: 15.9 % (ref 11.5–14.5)
GLUCOSE SERPL-MCNC: 92 MG/DL (ref 74–106)
HCT VFR BLD AUTO: 32.3 % (ref 40–54)
HGB BLD-MCNC: 10.6 G/DL (ref 13.5–18)
IMM GRANULOCYTES # BLD AUTO: 0.13 K/UL (ref 0–0.04)
IMM GRANULOCYTES NFR BLD: 1 % (ref 0–0.4)
LYMPHOCYTES # BLD AUTO: 1.97 K/UL (ref 1–4.8)
LYMPHOCYTES NFR BLD AUTO: 14.4 % (ref 27–41)
MCH RBC QN AUTO: 33.5 PG (ref 27–31)
MCHC RBC AUTO-ENTMCNC: 32.8 G/DL (ref 32–36)
MCV RBC AUTO: 102.2 FL (ref 80–96)
MONOCYTES # BLD AUTO: 0.92 K/UL (ref 0–0.8)
MONOCYTES NFR BLD AUTO: 6.7 % (ref 2–6)
MPC BLD CALC-MCNC: 9.6 FL (ref 9.4–12.4)
NEUTROPHILS # BLD AUTO: 10.4 K/UL (ref 1.8–7.7)
NEUTROPHILS NFR BLD AUTO: 76.2 % (ref 53–65)
NRBC # BLD AUTO: 0 X10E3/UL
NRBC, AUTO (.00): 0 %
PLATELET # BLD AUTO: 819 K/UL (ref 150–400)
POTASSIUM SERPL-SCNC: 3.5 MMOL/L (ref 3.5–5.1)
RBC # BLD AUTO: 3.16 M/UL (ref 4.6–6.2)
SODIUM SERPL-SCNC: 139 MMOL/L (ref 136–145)
WBC # BLD AUTO: 13.65 K/UL (ref 4.5–11)

## 2023-04-10 PROCEDURE — 25000003 PHARM REV CODE 250: Performed by: STUDENT IN AN ORGANIZED HEALTH CARE EDUCATION/TRAINING PROGRAM

## 2023-04-10 PROCEDURE — 25000003 PHARM REV CODE 250: Performed by: FAMILY MEDICINE

## 2023-04-10 PROCEDURE — 63600175 PHARM REV CODE 636 W HCPCS: Performed by: FAMILY MEDICINE

## 2023-04-10 PROCEDURE — 94761 N-INVAS EAR/PLS OXIMETRY MLT: CPT

## 2023-04-10 PROCEDURE — 99238 PR HOSPITAL DISCHARGE DAY,<30 MIN: ICD-10-PCS | Mod: ,,, | Performed by: STUDENT IN AN ORGANIZED HEALTH CARE EDUCATION/TRAINING PROGRAM

## 2023-04-10 PROCEDURE — 80048 BASIC METABOLIC PNL TOTAL CA: CPT | Performed by: INTERNAL MEDICINE

## 2023-04-10 PROCEDURE — 25000003 PHARM REV CODE 250: Performed by: INTERNAL MEDICINE

## 2023-04-10 PROCEDURE — 85025 COMPLETE CBC W/AUTO DIFF WBC: CPT | Performed by: INTERNAL MEDICINE

## 2023-04-10 PROCEDURE — 25000003 PHARM REV CODE 250: Performed by: HOSPITALIST

## 2023-04-10 PROCEDURE — 99238 HOSP IP/OBS DSCHRG MGMT 30/<: CPT | Mod: ,,, | Performed by: STUDENT IN AN ORGANIZED HEALTH CARE EDUCATION/TRAINING PROGRAM

## 2023-04-10 PROCEDURE — 25000003 PHARM REV CODE 250: Performed by: NURSE PRACTITIONER

## 2023-04-10 RX ORDER — ALPRAZOLAM 0.5 MG/1
0.5 TABLET ORAL 3 TIMES DAILY PRN
Qty: 9 TABLET | Refills: 0 | Status: SHIPPED | OUTPATIENT
Start: 2023-04-10 | End: 2023-05-10

## 2023-04-10 RX ADMIN — PANTOPRAZOLE SODIUM 40 MG: 40 TABLET, DELAYED RELEASE ORAL at 08:04

## 2023-04-10 RX ADMIN — HYDRALAZINE HYDROCHLORIDE 25 MG: 25 TABLET ORAL at 01:04

## 2023-04-10 RX ADMIN — ESCITALOPRAM OXALATE 10 MG: 10 TABLET ORAL at 08:04

## 2023-04-10 RX ADMIN — CLORAZEPATE DIPOTASSIUM 3.75 MG: 3.75 TABLET ORAL at 08:04

## 2023-04-10 RX ADMIN — Medication 4 CAPSULE: at 11:04

## 2023-04-10 RX ADMIN — AMLODIPINE BESYLATE 10 MG: 10 TABLET ORAL at 08:04

## 2023-04-10 RX ADMIN — DIVALPROEX SODIUM 250 MG: 250 TABLET, DELAYED RELEASE ORAL at 01:04

## 2023-04-10 RX ADMIN — ONDANSETRON HYDROCHLORIDE 4 MG: 2 SOLUTION INTRAMUSCULAR; INTRAVENOUS at 01:04

## 2023-04-10 RX ADMIN — LOPERAMIDE HYDROCHLORIDE 2 MG: 2 CAPSULE ORAL at 01:04

## 2023-04-10 RX ADMIN — IBUPROFEN 800 MG: 400 TABLET ORAL at 04:04

## 2023-04-10 RX ADMIN — HYDRALAZINE HYDROCHLORIDE 25 MG: 25 TABLET ORAL at 06:04

## 2023-04-10 RX ADMIN — DIVALPROEX SODIUM 250 MG: 250 TABLET, DELAYED RELEASE ORAL at 05:04

## 2023-04-10 RX ADMIN — ASPIRIN 81 MG: 81 TABLET, DELAYED RELEASE ORAL at 08:04

## 2023-04-10 RX ADMIN — Medication 4 CAPSULE: at 08:04

## 2023-04-10 RX ADMIN — ALPRAZOLAM 0.5 MG: 0.5 TABLET ORAL at 04:04

## 2023-04-10 RX ADMIN — ONDANSETRON HYDROCHLORIDE 4 MG: 2 SOLUTION INTRAMUSCULAR; INTRAVENOUS at 03:04

## 2023-04-10 RX ADMIN — ALPRAZOLAM 0.5 MG: 0.5 TABLET ORAL at 01:04

## 2023-04-10 NOTE — PLAN OF CARE
[facility]  Discharge Final Note    Primary Care Provider: Raymundo Cabrera MD    Expected Discharge Date: 4/10/2023    Final Discharge Note (most recent)       Final Note - 04/10/23 1604          Final Note    Assessment Type Final Discharge Note     Anticipated Discharge Disposition Home or Self Care        Post-Acute Status    Discharge Delays None known at this time                     Important Message from Medicare  Important Message from Medicare regarding Discharge Appeal Rights: Given to patient/caregiver, Explained to patient/caregiver, Signed/date by patient/caregiver     Date IMM was signed: 03/22/23  Time IMM was signed: 1333    Contact Info       MICHAEL Jacobo   Specialty: Cardiology    [address] [place] [address] Professional Building  [facility] [address]   Phone: [phone]       Next Steps: Follow up in 2 week(s)    Instructions: Schedule cardiology follow up 2 weeks after discharge for outpatient ischemic evaluation.    Raymundo Cabrera MD   Specialty: Family Medicine   Relationship: PCP - General    [address]  [facility] [address]   Phone: [phone]       Next Steps: Schedule an appointment as soon as possible for a visit in 1 month(s)          Rec'd call back from pts sister and family will p/u pt from train station in [facility], SS assisted pt to order an [employer] train ticket from [facility] to [facility] where his family will pick him up from, $10 given to pts nurse lorrie rn to call gonzalo rothman when pt ready for dc to train station

## 2023-04-10 NOTE — NURSING
"Notify Dr. Jernigan at this time via secure chat of pt c/o abdominal cramping, pain to Right side of abdomen radiating to Right side of back with "8" on pain scale 0-10, and pt reporting can't sleep, twists and turns, and feeling anxious, and Xanax oral dose prn not working anymore. Will await reply.  "

## 2023-04-10 NOTE — DISCHARGE SUMMARY
[facility] Short Stay Unit  Hospital Medicine  Discharge Summary      Patient Name: Carol Ladd Jr.  MRN: [id number]  ROBB: [id number]  Patient Class: IP- Inpatient  Admission Date: 3/21/2023  Hospital Length of Stay: 20 days  Discharge Date and Time:  04/10/2023 9:49 AM  Attending Physician: Tavo Woodard DO   Discharging Provider: Tavo Woodard DO  Primary Care Provider: Raymundo Cabrera MD    Primary Care Team: Networked reference to record PCT     HPI:     Principal Problem: Alcohol withdrawal syndrome with complication     Subjective:      HPI:  48 y/o male who was transferred to [facility] for increased agitation from [place]. He was admitted there 48 hours ago for ETOH detox. He drinks over 1/5 of alcohol daily and has for several years to help with his tourette's.  Family states that he helps better than medication.  He has also been on Subaxone for several years as well.  His last drink for 03/19. ETOH level was over 400 at [facility].  On arrival to the ER his HR was 160 and he was diaphoretic.  Lactic acid was 10 on ABG and CK 3,333 and Cr 4.38. He was treated with 3 liters NS. He then became hypotensive and was started on Levophed.   On exam he is lethargic due to receiving 6 mg IV ativan but he states that his muscle spasm/contractions are similar to his tics but much worse.  He was started on several psych medications at [facility] but was not taking any at home.      PMH - HTN, ODD, tourette's, opoid abuse and ETOH abuse.                Past Medical History:   Diagnosis Date    Hypertension      Mixed obsessional thoughts and acts 01/24/2017    Tourette syndrome      Tourette's syndrome 01/24/2017         History reviewed. No pertinent surgical history.          Review of patient's allergies indicates:   Allergen Reactions    Haldol [haloperidol lactate]           Family History         Problem Relation (Age of Onset)     OCD Mother, Sister, Daughter     Tics Father, Paternal  Uncle, Cousin                   Tobacco Use    Smoking status: Never    Smokeless tobacco: Not on file   Substance and Sexual Activity    Alcohol use: Yes       Comment: reports drinking a 5th of alcohol/day    Drug use: Not Currently    Sexual activity: Not Currently           * No surgery found *      Hospital Course:   04/04 - Records reviewed. Admitted from [facility] 03/21 after noted hypotension and altered mental status. There secondary EtoH abuse. Had abnoranl CXR. Was intubated for several days then HFNC. Since mental status back to baseline and on 2L BNC.   Past Medical History:   Diagnosis Date    Hypertension      Mixed obsessional thoughts and acts 01/24/2017    Tourette syndrome      Tourette's syndrome    -Also THOMAS. Wears CPAP but didn't bring with him to [facility]. Nurse staff tell me he is a big snorer and has apnea on floor droping sats while asleep. CXR better. Increase activity; told off balance.   Look into Dc needs.  4/5- still intermittently tachycardic.  Respiratory status improved. Doing well  4/6-denied by [facility], medically stable for discharge.  Can go once ride arranged    4/7-medically stable, after discussion with family decided to go to [facility] rehab. Can DC once ride arrives    4/8- patient seen examined today sitting comfortably on the edge of the bed with no complaints.  During the patient's admission he is undergone echocardiogram which showed The left ventricle is normal in size with hyperdynamic systolic function.   The estimated ejection fraction is 75%.   Normal left ventricular diastolic function.   Mild right ventricular enlargement.   Mild right atrial enlargement.   Mild tricuspid regurgitation.   Elevated central venous pressure (15 mmHg).   The estimated PA systolic pressure is 46 mmHg.   There is pulmonary hypertension.   Sinus rhythm, 120.   Trivial pericardial effusion.    - EKG obtained on this visit shows sinus tachycardia without any  other acute EKG changes.  - troponin was initially elevated on 03/25/2023 upon patient's admission during his acute withdrawal episode.    - has since proven to be stable from a cardiac standpoint, will a advise clinical cardiac follow-up secondary to extensive substance abuse.  4/9- patient seen examined resting comfortably in bed today, in no acute distress with no acute events overnight.  Patient remains medically ready for discharge.  We are having trouble with displacement.  Rehab facility in [place] has denied patient's admission secondary to insurance.  Case management now checking with [facility] locally.    Mr. Ladd is not a danger to himself or others, no SI/HI or other thoughts of self harm.  He can safely be discharged home.  I believe he would benefit greatly from rehab and this can be pursued from home.       Goals of Care Treatment Preferences:         Consults:   Consults (From admission, onward)        Status Ordering Provider     Inpatient consult to Social Work  Once        Provider:  (Not yet assigned)    Completed LISSA TREVIZO     Inpatient consult to Registered Dietitian/Nutritionist  Once        Provider:  (Not yet assigned)    Completed LISSA TREVIZO     Inpatient consult to Social Work  Once        Provider:  (Not yet assigned)    Completed JORGE ALBERTO RAYMUNDO     Inpatient consult to Telemedicine - Psychiatry  Once        Provider:  (Not yet assigned)    Completed JORGE ALBERTO RAYMUNDO     Inpatient consult to Cardiology  Once        Provider:  (Not yet assigned)    Completed JORDI BATISTA     Inpatient consult to Registered Dietitian/Nutritionist  Once        Provider:  (Not yet assigned)    Completed JORDI BATISTA          Neuro  Tourette syndrome  stable      Psychiatric  Depression  Patient has persistent depression which is moderate and is currently uncontrolled. Will Continue anti-depressant medications. We will not consult psychiatry at this time. Patient does not display  psychosis at this time. Continue to monitor closely and adjust plan of care as needed.    No SI/HI  Safe for DC home        Cardiac/Vascular  HTN (hypertension)    pcp follow up      Other  Obstructive sleep apnea    Continue cpap      Final Active Diagnoses:    Diagnosis Date Noted POA    Obstructive sleep apnea [G47.33] 03/22/2023 Yes    HTN (hypertension) [I10] 03/21/2023 Yes    Depression [F32.A] 03/21/2023 Yes    Tourette syndrome [F95.2] 01/24/2017 Yes      Problems Resolved During this Admission:    Diagnosis Date Noted Date Resolved POA    PRINCIPAL PROBLEM:  Alcohol withdrawal syndrome with complication [F10.939] 03/21/2023 04/03/2023 Yes    Acute respiratory failure with hypoxia and hypercarbia [J96.01, J96.02] 03/27/2023 04/03/2023 No    Encephalopathy, toxic [G92.9] 03/23/2023 04/03/2023 Yes    Elevated troponin level not due myocardial infarction [R77.8] 03/23/2023 04/03/2023 Yes    Acute kidney injury [N17.9] 03/21/2023 04/07/2023 Yes    Rhabdomyolysis [M62.82] 03/21/2023 04/03/2023 No    Metabolic acidosis [E87.20] 03/21/2023 04/03/2023 Yes    AMS (altered mental status) [R41.82] 03/21/2023 04/03/2023 Yes       Discharged Condition: good    Disposition: Rehab Facility    Follow Up:   Follow-up Information     MICHAEL Jacobo Follow up in 2 week(s).    Specialty: Cardiology  Why: Schedule cardiology follow up 2 weeks after discharge for outpatient ischemic evaluation.  Contact information:  [address] [place] [address] [facility]  [place] [address]  [phone]             Raymundo Cabrera MD. Schedule an appointment as soon as possible for a visit in 1 month(s).    Specialty: Family Medicine  Contact information:  [address] [address] [phone]                       Patient Instructions:      Diet Adult Regular     Activity as tolerated       Significant Diagnostic Studies: Labs: All labs within the past 24 hours have been reviewed    Pending  Diagnostic Studies:     Procedure Component Value Units Date/Time    EXTRA TUBES [[id number]] Collected: 03/25/23 0804    Order Status: Sent Lab Status: In process Updated: 03/25/23 0804    Specimen: Blood, Venous     Narrative:      The following orders were created for panel order EXTRA TUBES.  Procedure                               Abnormality         Status                     ---------                               -----------         ------                     Red Top Hold[[id number]]                                     In process                   Please view results for these tests on the individual orders.    EXTRA TUBES [[id number]]     Order Status: Sent Lab Status: No result     Specimen: Blood, Venous     EXTRA TUBES [[id number]] Collected: 03/21/23 2147    Order Status: Sent Lab Status: In process Updated: 03/21/23 2147    Specimen: Blood, Venous     Narrative:      The following orders were created for panel order EXTRA TUBES.  Procedure                               Abnormality         Status                     ---------                               -----------         ------                     Lavender Top Hold[[id number]]                                In process                   Please view results for these tests on the individual orders.         Medications:  Reconciled Home Medications:      Medication List      START taking these medications    ALPRAZolam 0.5 MG tablet  Commonly known as: XANAX  Take 1 tablet (0.5 mg total) by mouth 3 (three) times daily as needed for Anxiety.        CHANGE how you take these medications    clorazepate 15 MG tablet  Commonly known as: TRANXENE  Take 1 tablet (15 mg total) by mouth 3 (three) times daily.  What changed: when to take this        CONTINUE taking these medications    buprenorphine-naloxone 2-0.5 mg 2-0.5 mg Subl  Commonly known as: SUBOXONE  Place 8 mg under the tongue every 6 (six) hours as needed.     chlordiazepoxide 10 MG capsule  Commonly  known as: LIBRIUM  Take 2 capsules (20 mg total) by mouth 3 (three) times daily as needed (tics).     clomiPHENE 50 mg tablet  Commonly known as: CLOMID  Take 50 mg by mouth once daily.     clonazePAM 1 MG tablet  Commonly known as: KlonoPIN  Take 1 mg by mouth 2 (two) times daily as needed for Anxiety.     dextroamphetamine-amphetamine 30 mg Tab  Take by mouth.     doxepin 25 MG capsule  Commonly known as: SINEQUAN  Take 25 mg by mouth every evening.     FLUoxetine 20 MG capsule  Take 20 mg by mouth once daily.     guanFACINE 2 MG tablet  Commonly known as: TENEX  Take 2 mg by mouth every evening.     levETIRAcetam 500 MG Tab  Commonly known as: KEPPRA  Take 500 mg by mouth 2 (two) times daily.     omeprazole 40 MG capsule  Commonly known as: PRILOSEC  Take 40 mg by mouth once daily.     ONE DAILY MULTIVITAMIN per tablet  Generic drug: multivitamin  Take 1 tablet by mouth once daily.     pimozide 1 mg Tab  Commonly known as: ORAP  Take 1 mg by mouth 2 (two) times a day.     testosterone cypionate 100 mg/mL injection  Commonly known as: DEPOTESTOTERONE CYPIONATE  Inject 200 mg into the muscle every 14 (fourteen) days.     thiamine 100 MG tablet  Take 100 mg by mouth once daily.        STOP taking these medications    hydroCHLOROthiazide 25 MG tablet  Commonly known as: HYDRODIURIL     lisinopriL 20 MG tablet  Commonly known as: PRINIVIL,ZESTRIL     losartan 100 MG tablet  Commonly known as: COZAAR            Indwelling Lines/Drains at time of discharge:   Lines/Drains/Airways     None                 Time spent on the discharge of patient: <30 minutes         Tavo Woodard DO  Department of Hospital Medicine  [facility] Short Stay [facility]

## 2023-04-10 NOTE — PLAN OF CARE
Spoke with [facility] admissions and pt denied for medical reasons, left vm for Nicholas Boubacar at [facility] [phone] per pt's sister's request, updated Dr. Woodard and pt will be for dc today either to home or rehab if accepted, updated pt's sister Rosemary, following

## 2023-04-10 NOTE — NURSING
"Notify Dr. Jenrigan at this time via secure chat of pt c/o Right Side Abdominal Cramping and pt having diarrhea, been taking lomotil with pt reporting no effectiveness with Lomotil. Also, pt  appearing anxious and agitated and states, "I've have anxiety and that Xanax ain't working." Orders rec'd for Morphine IM and Benadryl IV to be given once-See orders for further dosage info.  "

## 2023-04-10 NOTE — NURSING
No new orders rec'd feom Dr. Jernigan at this time. Pt does agree to try Xanax and Ibuprofen po prn meds at this time and this nurse will administer.

## 2023-04-10 NOTE — PLAN OF CARE
Spoke with Christine at [facility] and they can review referral, Referral faxed to [facility] [phone] per sister request, following  Ss also called [facility] and they have a 3 week waitlist, ss called [facility] and they have a waitlist, ss called [facility] and the director is off today so cannot do any referrals

## 2023-04-10 NOTE — PLAN OF CARE
Rec'd call from Andres Gallardo of [place] [facility] and he has had spoken to pts sister Rosemary about drug/etoh rehab for pt, they is funding available for pt to go to rehab but [facility] director is off today therefore it would be tomorrow before pt can be reviewed for admit to [facility], ss notified Dr. Woodard who says pt is ready for dc today, spoke with pt and his sister and he does not have a ride back home, ss called [facility] about transportation back home to [place] but they report that pt has been dc'd from their facility too long for them to provide transport, ss called medicaid but they were unable to tell ss if pt has benefit, no [employer] bus route to pt's area, [email or web address] runs at 419pm to [place], [place] which is closest stop to pt's home, ss spoke with pt and his sister to see if someone can get him from the train station this evening at 719pm, pt's sister to call ss back, ss will notify Andres Gallardo of pt's disposition

## 2023-04-11 ENCOUNTER — PATIENT OUTREACH (OUTPATIENT)
Dept: ADMINISTRATIVE | Facility: CLINIC | Age: 48
End: 2023-04-11
Payer: MEDICARE

## 2023-04-11 NOTE — PROGRESS NOTES
C3 nurse attempted to contact patient for a [facility] post hospital discharge follow-up call. The patient declined call at this time.  Patient states he has been discharged and  will be sending the hospital a note.

## 2023-06-05 NOTE — ASSESSMENT & PLAN NOTE
Little more prominent this morning  Controlled at present    PSA would be ordered and managed  through his urologist or oncologist who is treating his prostate cancer.  I will forward this message to them